# Patient Record
Sex: MALE | Race: WHITE | Employment: OTHER | URBAN - METROPOLITAN AREA
[De-identification: names, ages, dates, MRNs, and addresses within clinical notes are randomized per-mention and may not be internally consistent; named-entity substitution may affect disease eponyms.]

---

## 2021-09-24 ENCOUNTER — HOSPITAL ENCOUNTER (INPATIENT)
Age: 61
LOS: 7 days | Discharge: NURSING, REHAB OR LONG-TERM CARE | DRG: 871 | End: 2021-10-02
Attending: INTERNAL MEDICINE | Admitting: INTERNAL MEDICINE
Payer: MEDICARE

## 2021-09-24 ENCOUNTER — APPOINTMENT (OUTPATIENT)
Dept: GENERAL RADIOLOGY | Age: 61
DRG: 871 | End: 2021-09-24
Payer: MEDICARE

## 2021-09-24 DIAGNOSIS — J18.9 COMMUNITY ACQUIRED PNEUMONIA OF LEFT LUNG, UNSPECIFIED PART OF LUNG: ICD-10-CM

## 2021-09-24 DIAGNOSIS — J96.01 ACUTE RESPIRATORY FAILURE WITH HYPOXEMIA (HCC): Primary | ICD-10-CM

## 2021-09-24 DIAGNOSIS — N17.9 AKI (ACUTE KIDNEY INJURY) (HCC): ICD-10-CM

## 2021-09-24 LAB
A/G RATIO: 0.9 (ref 1.1–2.2)
ALBUMIN SERPL-MCNC: 3.8 G/DL (ref 3.4–5)
ALP BLD-CCNC: 67 U/L (ref 40–129)
ALT SERPL-CCNC: 19 U/L (ref 10–40)
ANION GAP SERPL CALCULATED.3IONS-SCNC: 19 MMOL/L (ref 3–16)
AST SERPL-CCNC: 30 U/L (ref 15–37)
BASOPHILS ABSOLUTE: 0 K/UL (ref 0–0.2)
BASOPHILS RELATIVE PERCENT: 0.3 %
BILIRUB SERPL-MCNC: 0.5 MG/DL (ref 0–1)
BUN BLDV-MCNC: 90 MG/DL (ref 7–20)
CALCIUM SERPL-MCNC: 9 MG/DL (ref 8.3–10.6)
CHLORIDE BLD-SCNC: 91 MMOL/L (ref 99–110)
CO2: 17 MMOL/L (ref 21–32)
CREAT SERPL-MCNC: 2.1 MG/DL (ref 0.8–1.3)
D DIMER: 1100 NG/ML DDU (ref 0–229)
EOSINOPHILS ABSOLUTE: 0 K/UL (ref 0–0.6)
EOSINOPHILS RELATIVE PERCENT: 0.1 %
GFR AFRICAN AMERICAN: 39
GFR NON-AFRICAN AMERICAN: 32
GLOBULIN: 4.1 G/DL
GLUCOSE BLD-MCNC: 137 MG/DL (ref 70–99)
HCT VFR BLD CALC: 47.4 % (ref 40.5–52.5)
HEMOGLOBIN: 16.4 G/DL (ref 13.5–17.5)
LACTIC ACID, SEPSIS: 1.2 MMOL/L (ref 0.4–1.9)
LYMPHOCYTES ABSOLUTE: 0.7 K/UL (ref 1–5.1)
LYMPHOCYTES RELATIVE PERCENT: 4.8 %
MCH RBC QN AUTO: 27.1 PG (ref 26–34)
MCHC RBC AUTO-ENTMCNC: 34.5 G/DL (ref 31–36)
MCV RBC AUTO: 78.3 FL (ref 80–100)
MONOCYTES ABSOLUTE: 0.6 K/UL (ref 0–1.3)
MONOCYTES RELATIVE PERCENT: 4.5 %
NEUTROPHILS ABSOLUTE: 12.3 K/UL (ref 1.7–7.7)
NEUTROPHILS RELATIVE PERCENT: 90.3 %
PDW BLD-RTO: 14.5 % (ref 12.4–15.4)
PLATELET # BLD: 194 K/UL (ref 135–450)
PMV BLD AUTO: 8.7 FL (ref 5–10.5)
POTASSIUM SERPL-SCNC: 3.4 MMOL/L (ref 3.5–5.1)
PRO-BNP: 283 PG/ML (ref 0–124)
PROCALCITONIN: 0.31 NG/ML (ref 0–0.15)
RBC # BLD: 6.05 M/UL (ref 4.2–5.9)
SODIUM BLD-SCNC: 127 MMOL/L (ref 136–145)
TOTAL PROTEIN: 7.9 G/DL (ref 6.4–8.2)
TROPONIN: <0.01 NG/ML
WBC # BLD: 13.6 K/UL (ref 4–11)

## 2021-09-24 PROCEDURE — 96374 THER/PROPH/DIAG INJ IV PUSH: CPT

## 2021-09-24 PROCEDURE — 83880 ASSAY OF NATRIURETIC PEPTIDE: CPT

## 2021-09-24 PROCEDURE — 84443 ASSAY THYROID STIM HORMONE: CPT

## 2021-09-24 PROCEDURE — 84300 ASSAY OF URINE SODIUM: CPT

## 2021-09-24 PROCEDURE — 84145 PROCALCITONIN (PCT): CPT

## 2021-09-24 PROCEDURE — 87086 URINE CULTURE/COLONY COUNT: CPT

## 2021-09-24 PROCEDURE — U0005 INFEC AGEN DETEC AMPLI PROBE: HCPCS

## 2021-09-24 PROCEDURE — 71045 X-RAY EXAM CHEST 1 VIEW: CPT

## 2021-09-24 PROCEDURE — 81001 URINALYSIS AUTO W/SCOPE: CPT

## 2021-09-24 PROCEDURE — 85379 FIBRIN DEGRADATION QUANT: CPT

## 2021-09-24 PROCEDURE — 36415 COLL VENOUS BLD VENIPUNCTURE: CPT

## 2021-09-24 PROCEDURE — 80053 COMPREHEN METABOLIC PANEL: CPT

## 2021-09-24 PROCEDURE — 87449 NOS EACH ORGANISM AG IA: CPT

## 2021-09-24 PROCEDURE — 6360000002 HC RX W HCPCS: Performed by: NURSE PRACTITIONER

## 2021-09-24 PROCEDURE — 83605 ASSAY OF LACTIC ACID: CPT

## 2021-09-24 PROCEDURE — 85025 COMPLETE CBC W/AUTO DIFF WBC: CPT

## 2021-09-24 PROCEDURE — U0003 INFECTIOUS AGENT DETECTION BY NUCLEIC ACID (DNA OR RNA); SEVERE ACUTE RESPIRATORY SYNDROME CORONAVIRUS 2 (SARS-COV-2) (CORONAVIRUS DISEASE [COVID-19]), AMPLIFIED PROBE TECHNIQUE, MAKING USE OF HIGH THROUGHPUT TECHNOLOGIES AS DESCRIBED BY CMS-2020-01-R: HCPCS

## 2021-09-24 PROCEDURE — 84484 ASSAY OF TROPONIN QUANT: CPT

## 2021-09-24 PROCEDURE — 99284 EMERGENCY DEPT VISIT MOD MDM: CPT

## 2021-09-24 PROCEDURE — 83935 ASSAY OF URINE OSMOLALITY: CPT

## 2021-09-24 PROCEDURE — 82550 ASSAY OF CK (CPK): CPT

## 2021-09-24 PROCEDURE — 93005 ELECTROCARDIOGRAM TRACING: CPT | Performed by: NURSE PRACTITIONER

## 2021-09-24 PROCEDURE — 87040 BLOOD CULTURE FOR BACTERIA: CPT

## 2021-09-24 RX ORDER — OXYCODONE AND ACETAMINOPHEN 10; 325 MG/1; MG/1
1 TABLET ORAL EVERY 8 HOURS PRN
COMMUNITY

## 2021-09-24 RX ORDER — HEPARIN SODIUM 10000 [USP'U]/100ML
5-30 INJECTION, SOLUTION INTRAVENOUS CONTINUOUS
Status: DISCONTINUED | OUTPATIENT
Start: 2021-09-24 | End: 2021-09-29

## 2021-09-24 RX ORDER — HEPARIN SODIUM 1000 [USP'U]/ML
40 INJECTION, SOLUTION INTRAVENOUS; SUBCUTANEOUS PRN
Status: DISCONTINUED | OUTPATIENT
Start: 2021-09-24 | End: 2021-09-29

## 2021-09-24 RX ORDER — POTASSIUM BICARBONATE 25 MEQ/1
50 TABLET, EFFERVESCENT ORAL ONCE
Status: COMPLETED | OUTPATIENT
Start: 2021-09-24 | End: 2021-09-25

## 2021-09-24 RX ORDER — HEPARIN SODIUM 1000 [USP'U]/ML
80 INJECTION, SOLUTION INTRAVENOUS; SUBCUTANEOUS ONCE
Status: COMPLETED | OUTPATIENT
Start: 2021-09-24 | End: 2021-09-25

## 2021-09-24 RX ORDER — DEXAMETHASONE SODIUM PHOSPHATE 10 MG/ML
6 INJECTION, SOLUTION INTRAMUSCULAR; INTRAVENOUS ONCE
Status: COMPLETED | OUTPATIENT
Start: 2021-09-24 | End: 2021-09-24

## 2021-09-24 RX ORDER — HEPARIN SODIUM 1000 [USP'U]/ML
80 INJECTION, SOLUTION INTRAVENOUS; SUBCUTANEOUS PRN
Status: DISCONTINUED | OUTPATIENT
Start: 2021-09-24 | End: 2021-09-29

## 2021-09-24 RX ADMIN — DEXAMETHASONE SODIUM PHOSPHATE 6 MG: 10 INJECTION, SOLUTION INTRAMUSCULAR; INTRAVENOUS at 23:46

## 2021-09-24 ASSESSMENT — ENCOUNTER SYMPTOMS
CHEST TIGHTNESS: 1
VOMITING: 0
DIARRHEA: 1
NAUSEA: 1
COUGH: 1
SHORTNESS OF BREATH: 1
ABDOMINAL PAIN: 0

## 2021-09-24 ASSESSMENT — PAIN DESCRIPTION - LOCATION: LOCATION: BACK

## 2021-09-24 ASSESSMENT — PAIN SCALES - GENERAL: PAINLEVEL_OUTOF10: 8

## 2021-09-25 PROBLEM — E87.1 HYPONATREMIA: Status: ACTIVE | Noted: 2021-09-25

## 2021-09-25 PROBLEM — F41.9 ANXIETY: Status: ACTIVE | Noted: 2021-09-25

## 2021-09-25 PROBLEM — U07.1 PNEUMONIA DUE TO COVID-19 VIRUS: Status: ACTIVE | Noted: 2021-09-25

## 2021-09-25 PROBLEM — J96.01 ACUTE RESPIRATORY FAILURE WITH HYPOXIA (HCC): Status: ACTIVE | Noted: 2021-09-25

## 2021-09-25 PROBLEM — J12.82 PNEUMONIA DUE TO COVID-19 VIRUS: Status: ACTIVE | Noted: 2021-09-25

## 2021-09-25 PROBLEM — N17.9 AKI (ACUTE KIDNEY INJURY) (HCC): Status: ACTIVE | Noted: 2021-09-25

## 2021-09-25 PROBLEM — E66.9 OBESITY (BMI 30-39.9): Status: ACTIVE | Noted: 2021-09-25

## 2021-09-25 LAB
A/G RATIO: 0.9 (ref 1.1–2.2)
ALBUMIN SERPL-MCNC: 3.6 G/DL (ref 3.4–5)
ALP BLD-CCNC: 73 U/L (ref 40–129)
ALT SERPL-CCNC: 24 U/L (ref 10–40)
ANION GAP SERPL CALCULATED.3IONS-SCNC: 17 MMOL/L (ref 3–16)
APTT: 140.5 SEC (ref 26.2–38.6)
APTT: 25 SEC (ref 26.2–38.6)
APTT: >248 SEC (ref 26.2–38.6)
AST SERPL-CCNC: 45 U/L (ref 15–37)
BASOPHILS ABSOLUTE: 0.1 K/UL (ref 0–0.2)
BASOPHILS RELATIVE PERCENT: 0.5 %
BILIRUB SERPL-MCNC: 0.4 MG/DL (ref 0–1)
BILIRUBIN URINE: NEGATIVE
BLOOD, URINE: ABNORMAL
BUN BLDV-MCNC: 91 MG/DL (ref 7–20)
C DIFF TOXIN/ANTIGEN: NORMAL
C-REACTIVE PROTEIN: 267.2 MG/L (ref 0–5.1)
CALCIUM SERPL-MCNC: 8.6 MG/DL (ref 8.3–10.6)
CHLORIDE BLD-SCNC: 92 MMOL/L (ref 99–110)
CLARITY: ABNORMAL
CO2: 17 MMOL/L (ref 21–32)
COLOR: YELLOW
COMMENT UA: ABNORMAL
CREAT SERPL-MCNC: 2.2 MG/DL (ref 0.8–1.3)
D DIMER: 525 NG/ML DDU (ref 0–229)
EKG ATRIAL RATE: 101 BPM
EKG DIAGNOSIS: NORMAL
EKG P AXIS: -24 DEGREES
EKG P-R INTERVAL: 120 MS
EKG Q-T INTERVAL: 330 MS
EKG QRS DURATION: 102 MS
EKG QTC CALCULATION (BAZETT): 427 MS
EKG R AXIS: 147 DEGREES
EKG T AXIS: -27 DEGREES
EKG VENTRICULAR RATE: 101 BPM
EOSINOPHILS ABSOLUTE: 0 K/UL (ref 0–0.6)
EOSINOPHILS RELATIVE PERCENT: 0 %
EPITHELIAL CELLS, UA: 3 /HPF (ref 0–5)
FIBRINOGEN: 911 MG/DL (ref 200–397)
FINE CASTS, UA: ABNORMAL /LPF (ref 0–2)
GFR AFRICAN AMERICAN: 37
GFR NON-AFRICAN AMERICAN: 30
GLOBULIN: 4 G/DL
GLUCOSE BLD-MCNC: 202 MG/DL (ref 70–99)
GLUCOSE BLD-MCNC: 232 MG/DL (ref 70–99)
GLUCOSE BLD-MCNC: 287 MG/DL (ref 70–99)
GLUCOSE URINE: NEGATIVE MG/DL
HCT VFR BLD CALC: 44.1 % (ref 40.5–52.5)
HEMOGLOBIN: 15.3 G/DL (ref 13.5–17.5)
HYALINE CASTS: ABNORMAL /LPF (ref 0–2)
INR BLD: 1.35 (ref 0.88–1.12)
KETONES, URINE: NEGATIVE MG/DL
LACTATE DEHYDROGENASE: 457 U/L (ref 100–190)
LACTIC ACID: 1.1 MMOL/L (ref 0.4–2)
LEUKOCYTE ESTERASE, URINE: NEGATIVE
LYMPHOCYTES ABSOLUTE: 0.4 K/UL (ref 1–5.1)
LYMPHOCYTES RELATIVE PERCENT: 3.3 %
MCH RBC QN AUTO: 27.1 PG (ref 26–34)
MCHC RBC AUTO-ENTMCNC: 34.8 G/DL (ref 31–36)
MCV RBC AUTO: 78 FL (ref 80–100)
MICROSCOPIC EXAMINATION: YES
MONOCYTES ABSOLUTE: 0.4 K/UL (ref 0–1.3)
MONOCYTES RELATIVE PERCENT: 2.9 %
NEUTROPHILS ABSOLUTE: 12.5 K/UL (ref 1.7–7.7)
NEUTROPHILS RELATIVE PERCENT: 93.3 %
NITRITE, URINE: NEGATIVE
OSMOLALITY URINE: 629 MOSM/KG (ref 390–1070)
OSMOLALITY: 307 MOSM/KG (ref 280–301)
PDW BLD-RTO: 14.5 % (ref 12.4–15.4)
PERFORMED ON: ABNORMAL
PERFORMED ON: ABNORMAL
PH UA: 5.5 (ref 5–8)
PLATELET # BLD: 172 K/UL (ref 135–450)
PMV BLD AUTO: 8 FL (ref 5–10.5)
POTASSIUM REFLEX MAGNESIUM: 3.9 MMOL/L (ref 3.5–5.1)
PROCALCITONIN: 0.34 NG/ML (ref 0–0.15)
PROTEIN UA: 100 MG/DL
PROTHROMBIN TIME: 15.5 SEC (ref 9.9–12.7)
RBC # BLD: 5.66 M/UL (ref 4.2–5.9)
RBC UA: 5 /HPF (ref 0–4)
REASON FOR REJECTION: NORMAL
REJECTED TEST: NORMAL
SARS-COV-2: DETECTED
SODIUM BLD-SCNC: 126 MMOL/L (ref 136–145)
SODIUM BLD-SCNC: 126 MMOL/L (ref 136–145)
SODIUM BLD-SCNC: 128 MMOL/L (ref 136–145)
SODIUM URINE: <20 MMOL/L
SPECIFIC GRAVITY UA: 1.02 (ref 1–1.03)
TOTAL CK: 267 U/L (ref 39–308)
TOTAL PROTEIN: 7.6 G/DL (ref 6.4–8.2)
TROPONIN: <0.01 NG/ML
TSH REFLEX: 0.81 UIU/ML (ref 0.27–4.2)
URINE REFLEX TO CULTURE: YES
URINE TYPE: ABNORMAL
UROBILINOGEN, URINE: 0.2 E.U./DL
VITAMIN D 25-HYDROXY: 82.2 NG/ML
WBC # BLD: 13.4 K/UL (ref 4–11)
WBC UA: 11 /HPF (ref 0–5)

## 2021-09-25 PROCEDURE — 84145 PROCALCITONIN (PCT): CPT

## 2021-09-25 PROCEDURE — 84295 ASSAY OF SERUM SODIUM: CPT

## 2021-09-25 PROCEDURE — 83615 LACTATE (LD) (LDH) ENZYME: CPT

## 2021-09-25 PROCEDURE — 2060000000 HC ICU INTERMEDIATE R&B

## 2021-09-25 PROCEDURE — 87449 NOS EACH ORGANISM AG IA: CPT

## 2021-09-25 PROCEDURE — 83930 ASSAY OF BLOOD OSMOLALITY: CPT

## 2021-09-25 PROCEDURE — 84484 ASSAY OF TROPONIN QUANT: CPT

## 2021-09-25 PROCEDURE — 6370000000 HC RX 637 (ALT 250 FOR IP): Performed by: INTERNAL MEDICINE

## 2021-09-25 PROCEDURE — 2580000003 HC RX 258: Performed by: INTERNAL MEDICINE

## 2021-09-25 PROCEDURE — 85379 FIBRIN DEGRADATION QUANT: CPT

## 2021-09-25 PROCEDURE — 6360000002 HC RX W HCPCS: Performed by: INTERNAL MEDICINE

## 2021-09-25 PROCEDURE — 6370000000 HC RX 637 (ALT 250 FOR IP): Performed by: HOSPITALIST

## 2021-09-25 PROCEDURE — XW033E5 INTRODUCTION OF REMDESIVIR ANTI-INFECTIVE INTO PERIPHERAL VEIN, PERCUTANEOUS APPROACH, NEW TECHNOLOGY GROUP 5: ICD-10-PCS | Performed by: INTERNAL MEDICINE

## 2021-09-25 PROCEDURE — 36415 COLL VENOUS BLD VENIPUNCTURE: CPT

## 2021-09-25 PROCEDURE — 83605 ASSAY OF LACTIC ACID: CPT

## 2021-09-25 PROCEDURE — 2500000003 HC RX 250 WO HCPCS: Performed by: INTERNAL MEDICINE

## 2021-09-25 PROCEDURE — 94761 N-INVAS EAR/PLS OXIMETRY MLT: CPT

## 2021-09-25 PROCEDURE — 82728 ASSAY OF FERRITIN: CPT

## 2021-09-25 PROCEDURE — 85730 THROMBOPLASTIN TIME PARTIAL: CPT

## 2021-09-25 PROCEDURE — 85025 COMPLETE CBC W/AUTO DIFF WBC: CPT

## 2021-09-25 PROCEDURE — XW033H5 INTRODUCTION OF TOCILIZUMAB INTO PERIPHERAL VEIN, PERCUTANEOUS APPROACH, NEW TECHNOLOGY GROUP 5: ICD-10-PCS | Performed by: INTERNAL MEDICINE

## 2021-09-25 PROCEDURE — 2700000000 HC OXYGEN THERAPY PER DAY

## 2021-09-25 PROCEDURE — 85610 PROTHROMBIN TIME: CPT

## 2021-09-25 PROCEDURE — 80053 COMPREHEN METABOLIC PANEL: CPT

## 2021-09-25 PROCEDURE — 93010 ELECTROCARDIOGRAM REPORT: CPT | Performed by: INTERNAL MEDICINE

## 2021-09-25 PROCEDURE — 86140 C-REACTIVE PROTEIN: CPT

## 2021-09-25 PROCEDURE — 82306 VITAMIN D 25 HYDROXY: CPT

## 2021-09-25 PROCEDURE — 85384 FIBRINOGEN ACTIVITY: CPT

## 2021-09-25 PROCEDURE — 87324 CLOSTRIDIUM AG IA: CPT

## 2021-09-25 RX ORDER — NICOTINE POLACRILEX 4 MG
15 LOZENGE BUCCAL PRN
Status: DISCONTINUED | OUTPATIENT
Start: 2021-09-25 | End: 2021-10-02 | Stop reason: HOSPADM

## 2021-09-25 RX ORDER — FAMOTIDINE 20 MG/1
20 TABLET, FILM COATED ORAL 2 TIMES DAILY
Status: DISCONTINUED | OUTPATIENT
Start: 2021-09-25 | End: 2021-10-02 | Stop reason: HOSPADM

## 2021-09-25 RX ORDER — SODIUM CHLORIDE 0.9 % (FLUSH) 0.9 %
5-40 SYRINGE (ML) INJECTION EVERY 12 HOURS SCHEDULED
Status: DISCONTINUED | OUTPATIENT
Start: 2021-09-25 | End: 2021-10-02 | Stop reason: HOSPADM

## 2021-09-25 RX ORDER — FAMOTIDINE 20 MG/1
10 TABLET, FILM COATED ORAL 2 TIMES DAILY
Status: DISCONTINUED | OUTPATIENT
Start: 2021-09-25 | End: 2021-09-25 | Stop reason: SDUPTHER

## 2021-09-25 RX ORDER — VITAMIN B COMPLEX
2000 TABLET ORAL DAILY
Status: DISCONTINUED | OUTPATIENT
Start: 2021-09-25 | End: 2021-10-02 | Stop reason: HOSPADM

## 2021-09-25 RX ORDER — ACETAMINOPHEN 650 MG/1
650 SUPPOSITORY RECTAL EVERY 6 HOURS PRN
Status: DISCONTINUED | OUTPATIENT
Start: 2021-09-25 | End: 2021-10-02 | Stop reason: HOSPADM

## 2021-09-25 RX ORDER — DEXTROSE MONOHYDRATE 50 MG/ML
100 INJECTION, SOLUTION INTRAVENOUS PRN
Status: DISCONTINUED | OUTPATIENT
Start: 2021-09-25 | End: 2021-10-02 | Stop reason: HOSPADM

## 2021-09-25 RX ORDER — INSULIN LISPRO 100 [IU]/ML
0-3 INJECTION, SOLUTION INTRAVENOUS; SUBCUTANEOUS NIGHTLY
Status: DISCONTINUED | OUTPATIENT
Start: 2021-09-25 | End: 2021-09-29

## 2021-09-25 RX ORDER — ACETAMINOPHEN 325 MG/1
650 TABLET ORAL EVERY 6 HOURS PRN
Status: DISCONTINUED | OUTPATIENT
Start: 2021-09-25 | End: 2021-10-02 | Stop reason: HOSPADM

## 2021-09-25 RX ORDER — INSULIN LISPRO 100 [IU]/ML
0-6 INJECTION, SOLUTION INTRAVENOUS; SUBCUTANEOUS
Status: DISCONTINUED | OUTPATIENT
Start: 2021-09-25 | End: 2021-09-29

## 2021-09-25 RX ORDER — SODIUM CHLORIDE 0.9 % (FLUSH) 0.9 %
5-40 SYRINGE (ML) INJECTION PRN
Status: DISCONTINUED | OUTPATIENT
Start: 2021-09-25 | End: 2021-10-02 | Stop reason: HOSPADM

## 2021-09-25 RX ORDER — GUAIFENESIN/DEXTROMETHORPHAN 100-10MG/5
5 SYRUP ORAL EVERY 4 HOURS PRN
Status: DISCONTINUED | OUTPATIENT
Start: 2021-09-25 | End: 2021-10-02 | Stop reason: HOSPADM

## 2021-09-25 RX ORDER — DEXTROSE MONOHYDRATE 25 G/50ML
12.5 INJECTION, SOLUTION INTRAVENOUS PRN
Status: DISCONTINUED | OUTPATIENT
Start: 2021-09-25 | End: 2021-10-02 | Stop reason: HOSPADM

## 2021-09-25 RX ORDER — SODIUM CHLORIDE 9 MG/ML
25 INJECTION, SOLUTION INTRAVENOUS PRN
Status: DISCONTINUED | OUTPATIENT
Start: 2021-09-25 | End: 2021-10-02 | Stop reason: HOSPADM

## 2021-09-25 RX ORDER — OXYCODONE AND ACETAMINOPHEN 10; 325 MG/1; MG/1
1 TABLET ORAL EVERY 8 HOURS PRN
Status: DISCONTINUED | OUTPATIENT
Start: 2021-09-25 | End: 2021-09-27

## 2021-09-25 RX ORDER — ONDANSETRON 4 MG/1
4 TABLET, ORALLY DISINTEGRATING ORAL EVERY 8 HOURS PRN
Status: DISCONTINUED | OUTPATIENT
Start: 2021-09-25 | End: 2021-10-02 | Stop reason: HOSPADM

## 2021-09-25 RX ORDER — POLYETHYLENE GLYCOL 3350 17 G/17G
17 POWDER, FOR SOLUTION ORAL DAILY PRN
Status: DISCONTINUED | OUTPATIENT
Start: 2021-09-25 | End: 2021-10-02 | Stop reason: HOSPADM

## 2021-09-25 RX ORDER — ONDANSETRON 2 MG/ML
4 INJECTION INTRAMUSCULAR; INTRAVENOUS EVERY 6 HOURS PRN
Status: DISCONTINUED | OUTPATIENT
Start: 2021-09-25 | End: 2021-10-02 | Stop reason: HOSPADM

## 2021-09-25 RX ORDER — MAGNESIUM HYDROXIDE/ALUMINUM HYDROXICE/SIMETHICONE 120; 1200; 1200 MG/30ML; MG/30ML; MG/30ML
30 SUSPENSION ORAL EVERY 6 HOURS PRN
Status: DISCONTINUED | OUTPATIENT
Start: 2021-09-25 | End: 2021-10-02 | Stop reason: HOSPADM

## 2021-09-25 RX ORDER — DEXAMETHASONE SODIUM PHOSPHATE 10 MG/ML
6 INJECTION, SOLUTION INTRAMUSCULAR; INTRAVENOUS DAILY
Status: DISCONTINUED | OUTPATIENT
Start: 2021-09-25 | End: 2021-09-25

## 2021-09-25 RX ADMIN — SODIUM CHLORIDE 25 ML: 9 INJECTION, SOLUTION INTRAVENOUS at 23:00

## 2021-09-25 RX ADMIN — Medication 2000 UNITS: at 10:10

## 2021-09-25 RX ADMIN — DEXAMETHASONE SODIUM PHOSPHATE 20 MG: 4 INJECTION, SOLUTION INTRA-ARTICULAR; INTRALESIONAL; INTRAMUSCULAR; INTRAVENOUS; SOFT TISSUE at 10:10

## 2021-09-25 RX ADMIN — FAMOTIDINE 20 MG: 20 TABLET, FILM COATED ORAL at 22:00

## 2021-09-25 RX ADMIN — HEPARIN SODIUM 18 UNITS/KG/HR: 10000 INJECTION, SOLUTION INTRAVENOUS at 03:00

## 2021-09-25 RX ADMIN — INSULIN LISPRO 2 UNITS: 100 INJECTION, SOLUTION INTRAVENOUS; SUBCUTANEOUS at 21:35

## 2021-09-25 RX ADMIN — Medication 10 ML: at 23:01

## 2021-09-25 RX ADMIN — POTASSIUM BICARBONATE 50 MEQ: 978 TABLET, EFFERVESCENT ORAL at 01:34

## 2021-09-25 RX ADMIN — FAMOTIDINE 20 MG: 20 TABLET, FILM COATED ORAL at 10:10

## 2021-09-25 RX ADMIN — Medication 1000 MG: at 02:52

## 2021-09-25 RX ADMIN — AZITHROMYCIN MONOHYDRATE 500 MG: 500 INJECTION, POWDER, LYOPHILIZED, FOR SOLUTION INTRAVENOUS at 02:56

## 2021-09-25 RX ADMIN — REMDESIVIR 200 MG: 100 INJECTION, POWDER, LYOPHILIZED, FOR SOLUTION INTRAVENOUS at 23:02

## 2021-09-25 RX ADMIN — SODIUM BICARBONATE: 84 INJECTION, SOLUTION INTRAVENOUS at 16:37

## 2021-09-25 RX ADMIN — SODIUM BICARBONATE: 84 INJECTION, SOLUTION INTRAVENOUS at 01:33

## 2021-09-25 RX ADMIN — OXYCODONE AND ACETAMINOPHEN 1 TABLET: 10; 325 TABLET ORAL at 15:00

## 2021-09-25 RX ADMIN — FAMOTIDINE 20 MG: 20 TABLET, FILM COATED ORAL at 03:01

## 2021-09-25 RX ADMIN — HEPARIN SODIUM 7260 UNITS: 1000 INJECTION INTRAVENOUS; SUBCUTANEOUS at 03:00

## 2021-09-25 ASSESSMENT — PAIN SCALES - GENERAL
PAINLEVEL_OUTOF10: 8
PAINLEVEL_OUTOF10: 0
PAINLEVEL_OUTOF10: 9
PAINLEVEL_OUTOF10: 8
PAINLEVEL_OUTOF10: 0

## 2021-09-25 ASSESSMENT — PAIN DESCRIPTION - LOCATION
LOCATION: BACK
LOCATION: BACK

## 2021-09-25 ASSESSMENT — PAIN DESCRIPTION - PAIN TYPE: TYPE: ACUTE PAIN;CHRONIC PAIN

## 2021-09-25 NOTE — ED PROVIDER NOTES
[facility]        Pt Name: Bong Stinson  MRN: [id number]  [place] 1960  Date of evaluation: 9/24/2021  Provider: YU Davis - DANIELLE  PCP: Darius Burger MD  Note Started: 11:50 PM EDT       MALORIE. I have evaluated this patient. My supervising physician was available for consultation. CHIEF COMPLAINT       Chief Complaint   Patient presents with    Fatigue     Pt. arrived via EMS with c/o fatigue x 3 weeks and SOB. Pt. arrived on 4L via cannula to maintain sats according to EMS       HISTORY OF PRESENT ILLNESS   (Location, Timing/Onset, Context/Setting, Quality, Duration, Modifying Factors, Severity, Associated Signs and Symptoms)  Note limiting factors. Chief Complaint: cough, weakness, diarrhea x 3 weeks     Bong Stinson is a 57 y.o. male who presents to the emergency department with illness x3 weeks. The patient reports that he is not getting any better. He is not vaccinated from COVID-19 and is had multiple recent close exposures. States that he has been coughing and has had diarrhea for about 3 weeks and has had increasing weakness. Having difficulty caring for himself at home. The patient is hypoxic upon arrival, does not generally wear home oxygen. He is requiring 4 L of oxygen to maintain a saturation greater than 90%. Denies any visual disturbances. No chest pain or pressure. No neck or back pain. No vomiting, constipation, or dysuria. No rash. Nursing Notes were all reviewed and agreed with or any disagreements were addressed in the HPI. REVIEW OF SYSTEMS    (2-9 systems for level 4, 10 or more for level 5)     Review of Systems   Constitutional: Positive for activity change, appetite change, chills, fatigue and fever. Respiratory: Positive for cough, chest tightness and shortness of breath. Cardiovascular: Negative for chest pain.    Gastrointestinal: Positive for diarrhea and nausea. Negative for abdominal pain and vomiting. Genitourinary: Negative for dysuria. Neurological: Positive for weakness and headaches. All other systems reviewed and are negative. Positives and Pertinent negatives as per HPI. Except as noted above in the ROS, all other systems were reviewed and negative. PAST MEDICAL HISTORY     Past Medical History:   Diagnosis Date    Anxiety     GERD (gastroesophageal reflux disease)     Heartburn     Hypertension          SURGICAL HISTORY     Past Surgical History:   Procedure Laterality Date    HERNIA REPAIR      TESTICLE SURGERY      spermatocele         CURRENTMEDICATIONS       Previous Medications    ALPRAZOLAM (XANAX) 0.5 MG TABLET    Take 1 tablet by mouth 2 times daily as needed for Sleep    BACLOFEN (LIORESAL) 10 MG TABLET    Take 10 mg by mouth 2 times daily as needed    DULOXETINE (CYMBALTA) 30 MG CAPSULE    Take 1 capsule by mouth daily    FLUTICASONE (FLONASE) 50 MCG/ACT NASAL SPRAY    2 sprays by Nasal route daily    HYDROCODONE-ACETAMINOPHEN (NORCO) 5-325 MG PER TABLET    Take 1 tablet by mouth 2 times daily    LISINOPRIL (PRINIVIL;ZESTRIL) 20 MG TABLET    TAKE 1 TABLET BY MOUTH DAILY    MELATONIN 5 MG TABS TABLET    Take 1 tablet by mouth nightly as needed. OMEPRAZOLE (PRILOSEC) 20 MG CAPSULE    Take 20 mg by mouth daily    OXYCODONE-ACETAMINOPHEN (PERCOCET)  MG PER TABLET    Take 1 tablet by mouth every 8 hours as needed for Pain.          ALLERGIES     Codeine    FAMILYHISTORY       Family History   Problem Relation Age of Onset    Heart Disease Father     High Cholesterol Father     High Blood Pressure Father     High Blood Pressure Maternal Grandfather     Stroke Maternal Grandfather     Cancer Paternal Grandfather         lung    Other Mother         colectomy due to diverticulitis          SOCIAL HISTORY       Social History     Tobacco Use    Smoking status: Former Smoker     Packs/day: 1.00     Types: Cigarettes Quit date: 10/6/2012     Years since quittin.9    Smokeless tobacco: Never Used   Substance Use Topics    Alcohol use: No     Alcohol/week: 0.0 standard drinks     Comment: quit     Drug use: No       SCREENINGS             PHYSICAL EXAM    (up to 7 for level 4, 8 or more for level 5)     ED Triage Vitals   BP Temp Temp Source Pulse Resp SpO2 Height Weight   21   (!) 129/104 98.8 °F (37.1 °C) Oral 92 (!) 33 91 % 5' 7.5\" (1.715 m) 200 lb (90.7 kg)       Physical Exam  Vitals and nursing note reviewed. Constitutional:       Appearance: He is well-developed. He is ill-appearing. He is not diaphoretic. HENT:      Head: Normocephalic and atraumatic. Right Ear: External ear normal.      Left Ear: External ear normal.   Eyes:      General:         Right eye: No discharge. Left eye: No discharge. Neck:      Vascular: No JVD. Cardiovascular:      Rate and Rhythm: Normal rate and regular rhythm. Pulses: Normal pulses. Heart sounds: Normal heart sounds. Pulmonary:      Effort: No respiratory distress. Breath sounds: Rhonchi present. Abdominal:      Palpations: Abdomen is soft. Musculoskeletal:         General: Normal range of motion. Cervical back: Normal range of motion and neck supple. Skin:     General: Skin is warm and dry. Coloration: Skin is not pale. Neurological:      Mental Status: He is alert and oriented to person, place, and time.    Psychiatric:         Behavior: Behavior normal.         DIAGNOSTIC RESULTS   LABS:    Labs Reviewed   CBC WITH AUTO DIFFERENTIAL - Abnormal; Notable for the following components:       Result Value    WBC 13.6 (*)     RBC 6.05 (*)     MCV 78.3 (*)     Neutrophils Absolute 12.3 (*)     Lymphocytes Absolute 0.7 (*)     All other components within normal limits    Narrative:     Performed at:  [facility]) - Narrative:     [place]  ER[facility] tel. [phone],  Chemistry results called to and read back by SANJEEV Foster, 09/24/2021  23:26, by Nasreen Angeles  Performed at:  [facility]  [address] [facility], [address]   Phone [phone]   [place], [place]    Narrative:     Performed at:  [facility]  [address] [facility],  [place], [address]   Phone [phone]   URINE RT REFLEX TO CULTURE   COVID-19   CK   OSMOLALITY, URINE   OSMOLALITY   SODIUM, URINE, RANDOM   TSH WITH REFLEX   APTT   APTT   APTT   BLOOD OCCULT STOOL DIAGNOSTIC       When ordered only abnormal lab results are displayed. All other labs were within normal range or not returned as of this dictation. EKG: When ordered, EKG's are interpreted by the Emergency Department Physician in the absence of a cardiologist.  Please see their note for interpretation of EKG. RADIOLOGY:   Non-plain film images such as CT, Ultrasound and MRI are read by the radiologist. Plain radiographic images are visualized and preliminarily interpreted by the ED Provider with the below findings:        Interpretation per the Radiologist below, if available at the time of this note:    XR CHEST PORTABLE   Final Result   Left-sided airspace disease. This is worrisome for developing pneumonia   versus interstitial alveolar edema. At minimum, consider follow-up imaging to document resolution following   medical treatment course. XR CHEST PORTABLE    Result Date: 9/24/2021  EXAMINATION: ONE XRAY VIEW OF THE CHEST 9/24/2021 10:44 pm COMPARISON: 03/19/2010 HISTORY: ORDERING SYSTEM PROVIDED HISTORY: SOB TECHNOLOGIST PROVIDED HISTORY: Reason for exam:->SOB FINDINGS: Cardiomegaly. Mild right hilar prominence could be vascular. Right lung is grossly clear. There multifocal parenchymal opacity involving left lung worrisome for developing pneumonia versus interstitial alveolar infiltrate/edema. No effusion.   No pneumothorax. Left-sided airspace disease. This is worrisome for developing pneumonia versus interstitial alveolar edema. At minimum, consider follow-up imaging to document resolution following medical treatment course. PROCEDURES   Unless otherwise noted below, none     Procedures    CRITICAL CARE TIME   The total critical care time spent while evaluating and treating this patient was at least 41 minutes. This excludes time spent doing separately billable procedures. This includes time at the bedside, data interpretation, medication management, obtaining critical history from collateral sources if the patient is unable to provide it directly, and physician consultation. Specifics of interventions taken and potentially life-threatening diagnostic considerations are listed above in the medical decision making.       CONSULTS:  IP CONSULT TO NEPHROLOGY      EMERGENCY DEPARTMENT COURSE and DIFFERENTIAL DIAGNOSIS/MDM:   Vitals:    Vitals:    09/24/21 2132 09/24/21 2245 09/24/21 2315 09/24/21 2330   BP:   101/79    Pulse:  107 90 97   Resp:  25 20 (!) 31   Temp:       TempSrc:       SpO2:  95%     Weight: 200 lb (90.7 kg)      Height: 5' 7.5\" (1.715 m)          Patient was given the following medications:  Medications   sodium bicarbonate 100 mEq in dextrose 5 % 1,000 mL infusion (has no administration in time range)   heparin (porcine) injection 7,260 Units (has no administration in time range)   heparin (porcine) injection 7,260 Units (has no administration in time range)   heparin (porcine) injection 3,630 Units (has no administration in time range)   heparin 25,000 units in dextrose 5% 250 mL (premix) infusion (has no administration in time range)   potassium bicarbonate (K-LYTE) disintegrating tablet 50 mEq (has no administration in time range)   azithromycin (ZITHROMAX) 500 mg in D5W 250ml Vial Mate (has no administration in time range)   cefTRIAXone (ROCEPHIN) 1000 mg in sterile water 10 mL IV syringe (has no administration in time range)   dexamethasone (PF) (DECADRON) injection 6 mg (6 mg IntraVENous Given 9/24/21 5815)           Briefly, this is a 57year old male that presents to the emergency department with 3 weeks of illness. The patient reports that he has had increasing weakness with cough, diarrhea, and fatigue. Believes that he may have COVID-19 but has not had an outpatient test.  He is not vaccinated. He has had multiple close exposures with positive Covid patients. CBC does show leukocytosis with left shift. CMP reveals ASHWINI with dehydration, BUN is 90 creatinine 2.1 and GFR of 32. Sodium 127 with potassium of 3.4. The patient is acidotic with a bicarb of 17 and anion gap of 19. Glucose is 137. Troponin negative. . Lactate 1.2 but procalcitonin is 0.31. This patient antibiotics for treatment of airspace disease. Dimer elevated at 1100 but given the patient's ASHWINI we are unable to use contrast to do a CT to rule out PE and VQ scan is unavailable at this time at night. Plan to infuse heparin overnight and reevaluate once kidney function normalizes. XR CHEST PORTABLE (Final result)  Result time 09/24/21 23:14:59  Final result by Ursula Britt MD (09/24/21 23:14:59)                Impression:    Left-sided airspace disease.  This is worrisome for developing pneumonia   versus interstitial alveolar edema. At minimum, consider follow-up imaging to document resolution following   medical treatment course. Plan to give bicarb drip, heparin, antibiotics including Rocephin and azithromycin. Decadron 6 mg was given for concern of possible Covid but hypoxemia. Patient will be admitted for acute respiratory failure with hypoxemia. FINAL IMPRESSION      1. Acute respiratory failure with hypoxemia (HCC)    2. Community acquired pneumonia of left lung, unspecified part of lung    3.  ASHWINI (acute kidney injury) ([facility] [address]          DISPOSITION/PLAN   DISPOSITION Decision To Admit 09/24/2021 11:54:45 PM      PATIENT REFERRED TO:  No follow-up provider specified.     DISCHARGE MEDICATIONS:  New Prescriptions    No medications on file       DISCONTINUED MEDICATIONS:  Discontinued Medications    No medications on file              (Please note that portions of this note were completed with a voice recognition program.  Efforts were made to edit the dictations but occasionally words are mis-transcribed.)    YU Valladares CNP (electronically signed)            YU Valladares CNP  09/24/21 5360

## 2021-09-25 NOTE — H&P
Hospital Medicine History and Physical    9/25/2021    Date of Admission: 9/25/2021    Date of Service: Pt seen/examined on 9/25/2021 and admitted to inpatient. Assessment/plan:  1. Pneumonia. Possible bacteria, cannot exclude COVID-19 pneumonia. Checking Strep antigen studies, Legionella antigen studies, COVID-19 panel. In the meantime, will start on IV Decadron for possible COVID-19 infection. Currently not a candidate for remdesivir due to ASHWINI (+ does not have confirmed COVID-19 infection yet). Give 1 dose each of Zithromax and Rocephin; hold further antibiotics unless culture dictates otherwise (outpatient negative for COVID-19). Will obtain COVID-19 PCR if rapid test is negative. 2. Acute respiratory failure with hypoxia. Likely secondary to underlying pneumonic process. Continue supplemental oxygen with plan to wean as tolerated. Treat underlying pneumonia. Monitor pulse oximeter closely. 3. Severe dehydration. Likely secondary to underlying infectious process, gastroenteritis. Continue gentle intravenous fluid and monitor volume status closely. 4. Acute gastroenteritis, likely viral. Monitor stool count closely. Continue gentle intravenous fluid. 5. Hyponatremia. Likely hypovolemic hyponatremia in the setting of GI loss, viral infection. Checking TSH, urine and serum osmole, urine sodium. Continue gentle intravenous fluid. Monitor serial sodium level closely. 6. Acute kidney injury. Likely prerenal azotemia from severe dehydration. Checking urinalysis, CK, renal ultrasound. Avoid nephrotoxic medications. Continue intravenous fluid. Nephrology has been consulted to assist with management. 7. Anion gap metabolic acidosis. Likely secondary to underlying kidney injury. Continue intravenous fluid and monitor electrolytes closely. 8. Hypokalemia, potassium of 3.4. 50 mEq of sodium bicarb has been ordered. Recheck potassium and magnesium levels in the morning. 9. Significantly elevated D-dimer. Patient has ASHWINI; has, and not a candidate for CT-PE protocol. He also happens to have abnormal chest x-ray; hence, VQ scan would likely be abnormal. In the meantime, will start on full dose heparin infusion (especially with consideration for suspected COVID-19 infection), hypoxia. Consider checking CT-PE protocol once renal function returns to normal and at that time, heparin infusion could be discontinued he no evidence of pulmonary embolism. 10. Other comorbidities: History of anxiety disorder, GERD, peptic ulcer disease, essential hypertension, obesity with BMI of 30.8 kg/m². Activities: Up with assist  Prophylaxis: On heparin infusion, Pepcid  Code status: Full code    ==========================================================  Chief complaint:  Chief Complaint   Patient presents with    Fatigue     Pt. arrived via EMS with c/o fatigue x 3 weeks and SOB. Pt. arrived on 4L via cannula to maintain sats according to EMS       History of Presenting Illness: This is a pleasant 57 y.o. male who is unvaccinated for COVID-19, with history of anxiety disorder, gastroesophageal reflux disease, peptic ulcer disease, essential hypertension, obesity with BMI of 30.86 kg/m², who presents to the emergency room with complaints of generalized weakness, worsening shortness of breath, body aches, cough, ongoing for the past 3 weeks. He has had diarrhea. He has had exposure to multiple individuals with COVID-19 infection. Patient was noted to be hypoxic, requiring 4 L/min supplemental oxygen to maintain saturations above 92% by EMS. He remained hypoxic on presentation to the emergency room. Chest x-ray reveals left airspace disease concerning for pneumonia. Multiple lab abnormalities, including significantly elevated D-dimer 1100, sodium of 127, anion gap of 19, bicarb of 17 potassium of 3.4, BUN of 90, creatinine of 2.1, elevated hemoglobin of 16.4 (patient has baseline hemoglobin around 14, per care everywhere). Past Medical History:      Diagnosis Date    Anxiety     GERD (gastroesophageal reflux disease)     Heartburn     Hypertension        Past Surgical History:      Procedure Laterality Date    HERNIA REPAIR      TESTICLE SURGERY      spermatocele       Medications (prior to admission):  Prior to Admission medications    Medication Sig Start Date End Date Taking? Authorizing Provider   oxyCODONE-acetaminophen (PERCOCET)  MG per tablet Take 1 tablet by mouth every 8 hours as needed for Pain. Yes Historical Provider, MD   lisinopril (PRINIVIL;ZESTRIL) 20 MG tablet TAKE 1 TABLET BY MOUTH DAILY 6/21/16  Yes Kiersten Arevalo MD   omeprazole (PRILOSEC) 20 MG capsule Take 20 mg by mouth daily    Historical Provider, MD   HYDROcodone-acetaminophen (NORCO) 5-325 MG per tablet Take 1 tablet by mouth 2 times daily    Historical Provider, MD   fluticasone (FLONASE) 50 MCG/ACT nasal spray 2 sprays by Nasal route daily    Historical Provider, MD   baclofen (LIORESAL) 10 MG tablet Take 10 mg by mouth 2 times daily as needed    Historical Provider, MD   DULoxetine (CYMBALTA) 30 MG capsule Take 1 capsule by mouth daily 9/30/15   Howard Tovar MD   ALPRAZolam [place]) 0.5 MG tablet Take 1 tablet by mouth 2 times daily as needed for Sleep 5/28/15   Kiersten Arevalo MD   Melatonin 5 MG TABS tablet Take 1 tablet by mouth nightly as needed. Historical Provider, MD       Allergy(ies):  Codeine    Social History:  TOBACCO:  reports that he quit smoking about 8 years ago. His smoking use included cigarettes. He smoked 1.00 pack per day. He has never used smokeless tobacco.  ETOH:  reports no history of alcohol use.     Family History:      Problem Relation Age of Onset    Heart Disease Father     High Cholesterol Father     High Blood Pressure Father     High Blood Pressure Maternal Grandfather     Stroke Maternal Grandfather     Cancer Paternal Grandfather         lung    Other Mother         colectomy due to diverticulitis       Review of Systems:  Pertinent positives are listed in HPI. At least 10-point ROS reviewed and were negative. Vitals and physical examination:  /79   Pulse 97   Temp 98.8 °F (37.1 °C) (Oral)   Resp (!) 31   Ht 5' 7.5\" (1.715 m)   Wt 200 lb (90.7 kg)   SpO2 95%   BMI 30.86 kg/m²   Gen/overall appearance: Not in acute distress. Alert. Oriented x3. Head: Normocephalic, atraumatic  Eyes: EOMI, good acuity  ENT: Oral mucosa dry  Neck: No JVD, thyromegaly  CVS: Nml S1S2, no MRG, RRR  Pulm: Fine crackles in lung bases. Tachypneic. Gastrointestinal: Soft, NT/ND, +BS  Musculoskeletal: No edema. Warm  Neuro: No focal deficit. Moves extremity spontaneously. Psychiatry: Appropriate affect. Not agitated. Skin: Warm, dry with normal turgor. No rash  Capillary refill: Brisk,< 3 seconds   Peripheral Pulses: +2 palpable, equal bilaterally       Labs/imaging/EKG:  CBC:   Recent Labs     09/24/21  2234   WBC 13.6*   HGB 16.4        BMP:    Recent Labs     09/24/21 2234   *   K 3.4*   CL 91*   CO2 17*   BUN 90*   CREATININE 2.1*   GLUCOSE 137*     Hepatic:   Recent Labs     09/24/21  2234   AST 30   ALT 19   BILITOT 0.5   ALKPHOS 67       XR CHEST PORTABLE    Result Date: 9/24/2021  EXAMINATION: ONE XRAY VIEW OF THE CHEST 9/24/2021 10:44 pm COMPARISON: 03/19/2010 HISTORY: ORDERING SYSTEM PROVIDED HISTORY: SOB TECHNOLOGIST PROVIDED HISTORY: Reason for exam:->SOB FINDINGS: Cardiomegaly. Mild right hilar prominence could be vascular. Right lung is grossly clear. There multifocal parenchymal opacity involving left lung worrisome for developing pneumonia versus interstitial alveolar infiltrate/edema. No effusion. No pneumothorax. Left-sided airspace disease. This is worrisome for developing pneumonia versus interstitial alveolar edema. At minimum, consider follow-up imaging to document resolution following medical treatment course.        EKG: Sinus tachycardia, rate 101 beats

## 2021-09-25 NOTE — ED NOTES
Bed: 09  Expected date:   Expected time:   Means of arrival:   Comments:  chris Desai RN  09/24/21 1278

## 2021-09-25 NOTE — PROGRESS NOTES
Patient is admitted to room 3361 from the emergency room. Patient has arrived to the floor at this time via stretcher and ambulated/transfered to bed. Oriented to room. Call light and bedside table within reach. Patient rates a low fall risk. Denies further needs at this time. Will continue to monitor.  Lily Evans

## 2021-09-25 NOTE — CONSULTS
MD Ashlee Ford MD Booker Hug, MD               Office: [phone]                      Fax: [phone]             [facility]                   NEPHROLOGY INITIAL CONSULT NOTE:     PATIENT NAME: Lizette Moon  : 1960  MRN: [id number]  REASON FOR CONSULT: For evaluation and management of Acute Kidney Injury . (My recommendations will be communicated by way of shared medical record.)      RECOMMENDATIONS:   - bicarb infusion 100 mEq at 100 cc/hr  - covid pending , was hypoxia needing supplemental O2, avoid fluid overload   - hold home Lisinopril   - check urine lytes, f/up Renal US   - monitor PVR w/ bladder scan for jarvis insertion need. - no need for dialysis , but at higher risk for decompensation, needing closer monitoring. D/C plan from renal stand point:  - expect 3-4 days, w/ ASHWINI, hypoxia      IMPRESSION:       Admitted for:  ASHWINI (acute kidney injury) ([address] [N17.9]  Acute respiratory failure with hypoxemia ([address] [J96.01]  Community acquired pneumonia of left lung, unspecified part of lung [J18.9]  Pneumonia due to COVID-19 virus [U07.1, J12.82]      ASHWINI (on non -CKD: likely stage 2 ):   - Oligouric  - BL Scr- 0.9 as off 2021 ---> 2.1 on admission  -: Etiology of ASHWINI - presumed clinically ATN   - other differentials: ?covid related nephropathy, unlikely GN / TI / TMA process   - UA : reviewed = fine cast = ATN        Associated problems:   Hyponatremia - moderate - d/to ASHWINI   Hypokalemia   Acidosis : high -AGMA , d/to ASHWINI, LA WNL        Other major problems: Management per primary and other consulting teams.    // SOB  Hypoxia  COVID r/o    Hospital Problems         Last Modified POA    * (Principal) Pneumonia due to COVID-19 virus 2021 Yes    HTN (hypertension) 2021 Yes    GERD (gastroesophageal reflux disease) 2021 Yes    Acute respiratory failure with hypoxia ([address] 2021 Yes    ASHWINI (acute kidney injury) ([address] 9/25/2021 Yes    Hyponatremia 9/25/2021 Yes    Anxiety 9/25/2021 Yes    Obesity (BMI 30-39.9) 9/25/2021 Yes        : other supportive care :   - Check daily renal function panel with electrolytes-phosphorus  - Strict monitoring of I/Os, daily weight  - Renal feeds/diet  - Current medications reviewed. - Nephrotoxic medications have been discontinued. - Dose adjusted and appropriate. - Dose meds for eGFR <15 mL/min/1.73m2 during ASHWINI    - Avoid heavy opioids due to renal failure - may use very low dose dilaudid / fentanyl with close monitoring of CNS and respiratory depression. Please refer to the orders. High Complexity. Multiple complex problems. Discussed with patient 's treatment team- Dr Diomedes Tolentino     Time spent > 30 (~35) minutes. Thank you for allowing me to participate in this patient's care. Please do not hesitate to contact me anytime. We will follow along with you. Santana Merchant MD,  [facility] [address] [place] [address] [place]: [phone] or [address]  Fax: [phone]        ========================================================   ========================================================      This patient is COVID-19 rule out , so in a strict isolation, as per current protocol; So in order to preserve PPE supply and to avoid unnecessary exposure to prevent transmission of COVID-19; this patient was NOT examined face to face, as risk of contact outweighs the benefits and likely would not change much of my clinical management. HPI, review of system and physical exam was limited, as it was mainly obtained from chart review and the primary team. But all relevant records and diagnostic tests were reviewed, including laboratory and imaging results. Patient's care is being coordinated with the primary service. CHIEF COMPLAINT:   Chief Complaint   Patient presents with    Fatigue     Pt. arrived via EMS with c/o fatigue x 3 weeks and SOB.   Pt. arrived on 4L via cannula to maintain sats according to EMS     History Obtained From:  Patient's treatment team + Electronic Medical Records    HPI: Mr. Ginger Brewster is a 57 y.o. male with significant past medical history as below:   Past Medical History:   Diagnosis Date    Anxiety     GERD (gastroesophageal reflux disease)     Heartburn     Hypertension       Presents with Fatigue (Pt. arrived via EMS with c/o fatigue x 3 weeks and SOB. Pt. arrived on 4L via cannula to maintain sats according to EMS)    Admitted with ASHWINI (acute kidney injury) ([facility] [N17.9]  Acute respiratory failure with hypoxemia ([facility] [J96.01]  Community acquired pneumonia of left lung, unspecified part of lung [J18.9]  Pneumonia due to COVID-19 virus [U07.1, J12.82]   Found to have ASHWINI , so we are called for that. Regarding: ASHWINI on CKD   · Duration: acute on chronic   · Location: kidneys  · Severity: Severe    · Timing: continous  · Context (ex: related to condition):   , refer to my assessment / impression. · Modifying factors (ex: medications, interventions):   , refer to my plan / recommendation. · Associated signs & symptoms (ex: edema, SOB): As mentioned above in CC and HPI      Past medical, Surgical, Social, Family medical history reviewed by me.      PAST MEDICAL HISTORY:   Past Medical History:   Diagnosis Date    Anxiety     GERD (gastroesophageal reflux disease)     Heartburn     Hypertension      PAST SURGICAL HISTORY:   Past Surgical History:   Procedure Laterality Date    HERNIA REPAIR      TESTICLE SURGERY      spermatocele     FAMILY HISTORY:   Family History   Problem Relation Age of Onset    Heart Disease Father     High Cholesterol Father     High Blood Pressure Father     High Blood Pressure Maternal Grandfather     Stroke Maternal Grandfather     Cancer Paternal Grandfather         lung    Other Mother         colectomy due to diverticulitis     SOCIAL HISTORY:   Social History     Socioeconomic History    Marital status:      Spouse name: Not on file    Number of children: Not on file    Years of education: Not on file    Highest education level: Not on file   Occupational History    Not on file   Tobacco Use    Smoking status: Former Smoker     Packs/day: 1.00     Types: Cigarettes     Quit date: 10/6/2012     Years since quittin.9    Smokeless tobacco: Never Used   Substance and Sexual Activity    Alcohol use: No     Alcohol/week: 0.0 standard drinks     Comment: quit     Drug use: No    Sexual activity: Not on file   Other Topics Concern    Not on file   Social History Narrative    Not on file     Social Determinants of Health     Financial Resource Strain:     Difficulty of Paying Living Expenses:    Food Insecurity:     Worried About Running Out of Food in the Last Year:     [address] in the Last Year:    Transportation Needs:     Lack of Transportation (Medical):  Lack of Transportation (Non-Medical):    Physical Activity:     Days of Exercise per Week:     Minutes of Exercise per Session:    Stress:     Feeling of Stress :    Social Connections:     Frequency of Communication with Friends and Family:     Frequency of Social Gatherings with Friends and Family:     Attends Judaism Services:     Active Member of Clubs or Organizations:     Attends Club or Organization Meetings:     Marital Status:    Intimate Partner Violence:     Fear of Current or Ex-Partner:     Emotionally Abused:     Physically Abused:     Sexually Abused:           MEDICATIONS: reviewed by me. Medications Prior to Admission:  No current facility-administered medications on file prior to encounter. Current Outpatient Medications on File Prior to Encounter   Medication Sig Dispense Refill    oxyCODONE-acetaminophen (PERCOCET)  MG per tablet Take 1 tablet by mouth every 8 hours as needed for Pain.       lisinopril (PRINIVIL;ZESTRIL) 20 MG tablet TAKE 1 TABLET BY MOUTH DAILY 30 tablet 1    omeprazole (PRILOSEC) 20 MG capsule Take 20 mg by mouth daily      HYDROcodone-acetaminophen (NORCO) 5-325 MG per tablet Take 1 tablet by mouth 2 times daily      fluticasone (FLONASE) 50 MCG/ACT nasal spray 2 sprays by Nasal route daily      baclofen (LIORESAL) 10 MG tablet Take 10 mg by mouth 2 times daily as needed      DULoxetine (CYMBALTA) 30 MG capsule Take 1 capsule by mouth daily 30 capsule 3    ALPRAZolam (XANAX) 0.5 MG tablet Take 1 tablet by mouth 2 times daily as needed for Sleep 60 tablet 0    Melatonin 5 MG TABS tablet Take 1 tablet by mouth nightly as needed.            Current Facility-Administered Medications:     sodium chloride flush 0.9 % injection 5-40 mL, 5-40 mL, IntraVENous, 2 times per day, Emelyn Warner MD    sodium chloride flush 0.9 % injection 5-40 mL, 5-40 mL, IntraVENous, PRN, Terry Spann MD    0.9 % sodium chloride infusion, 25 mL, IntraVENous, PRN, Emelyn Warner MD    famotidine (PEPCID) tablet 20 mg, 20 mg, Oral, BID, Emelyn Warner MD, 20 mg at 09/25/21 0301    aluminum & magnesium hydroxide-simethicone (MAALOX) 200-200-20 MG/5ML suspension 30 mL, 30 mL, Oral, Q6H PRN, Emelyn Warner MD    ondansetron (ZOFRAN-ODT) disintegrating tablet 4 mg, 4 mg, Oral, Q8H PRN **OR** ondansetron (ZOFRAN) injection 4 mg, 4 mg, IntraVENous, Q6H PRN, Emelyn Warner MD    polyethylene glycol (GLYCOLAX) packet 17 g, 17 g, Oral, Daily PRN, Emelyn Warner MD    acetaminophen (TYLENOL) tablet 650 mg, 650 mg, Oral, Q6H PRN **OR** acetaminophen (TYLENOL) suppository 650 mg, 650 mg, Rectal, Q6H PRN, Terry Spann MD    guaiFENesin-dextromethorphan (ROBITUSSIN DM) 100-10 MG/5ML syrup 5 mL, 5 mL, Oral, Q4H PRN, Emelyn Warner MD    Vitamin D (CHOLECALCIFEROL) tablet 2,000 Units, 2,000 Units, Oral, Daily, Terry Spann MD    dexamethasone (DECADRON) 20 mg in sodium chloride 0.9 % IVPB, 20 mg, IntraVENous, Daily, Sujata Thorne MD    sodium bicarbonate 100 mEq in dextrose 5 % 1,000 mL infusion, , IntraVENous, Continuous, Karla Carter MD, Last Rate: 100 mL/hr at 09/25/21 0410, Restarted at 09/25/21 0410    heparin (porcine) injection 7,260 Units, 80 Units/kg, IntraVENous, PRN, Karla Carter MD    heparin (porcine) injection 3,630 Units, 40 Units/kg, IntraVENous, PRN, Karla Carter MD    heparin 25,000 units in dextrose 5% 250 mL (premix) infusion, 5-30 Units/kg/hr, IntraVENous, Continuous, Terry Spann MD, Last Rate: 16.3 mL/hr at 09/25/21 0300, 18 Units/kg/hr at 09/25/21 0300      Allergies reviewed by me: Codeine    REVIEW OF SYSTEMS  Review of systems not obtained due to patient factors      PHYSICAL EXAM:  Recent vital signs and recent I/Os reviewed by me.      Wt Readings from Last 3 Encounters:   09/25/21 203 lb 1.6 oz (92.1 kg)   09/30/15 208 lb 12.8 oz (94.7 kg)   07/17/14 195 lb (88.5 kg)     BP Readings from Last 3 Encounters:   09/25/21 98/64   09/30/15 (!) 130/100   07/17/14 122/90     Patient Vitals for the past 24 hrs:   BP Temp Temp src Pulse Resp SpO2 Height Weight   09/25/21 0409 98/64 98 °F (36.7 °C) Oral 86 24 91 % -- --   09/25/21 0208 -- -- -- -- -- 91 % -- --   09/25/21 0204 -- -- -- -- -- -- -- 203 lb 1.6 oz (92.1 kg)   09/25/21 0202 109/76 98 °F (36.7 °C) Oral 95 28 90 % -- --   09/25/21 0115 92/67 -- -- 98 (!) 32 90 % -- --   09/25/21 0100 97/76 -- -- 100 24 90 % -- --   09/25/21 0045 107/82 -- -- 99 30 93 % -- --   09/25/21 0030 94/60 -- -- 99 28 90 % -- --   09/25/21 0015 107/76 -- -- 100 27 91 % -- --   09/25/21 0000 102/76 -- -- 100 18 -- -- --   09/24/21 2330 -- -- -- 97 (!) 31 -- -- --   09/24/21 2315 101/79 -- -- 90 20 -- -- --   09/24/21 2245 -- -- -- 107 25 95 % -- --   09/24/21 2132 -- -- -- -- -- -- 5' 7.5\" (1.715 m) 200 lb (90.7 kg)   09/24/21 2130 126/63 -- -- 107 23 91 % -- --   09/24/21 2129 (!) 129/104 98.8 °F (37.1 °C) Oral 92 (!) 33 91 % -- --     No intake or output data in the 24 hours ending 09/25/21 0850       Physical exam:    In-person bedside physical examination deferred. Pursuant to the emergency declaration under the [address] and the [employer] Act, this clinical encounter was conducted to provide necessary medical care. (Also consistent with new provisions and guidance offered by [facility] on March 18, 2020 in setting of COVID 19 outbreak and in order to preserve personal protective equipment in accordance with the flexibilities announced by CMS on March 30, 2020)   References: [email or web address]://[email or web address] [facility]/Portals/0/Resources/COVID-19/3_18%20Telemed%20Guidance%20Updated%20March%2018. pdf?[email or web address]                      [email or web address]://[email or web address] [facility]/Portals/0/Resources/COVID-19/3_18%20Telemed%20Guidance%20Updated%20March%2018. pdf?gci=[email or web address]                      [email or web address] pdf    However, I did visually inspect the patient and observed the following:      Vitals signs reviewed. Constitutional:       General: not in acute distress. Appearance:   ill-appearing. HENT:      Head: Normocephalic and atraumatic. Nose: Nose normal.      Mouth/Throat:      Mouth: Mucous membranes are moist.   Eyes:      General: No scleral icterus. Conjunctiva/sclera: Conjunctivae normal.   Neck:      Musculoskeletal: Normal range of motion and neck supple. Cardiovascular:      Rate and Rhythm: Normal rate. Pulmonary:      Effort: Pulmonary effort is ab-normal.   Abdominal:      General: There is no distension. Musculoskeletal:      Right lower leg: edema. Left lower leg: edema. Skin:     Coloration: Skin is not jaundiced. Neurological:      General: deferred          DATA:  Diagnostic tests reviewed by me for today's visit:   (AS NEEDED FOR MY EVALUATION AND MANAGEMENT).        Recent Labs 09/24/21 2234 09/25/21  0422   WBC 13.6* 13.4*   HCT 47.4 44.1    172     Iron Saturation:  No components found for: PERCENTFE  FERRITIN:  No results found for: FERRITIN  IRON:  No results found for: IRON  TIBC:  No results found for: TIBC    Recent Labs     09/24/21 2234 09/25/21  0422   * 126*  126*   K 3.4* 3.9   CL 91* 92*   CO2 17* 17*   BUN 90* 91*   CREATININE 2.1* 2.2*     Recent Labs     09/24/21 2234 09/25/21  0422   CALCIUM 9.0 8.6     No results for input(s): PH, PCO2, PO2 in the last 72 hours.     Invalid input(s): D0CDOMIIICKB, INSPIREDO2    ABG:  No results found for: PH, PCO2, PO2, HCO3, BE, THGB, TCO2, O2SAT  VBG:  No results found for: PHVEN, UJG0GCD, BEVEN, V8NVZQLK    LDH:    Lab Results   Component Value Date     09/25/2021     Uric Acid:  No results found for: LABURIC, URICACID    PT/INR:    Lab Results   Component Value Date    PROTIME 15.5 09/25/2021    INR 1.35 09/25/2021     Warfarin PT/INR:  No components found for: Cherre Olp  PTT:    Lab Results   Component Value Date    APTT 25.0 09/25/2021   [APTT}  Last 3 Troponin:    Lab Results   Component Value Date    TROPONINI <0.01 09/25/2021    TROPONINI <0.01 09/24/2021    TROPONINI 0.01 03/19/2010       U/A:    Lab Results   Component Value Date    COLORU YELLOW 09/24/2021    PROTEINU 100 09/24/2021    PHUR 5.5 09/24/2021    WBCUA 11 09/24/2021    RBCUA 5 09/24/2021    CLARITYU CLOUDY 09/24/2021    SPECGRAV 1.019 09/24/2021    LEUKOCYTESUR Negative 09/24/2021    UROBILINOGEN 0.2 09/24/2021    BILIRUBINUR Negative 09/24/2021    BLOODU MODERATE 09/24/2021    GLUCOSEU Negative 09/24/2021     Microalbumen/Creatinine ratio:  No components found for: RUCREAT  24 Hour Urine for Protein:  No components found for: RAWUPRO, UHRS3, FNAJ43JE, UTV3  24 Hour Urine for Creatinine Clearance:  No components found for: CREAT4, UHRS10, UTV10  Urine Toxicology:  No components found for: Rashaun Jimstein, ICOCAINE, IMARTHC,

## 2021-09-25 NOTE — PLAN OF CARE
Problem: Falls - Risk of:  Goal: Will remain free from falls  Description: Will remain free from falls  Outcome: Ongoing  Note: Patient remains absent from falls at this time. Remains alert and oriented, in bed with call light and belongings in reach. Non-slip footwear on and 2/4 siderails raised. Bed remains in lowest/locked position at all times. Fall precautions in place. Patient encouraged to use call light to request assistance, v/u.  Will continue to monitor.

## 2021-09-26 LAB
A/G RATIO: 0.8 (ref 1.1–2.2)
ALBUMIN SERPL-MCNC: 3.1 G/DL (ref 3.4–5)
ALP BLD-CCNC: 69 U/L (ref 40–129)
ALT SERPL-CCNC: 30 U/L (ref 10–40)
ANION GAP SERPL CALCULATED.3IONS-SCNC: 15 MMOL/L (ref 3–16)
APTT: 120.9 SEC (ref 26.2–38.6)
APTT: 50.4 SEC (ref 26.2–38.6)
APTT: 57.4 SEC (ref 26.2–38.6)
APTT: 85.7 SEC (ref 26.2–38.6)
AST SERPL-CCNC: 39 U/L (ref 15–37)
BASOPHILS ABSOLUTE: 0 K/UL (ref 0–0.2)
BASOPHILS RELATIVE PERCENT: 0.2 %
BILIRUB SERPL-MCNC: <0.2 MG/DL (ref 0–1)
BUN BLDV-MCNC: 90 MG/DL (ref 7–20)
C-REACTIVE PROTEIN: 155.8 MG/L (ref 0–5.1)
CALCIUM SERPL-MCNC: 8.4 MG/DL (ref 8.3–10.6)
CHLORIDE BLD-SCNC: 90 MMOL/L (ref 99–110)
CO2: 21 MMOL/L (ref 21–32)
CREAT SERPL-MCNC: 2.4 MG/DL (ref 0.8–1.3)
D DIMER: 393 NG/ML DDU (ref 0–229)
EOSINOPHILS ABSOLUTE: 0 K/UL (ref 0–0.6)
EOSINOPHILS RELATIVE PERCENT: 0 %
FERRITIN: 3230 NG/ML (ref 30–400)
GFR AFRICAN AMERICAN: 33
GFR NON-AFRICAN AMERICAN: 28
GLOBULIN: 3.8 G/DL
GLUCOSE BLD-MCNC: 130 MG/DL (ref 70–99)
GLUCOSE BLD-MCNC: 173 MG/DL (ref 70–99)
GLUCOSE BLD-MCNC: 181 MG/DL (ref 70–99)
GLUCOSE BLD-MCNC: 181 MG/DL (ref 70–99)
GLUCOSE BLD-MCNC: 228 MG/DL (ref 70–99)
HCT VFR BLD CALC: 41.3 % (ref 40.5–52.5)
HEMOGLOBIN: 14.5 G/DL (ref 13.5–17.5)
L. PNEUMOPHILA SEROGP 1 UR AG: NORMAL
LYMPHOCYTES ABSOLUTE: 0.5 K/UL (ref 1–5.1)
LYMPHOCYTES RELATIVE PERCENT: 4.2 %
MAGNESIUM: 3 MG/DL (ref 1.8–2.4)
MCH RBC QN AUTO: 27 PG (ref 26–34)
MCHC RBC AUTO-ENTMCNC: 35.1 G/DL (ref 31–36)
MCV RBC AUTO: 76.9 FL (ref 80–100)
MONOCYTES ABSOLUTE: 0.7 K/UL (ref 0–1.3)
MONOCYTES RELATIVE PERCENT: 6.2 %
NEUTROPHILS ABSOLUTE: 10.6 K/UL (ref 1.7–7.7)
NEUTROPHILS RELATIVE PERCENT: 89.4 %
PDW BLD-RTO: 14.6 % (ref 12.4–15.4)
PERFORMED ON: ABNORMAL
PLATELET # BLD: 179 K/UL (ref 135–450)
PMV BLD AUTO: 9.1 FL (ref 5–10.5)
POTASSIUM REFLEX MAGNESIUM: 3.5 MMOL/L (ref 3.5–5.1)
RBC # BLD: 5.37 M/UL (ref 4.2–5.9)
SODIUM BLD-SCNC: 126 MMOL/L (ref 136–145)
STREP PNEUMONIAE ANTIGEN, URINE: NORMAL
TOTAL PROTEIN: 6.9 G/DL (ref 6.4–8.2)
URINE CULTURE, ROUTINE: NORMAL
WBC # BLD: 11.9 K/UL (ref 4–11)

## 2021-09-26 PROCEDURE — 6370000000 HC RX 637 (ALT 250 FOR IP): Performed by: INTERNAL MEDICINE

## 2021-09-26 PROCEDURE — 6360000002 HC RX W HCPCS: Performed by: INTERNAL MEDICINE

## 2021-09-26 PROCEDURE — 2700000000 HC OXYGEN THERAPY PER DAY

## 2021-09-26 PROCEDURE — 86140 C-REACTIVE PROTEIN: CPT

## 2021-09-26 PROCEDURE — 6370000000 HC RX 637 (ALT 250 FOR IP): Performed by: HOSPITALIST

## 2021-09-26 PROCEDURE — 85379 FIBRIN DEGRADATION QUANT: CPT

## 2021-09-26 PROCEDURE — 2060000000 HC ICU INTERMEDIATE R&B

## 2021-09-26 PROCEDURE — 2580000003 HC RX 258: Performed by: INTERNAL MEDICINE

## 2021-09-26 PROCEDURE — 2500000003 HC RX 250 WO HCPCS: Performed by: INTERNAL MEDICINE

## 2021-09-26 PROCEDURE — 83735 ASSAY OF MAGNESIUM: CPT

## 2021-09-26 PROCEDURE — 6360000002 HC RX W HCPCS: Performed by: HOSPITALIST

## 2021-09-26 PROCEDURE — 85730 THROMBOPLASTIN TIME PARTIAL: CPT

## 2021-09-26 PROCEDURE — 94761 N-INVAS EAR/PLS OXIMETRY MLT: CPT

## 2021-09-26 PROCEDURE — 36415 COLL VENOUS BLD VENIPUNCTURE: CPT

## 2021-09-26 PROCEDURE — 85025 COMPLETE CBC W/AUTO DIFF WBC: CPT

## 2021-09-26 PROCEDURE — 51702 INSERT TEMP BLADDER CATH: CPT

## 2021-09-26 PROCEDURE — 80053 COMPREHEN METABOLIC PANEL: CPT

## 2021-09-26 PROCEDURE — 51798 US URINE CAPACITY MEASURE: CPT

## 2021-09-26 RX ORDER — MORPHINE SULFATE 2 MG/ML
2 INJECTION, SOLUTION INTRAMUSCULAR; INTRAVENOUS EVERY 4 HOURS PRN
Status: DISCONTINUED | OUTPATIENT
Start: 2021-09-26 | End: 2021-10-02 | Stop reason: HOSPADM

## 2021-09-26 RX ORDER — POLYVINYL ALCOHOL 14 MG/ML
1 SOLUTION/ DROPS OPHTHALMIC EVERY 4 HOURS PRN
Status: DISCONTINUED | OUTPATIENT
Start: 2021-09-26 | End: 2021-10-02 | Stop reason: HOSPADM

## 2021-09-26 RX ADMIN — SODIUM CHLORIDE 25 ML: 9 INJECTION, SOLUTION INTRAVENOUS at 12:23

## 2021-09-26 RX ADMIN — FAMOTIDINE 20 MG: 20 TABLET, FILM COATED ORAL at 20:21

## 2021-09-26 RX ADMIN — ACETAMINOPHEN 650 MG: 325 TABLET ORAL at 04:45

## 2021-09-26 RX ADMIN — Medication 10 ML: at 08:31

## 2021-09-26 RX ADMIN — INSULIN LISPRO 1 UNITS: 100 INJECTION, SOLUTION INTRAVENOUS; SUBCUTANEOUS at 12:24

## 2021-09-26 RX ADMIN — DEXAMETHASONE SODIUM PHOSPHATE 20 MG: 4 INJECTION, SOLUTION INTRA-ARTICULAR; INTRALESIONAL; INTRAMUSCULAR; INTRAVENOUS; SOFT TISSUE at 08:43

## 2021-09-26 RX ADMIN — Medication 10 ML: at 20:24

## 2021-09-26 RX ADMIN — HEPARIN SODIUM 3630 UNITS: 1000 INJECTION INTRAVENOUS; SUBCUTANEOUS at 18:55

## 2021-09-26 RX ADMIN — FAMOTIDINE 20 MG: 20 TABLET, FILM COATED ORAL at 08:31

## 2021-09-26 RX ADMIN — ONDANSETRON 4 MG: 2 INJECTION INTRAMUSCULAR; INTRAVENOUS at 13:30

## 2021-09-26 RX ADMIN — SODIUM CHLORIDE 25 ML: 9 INJECTION, SOLUTION INTRAVENOUS at 08:43

## 2021-09-26 RX ADMIN — REMDESIVIR 100 MG: 100 INJECTION, POWDER, LYOPHILIZED, FOR SOLUTION INTRAVENOUS at 12:24

## 2021-09-26 RX ADMIN — OXYCODONE AND ACETAMINOPHEN 1 TABLET: 10; 325 TABLET ORAL at 00:14

## 2021-09-26 RX ADMIN — HEPARIN SODIUM 3630 UNITS: 1000 INJECTION INTRAVENOUS; SUBCUTANEOUS at 05:26

## 2021-09-26 RX ADMIN — POLYVINYL ALCOHOL 1 DROP: 14 SOLUTION/ DROPS OPHTHALMIC at 01:45

## 2021-09-26 RX ADMIN — HEPARIN SODIUM 9 UNITS/KG/HR: 10000 INJECTION, SOLUTION INTRAVENOUS at 03:00

## 2021-09-26 RX ADMIN — MORPHINE SULFATE 2 MG: 2 INJECTION, SOLUTION INTRAMUSCULAR; INTRAVENOUS at 13:54

## 2021-09-26 RX ADMIN — OXYCODONE AND ACETAMINOPHEN 1 TABLET: 10; 325 TABLET ORAL at 18:53

## 2021-09-26 RX ADMIN — MORPHINE SULFATE 2 MG: 2 INJECTION, SOLUTION INTRAMUSCULAR; INTRAVENOUS at 23:44

## 2021-09-26 RX ADMIN — OXYCODONE AND ACETAMINOPHEN 1 TABLET: 10; 325 TABLET ORAL at 08:30

## 2021-09-26 RX ADMIN — MORPHINE SULFATE 2 MG: 2 INJECTION, SOLUTION INTRAMUSCULAR; INTRAVENOUS at 18:04

## 2021-09-26 RX ADMIN — Medication 2000 UNITS: at 08:31

## 2021-09-26 RX ADMIN — POLYVINYL ALCOHOL 1 DROP: 14 SOLUTION/ DROPS OPHTHALMIC at 20:21

## 2021-09-26 RX ADMIN — HEPARIN SODIUM 8 UNITS/KG/HR: 10000 INJECTION, SOLUTION INTRAVENOUS at 13:59

## 2021-09-26 RX ADMIN — TOCILIZUMAB 810 MG: 180 INJECTION, SOLUTION SUBCUTANEOUS at 00:11

## 2021-09-26 RX ADMIN — INSULIN LISPRO 1 UNITS: 100 INJECTION, SOLUTION INTRAVENOUS; SUBCUTANEOUS at 20:25

## 2021-09-26 RX ADMIN — INSULIN LISPRO 1 UNITS: 100 INJECTION, SOLUTION INTRAVENOUS; SUBCUTANEOUS at 18:05

## 2021-09-26 ASSESSMENT — PAIN DESCRIPTION - PAIN TYPE
TYPE: ACUTE PAIN
TYPE: CHRONIC PAIN
TYPE: CHRONIC PAIN
TYPE: ACUTE PAIN
TYPE: CHRONIC PAIN
TYPE: CHRONIC PAIN

## 2021-09-26 ASSESSMENT — PAIN DESCRIPTION - LOCATION
LOCATION: BACK
LOCATION: HEAD
LOCATION: BACK
LOCATION: BACK;NECK
LOCATION: BACK
LOCATION: HEAD;NECK;BACK;SHOULDER

## 2021-09-26 ASSESSMENT — PAIN SCALES - GENERAL
PAINLEVEL_OUTOF10: 7
PAINLEVEL_OUTOF10: 4
PAINLEVEL_OUTOF10: 3
PAINLEVEL_OUTOF10: 3
PAINLEVEL_OUTOF10: 7
PAINLEVEL_OUTOF10: 3
PAINLEVEL_OUTOF10: 7
PAINLEVEL_OUTOF10: 8
PAINLEVEL_OUTOF10: 2
PAINLEVEL_OUTOF10: 8
PAINLEVEL_OUTOF10: 8
PAINLEVEL_OUTOF10: 6
PAINLEVEL_OUTOF10: 6
PAINLEVEL_OUTOF10: 3
PAINLEVEL_OUTOF10: 4

## 2021-09-26 ASSESSMENT — PAIN DESCRIPTION - DESCRIPTORS
DESCRIPTORS: ACHING
DESCRIPTORS: ACHING

## 2021-09-26 ASSESSMENT — PAIN DESCRIPTION - ORIENTATION
ORIENTATION: ANTERIOR
ORIENTATION: ANTERIOR

## 2021-09-26 ASSESSMENT — PAIN DESCRIPTION - ONSET
ONSET: GRADUAL
ONSET: GRADUAL

## 2021-09-26 NOTE — PLAN OF CARE
Problem: Falls - Risk of:  Goal: Will remain free from falls  Outcome: Ongoing  Goal: Absence of physical injury  Outcome: Ongoing     Problem: Pain:  Goal: Pain level will decrease  Outcome: Ongoing  Goal: Control of acute pain  Outcome: Ongoing  Goal: Control of chronic pain  Outcome: Ongoing     Problem: Airway Clearance - Ineffective  Goal: Achieve or maintain patent airway  Outcome: Ongoing     Problem: Gas Exchange - Impaired  Goal: Absence of hypoxia  Outcome: Ongoing  Goal: Promote optimal lung function  Outcome: Ongoing     Problem: Breathing Pattern - Ineffective  Goal: Ability to achieve and maintain a regular respiratory rate  Outcome: Ongoing     Problem:  Body Temperature -  Risk of, Imbalanced  Goal: Ability to maintain a body temperature within defined limits  Outcome: Ongoing  Goal: Will regain or maintain usual level of consciousness  Outcome: Ongoing  Goal: Complications related to the disease process, condition or treatment will be avoided or minimized  Outcome: Ongoing     Problem: Isolation Precautions - Risk of Spread of Infection  Goal: Prevent transmission of infection  Outcome: Ongoing     Problem: Risk for Fluid Volume Deficit  Goal: Maintain normal heart rhythm  Outcome: Ongoing  Goal: Maintain absence of muscle cramping  Outcome: Ongoing  Goal: Maintain normal serum potassium, sodium, calcium, phosphorus, and pH  Outcome: Ongoing     Problem: Loneliness or Risk for Loneliness  Goal: Demonstrate positive use of time alone when socialization is not possible  Outcome: Ongoing     Problem: Fatigue  Goal: Verbalize increase energy and improved vitality  Outcome: Ongoing

## 2021-09-26 NOTE — PLAN OF CARE
Problem: Falls - Risk of:  Goal: Will remain free from falls  Description: Will remain free from falls  Outcome: Ongoing  Goal: Absence of physical injury  Description: Absence of physical injury  Outcome: Ongoing     Problem: Pain:  Goal: Pain level will decrease  Description: Pain level will decrease  Outcome: Ongoing  Goal: Control of acute pain  Description: Control of acute pain  Outcome: Ongoing  Note: Morphine added to control pain  Goal: Control of chronic pain  Description: Control of chronic pain  Outcome: Ongoing     Problem: Airway Clearance - Ineffective  Goal: Achieve or maintain patent airway  Outcome: Ongoing     Problem: Gas Exchange - Impaired  Goal: Absence of hypoxia  Outcome: Ongoing  Goal: Promote optimal lung function  Outcome: Ongoing     Problem: Breathing Pattern - Ineffective  Goal: Ability to achieve and maintain a regular respiratory rate  Outcome: Ongoing     Problem:  Body Temperature -  Risk of, Imbalanced  Goal: Ability to maintain a body temperature within defined limits  Outcome: Ongoing  Goal: Will regain or maintain usual level of consciousness  Outcome: Ongoing  Goal: Complications related to the disease process, condition or treatment will be avoided or minimized  Outcome: Ongoing     Problem: Isolation Precautions - Risk of Spread of Infection  Goal: Prevent transmission of infection  Outcome: Ongoing     Problem: Nutrition Deficits  Goal: Optimize nutritional status  Outcome: Ongoing     Problem: Risk for Fluid Volume Deficit  Goal: Maintain normal heart rhythm  Outcome: Ongoing  Goal: Maintain absence of muscle cramping  Outcome: Ongoing  Goal: Maintain normal serum potassium, sodium, calcium, phosphorus, and pH  Outcome: Ongoing     Problem: Loneliness or Risk for Loneliness  Goal: Demonstrate positive use of time alone when socialization is not possible  Outcome: Ongoing     Problem: Fatigue  Goal: Verbalize increase energy and improved vitality  Outcome: Ongoing Problem: Patient Education: Go to Patient Education Activity  Goal: Patient/Family Education  Outcome: Ongoing

## 2021-09-26 NOTE — PROGRESS NOTES
Updated hospitalist via [employer] [facility] that pt is on 8L with confirmed covid case and per pharmacy he would qualify for remdesivir.

## 2021-09-26 NOTE — PROGRESS NOTES
Clinical Pharmacy Note    Pharmacy consulted by Dr. Johanna Thompson to start remdesivir and Actemra. Patient is COVID positive and currently on 8L O2. CRP is 267.2. Patient meets criteria for both remdesivir and Actemra. Orders have been placed.     Estefanía Feliz, PharmD  9/25/2021 9:51 PM

## 2021-09-26 NOTE — PROGRESS NOTES
[facility] PROGRESS NOTE    9/26/2021 11:42 AM        Name: Alex Licea Admitted: 9/24/2021  Primary Care Provider: Ambar Hoyos MD (Tel: [phone])                        Subjective:  . No acute events overnight. Resting well. Pain control. Diet ok.    Labs reviewed  worsenign oxygen yesteday  - covid positive   Reviewed interval ancillary notes    Current Medications  polyvinyl alcohol (LIQUIFILM TEARS) 1.4 % ophthalmic solution 1 drop, Q4H PRN  sodium chloride flush 0.9 % injection 5-40 mL, 2 times per day  sodium chloride flush 0.9 % injection 5-40 mL, PRN  0.9 % sodium chloride infusion, PRN  famotidine (PEPCID) tablet 20 mg, BID  aluminum & magnesium hydroxide-simethicone (MAALOX) 200-200-20 MG/5ML suspension 30 mL, Q6H PRN  ondansetron (ZOFRAN-ODT) disintegrating tablet 4 mg, Q8H PRN   Or  ondansetron (ZOFRAN) injection 4 mg, Q6H PRN  polyethylene glycol (GLYCOLAX) packet 17 g, Daily PRN  acetaminophen (TYLENOL) tablet 650 mg, Q6H PRN   Or  acetaminophen (TYLENOL) suppository 650 mg, Q6H PRN  guaiFENesin-dextromethorphan (ROBITUSSIN DM) 100-10 MG/5ML syrup 5 mL, Q4H PRN  Vitamin D (CHOLECALCIFEROL) tablet 2,000 Units, Daily  dexamethasone (DECADRON) 20 mg in sodium chloride 0.9 % IVPB, Daily  insulin lispro (1 Unit Dial) 0-6 Units, TID WC  insulin lispro (1 Unit Dial) 0-3 Units, Nightly  glucose (GLUTOSE) 40 % oral gel 15 g, PRN  dextrose 50 % IV solution, PRN  glucagon (rDNA) injection 1 mg, PRN  dextrose 5 % solution, PRN  oxyCODONE-acetaminophen (PERCOCET)  MG per tablet 1 tablet, Q8H PRN  remdesivir 100 mg in sodium chloride 0.9 % 250 mL IVPB, Q24H  heparin (porcine) injection 7,260 Units, PRN  heparin (porcine) injection 3,630 Units, PRN  heparin 25,000 units in dextrose 5% 250 mL (premix) infusion, Continuous        Objective:  BP (!) 138/94 Pulse 68   Temp 97.4 °F (36.3 °C) (Oral)   Resp 20   Ht 5' 7.5\" (1.715 m)   Wt 203 lb 1.6 oz (92.1 kg)   SpO2 91%   BMI 31.34 kg/m²     Intake/Output Summary (Last 24 hours) at 9/26/2021 1142  Last data filed at 9/26/2021 0446  Gross per 24 hour   Intake 1798.27 ml   Output 800 ml   Net 998.27 ml      Wt Readings from Last 3 Encounters:   09/25/21 203 lb 1.6 oz (92.1 kg)   09/30/15 208 lb 12.8 oz (94.7 kg)   07/17/14 195 lb (88.5 kg)       General appearance:  Appears comfortable  Eyes: Sclera clear. Pupils equal.  ENT: Moist oral mucosa. Trachea midline, no adenopathy. Cardiovascular: Regular rhythm, normal S1, S2. No murmur. No edema in lower extremities  Respiratory: Not using accessory muscles. Good inspiratory effort. Clear to auscultation bilaterally, no wheeze or crackles. GI: Abdomen soft, no tenderness, not distended, normal bowel sounds  Musculoskeletal: No cyanosis in digits, neck supple  Neurology: CN 2-12 grossly intact. No speech or motor deficits  Psych: Normal affect. Alert and oriented in time, place and person  Skin: Warm, dry, normal turgor    Labs and Tests:  CBC:   Recent Labs     09/24/21 2234 09/25/21 0422 09/26/21  0426   WBC 13.6* 13.4* 11.9*   HGB 16.4 15.3 14.5    172 179     BMP:    Recent Labs     09/24/21 2234 09/24/21  2234 09/25/21  0422 09/25/21  1017 09/26/21  0427   *   < > 126*  126* 128* 126*   K 3.4*  --  3.9  --  3.5   CL 91*  --  92*  --  90*   CO2 17*  --  17*  --  21   BUN 90*  --  91*  --  90*   CREATININE 2.1*  --  2.2*  --  2.4*   GLUCOSE 137*  --  202*  --  173*    < > = values in this interval not displayed.      Hepatic:   Recent Labs     09/24/21  2234 09/25/21 0422 09/26/21 0427   AST 30 45* 39*   ALT 19 24 30   BILITOT 0.5 0.4 <0.2   ALKPHOS 67 73 69       Discussed care with family and patient             Spent 30  minutes with patient and family at bedside and on unit reviewing medical records and labs, spent greater than 50% time counseling patient and family on diagnosis and plan   Problem List  Principal Problem:    Pneumonia due to COVID-19 virus  Active Problems:    HTN (hypertension)    GERD (gastroesophageal reflux disease)    Acute respiratory failure with hypoxia (HCC)    ASHWINI (acute kidney injury) ([facility] [address]    Hyponatremia    Anxiety    Obesity (BMI 30-39. 9)  Resolved Problems:    * No resolved hospital problems. *       Assessment & Plan:   1. Acute hypoxic respiratory failure  - secondary to COVID 19  - worsening oxygen up to 15 L  -started remdisivir and acterma  - high dose steroids. - monitor closely    ASHWINI  - creatine still worse  - monitor  - IVF  - nephrology following. Hyponatremia    Anion gap acidosis           Diet: ADULT DIET;  Regular  Code:Full Code  DVT PPX lovenox       Yannick Painting MD   9/26/2021 11:42 AM

## 2021-09-26 NOTE — PROGRESS NOTES
Physician Progress Note      PATIENT:               Hong Pacheco  CSN #:                  [id number]  :                       1960  ADMIT DATE:       2021 9:17 PM  [address] [place] DATE:  RESPONDING  PROVIDER #:        Leyla Somers MD          QUERY TEXT:    Pt admitted with PNA. Pt noted to have criteria for sepsis. If possible,   please document in the progress notes and/or discharge summary if you are   evaluating and /or treating any of the following: The medical record reflects the following:  Risk Factors: Patient admitted after being sick for 3 weeks at home  Clinical Indicators: WBC 13.6, .2, Procalcitonin 0.31, , resp 33,   Dx with PNA, Resp Failure and ASHWINI  Treatment: IV Fluids, IV Zithromax, IV Rocephin  Options provided:  -- Sepsis, present on admission  -- Sepsis, present on admission, now resolved  -- Sepsis, not present on admission but developed during stay  -- Localized infection such as pneumonia only without Sepsis  -- Sepsis was ruled out  -- Other - I will add my own diagnosis  -- Disagree - Not applicable / Not valid  -- Disagree - Clinically unable to determine / Unknown  -- Refer to Clinical Documentation Reviewer    PROVIDER RESPONSE TEXT:    This patient has sepsis which was present on admission.     Query created by: Chavez Askew on 2021 1:08 PM      Electronically signed by:  Leyla Somers MD 2021 4:25 PM

## 2021-09-26 NOTE — PROGRESS NOTES
Mahajan placed per orders. Pt tolerated well and agreeable to plan. 800 mL hazy yellow urine immediately returned. Specimen collected and sent to lab if needed.

## 2021-09-26 NOTE — PROGRESS NOTES
Nolberto Najjar, MD Hughie Gates, MD Delsa Kitchen, MD               Office: [phone]                      Fax: [phone]             [facility]                   NEPHROLOGY INPATIENT PROGRESS NOTE:     PATIENT NAME: Alex Gustafson  : 1960  MRN: [id number]        RECOMMENDATIONS:   - stop bicarb infusion d/to hypoxia   - covid detected,  hypoxia needing supplemental O2, avoid fluid overload    - will consider lasix, if hypoxia worsening     COVID-19 management:  -ok for Actemra - no renal dosing   -ok for Ramdesivir : short course: 3-5 days. 100 mg   -IV Decadron  -Convalescent plasma not given  -A/C per protocol - to prevent renal microthrombi too   -Pneumonia antibiotic coverage, might need as elevated pro-calcitonin  -Hypoxia monitoring     - hold home Lisinopril   - f/up Renal US   - monitor PVR w/ bladder scan for jarvis insertion need. - no need for dialysis , but at higher risk for decompensation, needing closer monitoring. D/C plan from renal stand point:  - expect 3-4 days, w/ ASHWINI, COVID hypoxia    D/W primary team - Dr Hamida Alfred:       Admitted for:  ASHWINI (acute kidney injury) ([facility] [address] [N17.9]  Acute respiratory failure with hypoxemia ([facility] [address] [J96.01]  Community acquired pneumonia of left lung, unspecified part of lung [J18.9]  Pneumonia due to COVID-19 virus [U07.1, J12.82]      ASHWINI (on non -CKD: likely stage 2 ): worsening   - BL Scr- 0.9 as off 2021 ---> 2.1 on admission  -: Etiology of ASHWINI - presumed clinically ATN   - other differentials: ?covid related nephropathy, unlikely GN / TI / TMA process   - UA : reviewed = fine cast = ATN        Associated problems:   Hyponatremia - moderate - d/to ASHWINI   Hypokalemia   Acidosis : high -AGMA , d/to ASHWINI, LA WNL      Other major problems: Management per primary and other consulting teams.    //   Acute Hypoxia respiratory failure- needing NIPPV   COVID (+) PNA      Hospital Problems         Last Modified POA    * (Principal) Pneumonia due to COVID-19 virus 9/25/2021 Yes    HTN (hypertension) 9/25/2021 Yes    GERD (gastroesophageal reflux disease) 9/25/2021 Yes    Acute respiratory failure with hypoxia ([facility] [address] 9/25/2021 Yes    ASHWINI (acute kidney injury) ([facility] [address] 9/25/2021 Yes    Hyponatremia 9/25/2021 Yes    Anxiety 9/25/2021 Yes    Obesity (BMI 30-39.9) 9/25/2021 Yes        : other supportive care :   - Check daily renal function panel with electrolytes-phosphorus  - Strict monitoring of I/Os, daily weight  - Renal feeds/diet  - Current medications reviewed. - Nephrotoxic medications have been discontinued. - Dose adjusted and appropriate. - Dose meds for eGFR <15 mL/min/1.73m2 during ASHWINI    - Avoid heavy opioids due to renal failure - may use very low dose dilaudid / fentanyl with close monitoring of CNS and respiratory depression. Please refer to the orders. High Complexity. Multiple complex problems. Discussed with patient 's treatment team- Dr Aram Head     Time spent > 30 (~35) minutes. Thank you for allowing me to participate in this patient's care. Please do not hesitate to contact me anytime. We will follow along with you. Kary Jose MD,  [facility] [address] [place] [address] [place]: [phone] or [address]  Fax: [phone]        ========================================================   ========================================================      This patient is COVID-19 positive, so in a strict isolation, as per current protocol; So in order to preserve PPE supply and to avoid unnecessary exposure to prevent transmission of COVID-19; this patient was NOT examined face to face, as risk of contact outweighs the benefits and likely would not change much of my clinical management.    HPI, review of system and physical exam was limited, as it was mainly obtained from chart review and the primary team. But all relevant records and diagnostic tests were reviewed, including laboratory and imaging results. Patient's care is being coordinated with the primary service. Subjective:  Seen via glass   COVID (+)  Hypoxia worsening   Past medical, Surgical, Social, Family medical history reviewed by me. MEDICATIONS: reviewed by me. Medications Prior to Admission:  No current facility-administered medications on file prior to encounter. Current Outpatient Medications on File Prior to Encounter   Medication Sig Dispense Refill    oxyCODONE-acetaminophen (PERCOCET)  MG per tablet Take 1 tablet by mouth every 8 hours as needed for Pain.  lisinopril (PRINIVIL;ZESTRIL) 20 MG tablet TAKE 1 TABLET BY MOUTH DAILY 30 tablet 1    omeprazole (PRILOSEC) 20 MG capsule Take 20 mg by mouth daily      HYDROcodone-acetaminophen (NORCO) 5-325 MG per tablet Take 1 tablet by mouth 2 times daily      fluticasone (FLONASE) 50 MCG/ACT nasal spray 2 sprays by Nasal route daily      baclofen (LIORESAL) 10 MG tablet Take 10 mg by mouth 2 times daily as needed      DULoxetine (CYMBALTA) 30 MG capsule Take 1 capsule by mouth daily 30 capsule 3    ALPRAZolam (XANAX) 0.5 MG tablet Take 1 tablet by mouth 2 times daily as needed for Sleep 60 tablet 0    Melatonin 5 MG TABS tablet Take 1 tablet by mouth nightly as needed.            Current Facility-Administered Medications:     polyvinyl alcohol (LIQUIFILM TEARS) 1.4 % ophthalmic solution 1 drop, 1 drop, Both Eyes, Q4H PRN, Liban Garcia MD, 1 drop at 09/26/21 0145    sodium chloride flush 0.9 % injection 5-40 mL, 5-40 mL, IntraVENous, 2 times per day, Abdirizak Pierre MD, 10 mL at 09/26/21 0831    sodium chloride flush 0.9 % injection 5-40 mL, 5-40 mL, IntraVENous, PRN, Terry Spann MD    0.9 % sodium chloride infusion, 25 mL, IntraVENous, PRN, Abdirizak Pierre MD, Last Rate: 100 mL/hr at 09/26/21 0843, 25 mL at 09/26/21 0843    famotidine (PEPCID) tablet 20 mg, 20 mg, Oral, BID, Terry Spann MD, 20 mg at 09/26/21 3791    aluminum & magnesium hydroxide-simethicone (MAALOX) 200-200-20 MG/5ML suspension 30 mL, 30 mL, Oral, Q6H PRN, Lisette Carrizales MD    ondansetron (ZOFRAN-ODT) disintegrating tablet 4 mg, 4 mg, Oral, Q8H PRN **OR** ondansetron (ZOFRAN) injection 4 mg, 4 mg, IntraVENous, Q6H PRN, Lisette Carrizales MD    polyethylene glycol (GLYCOLAX) packet 17 g, 17 g, Oral, Daily PRN, Lisette Carrizales MD    acetaminophen (TYLENOL) tablet 650 mg, 650 mg, Oral, Q6H PRN, 650 mg at 09/26/21 0445 **OR** acetaminophen (TYLENOL) suppository 650 mg, 650 mg, Rectal, Q6H PRN, Lisette Carrizales MD    guaiFENesin-dextromethorphan (ROBITUSSIN DM) 100-10 MG/5ML syrup 5 mL, 5 mL, Oral, Q4H PRN, Lisette Carrizales MD    Vitamin D (CHOLECALCIFEROL) tablet 2,000 Units, 2,000 Units, Oral, Daily, Lisette Carrizales MD, 2,000 Units at 09/26/21 0831    dexamethasone (DECADRON) 20 mg in sodium chloride 0.9 % IVPB, 20 mg, IntraVENous, Daily, Rosalina Renteria MD, Stopped at 09/26/21 0913    insulin lispro (1 Unit Dial) 0-6 Units, 0-6 Units, SubCUTAneous, TID WC, Regine Covington MD    insulin lispro (1 Unit Dial) 0-3 Units, 0-3 Units, SubCUTAneous, Nightly, Regine Covington MD, 2 Units at 09/25/21 2135    glucose (GLUTOSE) 40 % oral gel 15 g, 15 g, Oral, PRN, Lizeth Martines MD    dextrose 50 % IV solution, 12.5 g, IntraVENous, PRN, Lizeth Martines MD    glucagon (rDNA) injection 1 mg, 1 mg, IntraMUSCular, PRN, Lizeth Martines MD    dextrose 5 % solution, 100 mL/hr, IntraVENous, PRN, Lizeth Martines MD    oxyCODONE-acetaminophen (PERCOCET)  MG per tablet 1 tablet, 1 tablet, Oral, Q8H PRN, Lizeth Martines MD, 1 tablet at 09/26/21 0830    [COMPLETED] remdesivir 200 mg in sodium chloride 0.9 % 250 mL IVPB, 200 mg, IntraVENous, Once, Stopped at 09/25/21 2332 **FOLLOWED BY** remdesivir 100 mg in sodium chloride 0.9 % 250 mL IVPB, 100 mg, IntraVENous, Q24H, Rosalina Renteria MD    heparin (porcine) injection 7,260 Units, 80 Units/kg, IntraVENous, PRN, Terry Christian Greer MD    heparin (porcine) injection 3,630 Units, 40 Units/kg, IntraVENous, PRN, Will Ruby MD, 3,630 Units at 09/26/21 0526    heparin 25,000 units in dextrose 5% 250 mL (premix) infusion, 5-30 Units/kg/hr, IntraVENous, Continuous, Terry Spann MD, Last Rate: 10 mL/hr at 09/26/21 0525, 11 Units/kg/hr at 09/26/21 0525    REVIEW OF SYSTEMS  Review of systems not obtained due to patient factors      PHYSICAL EXAM:  Recent vital signs and recent I/Os reviewed by me.      Wt Readings from Last 3 Encounters:   09/25/21 203 lb 1.6 oz (92.1 kg)   09/30/15 208 lb 12.8 oz (94.7 kg)   07/17/14 195 lb (88.5 kg)     BP Readings from Last 3 Encounters:   09/26/21 (!) 138/94   09/30/15 (!) 130/100   07/17/14 122/90     Patient Vitals for the past 24 hrs:   BP Temp Temp src Pulse Resp SpO2   09/26/21 0927 -- -- -- -- 20 91 %   09/26/21 0815 (!) 138/94 97.4 °F (36.3 °C) Oral 68 20 92 %   09/26/21 0446 -- -- -- 68 18 92 %   09/26/21 0301 (!) 136/93 97.5 °F (36.4 °C) Oral 56 20 94 %   09/26/21 0145 -- -- -- -- -- 92 %   09/26/21 0005 -- -- -- -- -- 95 %   09/25/21 2305 -- -- -- -- -- 92 %   09/25/21 2254 (!) 137/95 97.7 °F (36.5 °C) Oral 67 22 91 %   09/25/21 2239 -- -- -- -- -- 90 %   09/25/21 2236 -- -- -- -- -- (!) 89 %   09/25/21 2235 -- -- -- -- -- (!) 88 %   09/25/21 2233 -- -- -- -- -- (!) 86 %   09/25/21 2232 -- -- -- -- -- (!) 86 %   09/25/21 2230 -- -- -- -- -- (!) 85 %   09/25/21 2225 -- -- -- -- -- (!) 85 %   09/25/21 2220 -- -- -- -- 24 (!) 74 %   09/25/21 2132 -- -- -- -- -- 90 %   09/25/21 2003 120/83 97.8 °F (36.6 °C) Oral 67 20 90 %   09/25/21 1635 113/77 97.5 °F (36.4 °C) Oral 72 22 92 %   09/25/21 1630 -- -- -- -- -- 92 %   09/25/21 1315 -- -- -- -- -- 91 %   09/25/21 1305 108/76 97.6 °F (36.4 °C) Oral 74 20 (!) 87 %   09/25/21 1210 112/74 97.8 °F (36.6 °C) Oral 77 20 91 %       Intake/Output Summary (Last 24 hours) at 9/26/2021 1029  Last data filed at 9/26/2021 0446  Gross per 24 hour   Intake 1798.27 ml   Output 800 ml   Net 998.27 ml          Physical exam:    In-person bedside physical examination deferred. Pursuant to the emergency declaration under the [facility], [address] [place] [address] and the [employer] Act, this clinical encounter was conducted to provide necessary medical care. (Also consistent with new provisions and guidance offered by [facility] on March 18, 2020 in setting of COVID 19 outbreak and in order to preserve personal protective equipment in accordance with the flexibilities announced by CMS on March 30, 2020)   References: https://[facility]. [facility]/Portals/0/Resources/COVID-19/3_18%20Telemed%20Guidance%20Updated%20March%2018. pdf?taf=[email or web address]                      https://[facility]. [facility]/Portals/0/Resources/COVID-19/3_18%20Telemed%20Guidance%20Updated%20March%2018. pdf?siw=[email or web address]                      http://[email or web address] pdf    However, I did visually inspect the patient and observed the following:      Vitals signs reviewed. Constitutional:       General: not in acute distress. Appearance:   ill-appearing. HENT:      Head: Normocephalic and atraumatic. Nose: Nose normal.      Mouth/Throat:      Mouth: Mucous membranes are moist.   Eyes:      General: No scleral icterus. Conjunctiva/sclera: Conjunctivae normal.   Neck:      Musculoskeletal: Normal range of motion and neck supple. Cardiovascular:      Rate and Rhythm: Normal rate. Pulmonary:      Effort: Pulmonary effort is ab-normal.   Abdominal:      General: There is no distension. Musculoskeletal:      Right lower leg: edema. Left lower leg: edema. Skin:     Coloration: Skin is not jaundiced.    Neurological:      General: deferred          DATA:  Diagnostic tests reviewed by me for today's visit:   (AS NEEDED FOR MY EVALUATION AND MANAGEMENT). Recent Labs     09/24/21 2234 09/25/21 0422 09/26/21 0426   WBC 13.6* 13.4* 11.9*   HCT 47.4 44.1 41.3    172 179     Iron Saturation:  No components found for: PERCENTFE  FERRITIN:  No results found for: FERRITIN  IRON:  No results found for: IRON  TIBC:  No results found for: TIBC    Recent Labs     09/24/21 2234 09/25/21 0422 09/25/21  1017 09/26/21 0427   * 126*  126* 128* 126*   K 3.4* 3.9  --  3.5   CL 91* 92*  --  90*   CO2 17* 17*  --  21   BUN 90* 91*  --  90*   CREATININE 2.1* 2.2*  --  2.4*     Recent Labs     09/24/21 2234 09/25/21 0422 09/26/21 0427   CALCIUM 9.0 8.6 8.4   MG  --   --  3.00*     No results for input(s): PH, PCO2, PO2 in the last 72 hours.     Invalid input(s): Ilia Quintero    ABG:  No results found for: PH, PCO2, PO2, HCO3, BE, THGB, TCO2, O2SAT  VBG:  No results found for: PHVEN, BKV6TBD, BEVEN, U0GJAEKT    LDH:    Lab Results   Component Value Date     09/25/2021     Uric Acid:  No results found for: LABURIC, URICACID    PT/INR:    Lab Results   Component Value Date    PROTIME 15.5 09/25/2021    INR 1.35 09/25/2021     Warfarin PT/INR:  No components found for: Sarapriti Mccarty  PTT:    Lab Results   Component Value Date    APTT 57.4 09/26/2021   [APTT}  Last 3 Troponin:    Lab Results   Component Value Date    TROPONINI <0.01 09/25/2021    TROPONINI <0.01 09/24/2021    TROPONINI 0.01 03/19/2010       U/A:    Lab Results   Component Value Date    COLORU YELLOW 09/24/2021    PROTEINU 100 09/24/2021    PHUR 5.5 09/24/2021    WBCUA 11 09/24/2021    RBCUA 5 09/24/2021    CLARITYU CLOUDY 09/24/2021    SPECGRAV 1.019 09/24/2021    LEUKOCYTESUR Negative 09/24/2021    UROBILINOGEN 0.2 09/24/2021    BILIRUBINUR Negative 09/24/2021    BLOODU MODERATE 09/24/2021    GLUCOSEU Negative 09/24/2021     Microalbumen/Creatinine ratio:  No components found for: RUCREAT  24 Hour Urine for Protein:  No components found for: Vergirosalie Shoemaker, Atif Richardson  24 Hour Urine for Creatinine Clearance:  No components found for: CREAT4, UHRS10, UTV10  Urine Toxicology:  No components found for: IAMMENTA, IBARBIT, IBENZO, ICOCAINE, IMARTHC, IOPIATES, IPHENCYC    HgBA1c:  No results found for: LABA1C  RPR:  No results found for: RPR  HIV:  No results found for: HIV  KYAW:  No results found for: ANATITER, KYAW  RF:  No results found for: RF  DSDNA:  No components found for: DNA  AMYLASE:  No results found for: AMYLASE  LIPASE:  No results found for: LIPASE  Fibrinogen Level:  No components found for: FIB       BELOW MENTIONED RADIOLOGY STUDY RESULTS BY ME (AS NEEDED FOR MY EVALUATION AND MANAGEMENT). XR CHEST PORTABLE    Result Date: 9/24/2021  EXAMINATION: ONE XRAY VIEW OF THE CHEST 9/24/2021 10:44 pm COMPARISON: 03/19/2010 HISTORY: ORDERING SYSTEM PROVIDED HISTORY: SOB TECHNOLOGIST PROVIDED HISTORY: Reason for exam:->SOB FINDINGS: Cardiomegaly. Mild right hilar prominence could be vascular. Right lung is grossly clear. There multifocal parenchymal opacity involving left lung worrisome for developing pneumonia versus interstitial alveolar infiltrate/edema. No effusion. No pneumothorax. Left-sided airspace disease. This is worrisome for developing pneumonia versus interstitial alveolar edema. At minimum, consider follow-up imaging to document resolution following medical treatment course.

## 2021-09-26 NOTE — PROGRESS NOTES
Pt unable to void so far this shift after multiple attempts. Pt unsuccessful in bed with urinal, standing at bedside and after ambulating to restroom on commode. Bladder scan completed and showed 835 mL. Hospitalist notified via [employer].

## 2021-09-26 NOTE — PROGRESS NOTES
Standby assistance provided to ambulate pt to restroom. Pt unable to void in urinal at bedside. Pt unable to void in restroom. Upon returning to bed SpO2 74% on 8L. Pt does not appear to be in acute distress. Breathing remains unlabored during this time. Pt reports \"a little\" shortness of breath. SpO2 recovered to 85% after deep breathing on 8L.

## 2021-09-27 ENCOUNTER — APPOINTMENT (OUTPATIENT)
Dept: ULTRASOUND IMAGING | Age: 61
DRG: 871 | End: 2021-09-27
Payer: MEDICARE

## 2021-09-27 ENCOUNTER — APPOINTMENT (OUTPATIENT)
Dept: GENERAL RADIOLOGY | Age: 61
DRG: 871 | End: 2021-09-27
Payer: MEDICARE

## 2021-09-27 LAB
A/G RATIO: 0.8 (ref 1.1–2.2)
ALBUMIN SERPL-MCNC: 3 G/DL (ref 3.4–5)
ALP BLD-CCNC: 75 U/L (ref 40–129)
ALT SERPL-CCNC: 34 U/L (ref 10–40)
ANION GAP SERPL CALCULATED.3IONS-SCNC: 14 MMOL/L (ref 3–16)
APTT: 33.3 SEC (ref 26.2–38.6)
APTT: 53.4 SEC (ref 26.2–38.6)
APTT: 72.5 SEC (ref 26.2–38.6)
AST SERPL-CCNC: 31 U/L (ref 15–37)
BASE EXCESS ARTERIAL: 3.8 MMOL/L (ref -3–3)
BASOPHILS ABSOLUTE: 0.1 K/UL (ref 0–0.2)
BASOPHILS RELATIVE PERCENT: 0.3 %
BILIRUB SERPL-MCNC: 0.3 MG/DL (ref 0–1)
BUN BLDV-MCNC: 73 MG/DL (ref 7–20)
C-REACTIVE PROTEIN: 85.3 MG/L (ref 0–5.1)
CALCIUM SERPL-MCNC: 8.7 MG/DL (ref 8.3–10.6)
CARBOXYHEMOGLOBIN ARTERIAL: 0.9 % (ref 0–1.5)
CHLORIDE BLD-SCNC: 96 MMOL/L (ref 99–110)
CO2: 22 MMOL/L (ref 21–32)
CREAT SERPL-MCNC: 1.8 MG/DL (ref 0.8–1.3)
D DIMER: 268 NG/ML DDU (ref 0–229)
EOSINOPHILS ABSOLUTE: 0 K/UL (ref 0–0.6)
EOSINOPHILS RELATIVE PERCENT: 0 %
GFR AFRICAN AMERICAN: 46
GFR NON-AFRICAN AMERICAN: 38
GLOBULIN: 3.6 G/DL
GLUCOSE BLD-MCNC: 137 MG/DL (ref 70–99)
GLUCOSE BLD-MCNC: 155 MG/DL (ref 70–99)
GLUCOSE BLD-MCNC: 160 MG/DL (ref 70–99)
GLUCOSE BLD-MCNC: 187 MG/DL (ref 70–99)
GLUCOSE BLD-MCNC: 190 MG/DL (ref 70–99)
HCO3 ARTERIAL: 27.7 MMOL/L (ref 21–29)
HCT VFR BLD CALC: 44.4 % (ref 40.5–52.5)
HEMOGLOBIN, ART, EXTENDED: 14.9 G/DL (ref 13.5–17.5)
HEMOGLOBIN: 15.2 G/DL (ref 13.5–17.5)
LYMPHOCYTES ABSOLUTE: 0.6 K/UL (ref 1–5.1)
LYMPHOCYTES RELATIVE PERCENT: 3.4 %
MCH RBC QN AUTO: 26.8 PG (ref 26–34)
MCHC RBC AUTO-ENTMCNC: 34.3 G/DL (ref 31–36)
MCV RBC AUTO: 78.3 FL (ref 80–100)
METHEMOGLOBIN ARTERIAL: 0 %
MONOCYTES ABSOLUTE: 1 K/UL (ref 0–1.3)
MONOCYTES RELATIVE PERCENT: 6 %
NEUTROPHILS ABSOLUTE: 15.6 K/UL (ref 1.7–7.7)
NEUTROPHILS RELATIVE PERCENT: 90.3 %
O2 SAT, ARTERIAL: 91 %
O2 THERAPY: ABNORMAL
PCO2 ARTERIAL: 38.4 MMHG (ref 35–45)
PDW BLD-RTO: 14.6 % (ref 12.4–15.4)
PERFORMED ON: ABNORMAL
PH ARTERIAL: 7.47 (ref 7.35–7.45)
PLATELET # BLD: 226 K/UL (ref 135–450)
PMV BLD AUTO: 9 FL (ref 5–10.5)
PO2 ARTERIAL: 56.2 MMHG (ref 75–108)
POTASSIUM REFLEX MAGNESIUM: 3.7 MMOL/L (ref 3.5–5.1)
PROCALCITONIN: 0.13 NG/ML (ref 0–0.15)
RBC # BLD: 5.68 M/UL (ref 4.2–5.9)
SODIUM BLD-SCNC: 132 MMOL/L (ref 136–145)
TCO2 ARTERIAL: 64.7 MMOL/L
TOTAL PROTEIN: 6.6 G/DL (ref 6.4–8.2)
WBC # BLD: 17.2 K/UL (ref 4–11)

## 2021-09-27 PROCEDURE — 85730 THROMBOPLASTIN TIME PARTIAL: CPT

## 2021-09-27 PROCEDURE — 2580000003 HC RX 258: Performed by: INTERNAL MEDICINE

## 2021-09-27 PROCEDURE — 85025 COMPLETE CBC W/AUTO DIFF WBC: CPT

## 2021-09-27 PROCEDURE — 80053 COMPREHEN METABOLIC PANEL: CPT

## 2021-09-27 PROCEDURE — 82803 BLOOD GASES ANY COMBINATION: CPT

## 2021-09-27 PROCEDURE — 6360000002 HC RX W HCPCS: Performed by: HOSPITALIST

## 2021-09-27 PROCEDURE — 6360000002 HC RX W HCPCS: Performed by: INTERNAL MEDICINE

## 2021-09-27 PROCEDURE — 71045 X-RAY EXAM CHEST 1 VIEW: CPT

## 2021-09-27 PROCEDURE — 84145 PROCALCITONIN (PCT): CPT

## 2021-09-27 PROCEDURE — 76770 US EXAM ABDO BACK WALL COMP: CPT

## 2021-09-27 PROCEDURE — 99223 1ST HOSP IP/OBS HIGH 75: CPT | Performed by: INTERNAL MEDICINE

## 2021-09-27 PROCEDURE — 36600 WITHDRAWAL OF ARTERIAL BLOOD: CPT

## 2021-09-27 PROCEDURE — 6370000000 HC RX 637 (ALT 250 FOR IP): Performed by: HOSPITALIST

## 2021-09-27 PROCEDURE — 85379 FIBRIN DEGRADATION QUANT: CPT

## 2021-09-27 PROCEDURE — 6370000000 HC RX 637 (ALT 250 FOR IP): Performed by: INTERNAL MEDICINE

## 2021-09-27 PROCEDURE — 36415 COLL VENOUS BLD VENIPUNCTURE: CPT

## 2021-09-27 PROCEDURE — 86140 C-REACTIVE PROTEIN: CPT

## 2021-09-27 PROCEDURE — 2500000003 HC RX 250 WO HCPCS: Performed by: INTERNAL MEDICINE

## 2021-09-27 PROCEDURE — 2060000000 HC ICU INTERMEDIATE R&B

## 2021-09-27 RX ORDER — OXYCODONE AND ACETAMINOPHEN 10; 325 MG/1; MG/1
1 TABLET ORAL EVERY 6 HOURS PRN
Status: DISCONTINUED | OUTPATIENT
Start: 2021-09-27 | End: 2021-10-02 | Stop reason: HOSPADM

## 2021-09-27 RX ADMIN — ONDANSETRON 4 MG: 2 INJECTION INTRAMUSCULAR; INTRAVENOUS at 11:40

## 2021-09-27 RX ADMIN — INSULIN LISPRO 1 UNITS: 100 INJECTION, SOLUTION INTRAVENOUS; SUBCUTANEOUS at 17:12

## 2021-09-27 RX ADMIN — Medication 10 ML: at 21:44

## 2021-09-27 RX ADMIN — MORPHINE SULFATE 2 MG: 2 INJECTION, SOLUTION INTRAMUSCULAR; INTRAVENOUS at 10:01

## 2021-09-27 RX ADMIN — OXYCODONE AND ACETAMINOPHEN 1 TABLET: 10; 325 TABLET ORAL at 13:05

## 2021-09-27 RX ADMIN — MORPHINE SULFATE 2 MG: 2 INJECTION, SOLUTION INTRAMUSCULAR; INTRAVENOUS at 14:50

## 2021-09-27 RX ADMIN — Medication 10 ML: at 10:49

## 2021-09-27 RX ADMIN — Medication 2000 UNITS: at 10:03

## 2021-09-27 RX ADMIN — INSULIN LISPRO 1 UNITS: 100 INJECTION, SOLUTION INTRAVENOUS; SUBCUTANEOUS at 21:45

## 2021-09-27 RX ADMIN — HEPARIN SODIUM 10 UNITS/KG/HR: 10000 INJECTION, SOLUTION INTRAVENOUS at 07:38

## 2021-09-27 RX ADMIN — FAMOTIDINE 20 MG: 20 TABLET, FILM COATED ORAL at 07:42

## 2021-09-27 RX ADMIN — DEXAMETHASONE SODIUM PHOSPHATE 20 MG: 4 INJECTION, SOLUTION INTRA-ARTICULAR; INTRALESIONAL; INTRAMUSCULAR; INTRAVENOUS; SOFT TISSUE at 11:42

## 2021-09-27 RX ADMIN — FAMOTIDINE 20 MG: 20 TABLET, FILM COATED ORAL at 21:44

## 2021-09-27 RX ADMIN — SODIUM CHLORIDE 25 ML: 9 INJECTION, SOLUTION INTRAVENOUS at 10:51

## 2021-09-27 RX ADMIN — SODIUM CHLORIDE 25 ML: 9 INJECTION, SOLUTION INTRAVENOUS at 11:42

## 2021-09-27 RX ADMIN — INSULIN LISPRO 1 UNITS: 100 INJECTION, SOLUTION INTRAVENOUS; SUBCUTANEOUS at 13:06

## 2021-09-27 RX ADMIN — HEPARIN SODIUM 12 UNITS/KG/HR: 10000 INJECTION, SOLUTION INTRAVENOUS at 16:05

## 2021-09-27 RX ADMIN — HEPARIN SODIUM 3630 UNITS: 1000 INJECTION INTRAVENOUS; SUBCUTANEOUS at 16:03

## 2021-09-27 RX ADMIN — MORPHINE SULFATE 2 MG: 2 INJECTION, SOLUTION INTRAMUSCULAR; INTRAVENOUS at 19:14

## 2021-09-27 RX ADMIN — REMDESIVIR 100 MG: 100 INJECTION, POWDER, LYOPHILIZED, FOR SOLUTION INTRAVENOUS at 10:51

## 2021-09-27 RX ADMIN — OXYCODONE AND ACETAMINOPHEN 1 TABLET: 10; 325 TABLET ORAL at 06:52

## 2021-09-27 RX ADMIN — OXYCODONE AND ACETAMINOPHEN 1 TABLET: 10; 325 TABLET ORAL at 21:44

## 2021-09-27 ASSESSMENT — PAIN DESCRIPTION - PAIN TYPE
TYPE: CHRONIC PAIN
TYPE: CHRONIC PAIN

## 2021-09-27 ASSESSMENT — PAIN SCALES - GENERAL
PAINLEVEL_OUTOF10: 4
PAINLEVEL_OUTOF10: 7
PAINLEVEL_OUTOF10: 2
PAINLEVEL_OUTOF10: 7
PAINLEVEL_OUTOF10: 3
PAINLEVEL_OUTOF10: 2
PAINLEVEL_OUTOF10: 6
PAINLEVEL_OUTOF10: 7
PAINLEVEL_OUTOF10: 6
PAINLEVEL_OUTOF10: 8
PAINLEVEL_OUTOF10: 3
PAINLEVEL_OUTOF10: 7

## 2021-09-27 ASSESSMENT — PAIN DESCRIPTION - LOCATION
LOCATION: BACK;NECK
LOCATION: BACK;NECK

## 2021-09-27 ASSESSMENT — PAIN DESCRIPTION - DESCRIPTORS: DESCRIPTORS: ACHING

## 2021-09-27 ASSESSMENT — PAIN DESCRIPTION - FREQUENCY: FREQUENCY: CONTINUOUS

## 2021-09-27 NOTE — PLAN OF CARE
Ongoing     Problem: Risk for Fluid Volume Deficit  Goal: Maintain normal heart rhythm  9/26/2021 2103 by Kwame Wood  Outcome: Ongoing     Problem: Risk for Fluid Volume Deficit  Goal: Maintain absence of muscle cramping  9/26/2021 2103 by Kwame Wood  Outcome: Ongoing     Problem: Risk for Fluid Volume Deficit  Goal: Maintain normal serum potassium, sodium, calcium, phosphorus, and pH  9/26/2021 2103 by Kwame Wood  Outcome: Ongoing     Problem: Loneliness or Risk for Loneliness  Goal: Demonstrate positive use of time alone when socialization is not possible  9/26/2021 2103 by Kwame Wood  Outcome: Ongoing     Problem: Fatigue  Goal: Verbalize increase energy and improved vitality  9/26/2021 2103 by Kwame Wood  Outcome: Ongoing     Problem: Patient Education: Go to Patient Education Activity  Goal: Patient/Family Education  9/26/2021 2103 by Kwame Wood  Outcome: Ongoing

## 2021-09-27 NOTE — PROGRESS NOTES
25,000 units in dextrose 5% 250 mL (premix) infusion, Continuous        Objective:  BP (!) 136/92   Pulse 84   Temp 97.8 °F (36.6 °C) (Oral)   Resp 18   Ht 5' 7.5\" (1.715 m)   Wt 203 lb 9.6 oz (92.4 kg)   SpO2 (!) 89%   BMI 31.42 kg/m²     Intake/Output Summary (Last 24 hours) at 9/27/2021 1217  Last data filed at 9/27/2021 0448  Gross per 24 hour   Intake 600 ml   Output 2090 ml   Net -1490 ml      Wt Readings from Last 3 Encounters:   09/27/21 203 lb 9.6 oz (92.4 kg)   09/30/15 208 lb 12.8 oz (94.7 kg)   07/17/14 195 lb (88.5 kg)       General appearance:  Appears comfortable  Eyes: Sclera clear. Pupils equal.  ENT: Moist oral mucosa. Trachea midline, no adenopathy. Cardiovascular: Regular rhythm, normal S1, S2. No murmur. No edema in lower extremities  Respiratory: Not using accessory muscles. Good inspiratory effort. Clear to auscultation bilaterally, no wheeze or crackles. GI: Abdomen soft, no tenderness, not distended, normal bowel sounds  Musculoskeletal: No cyanosis in digits, neck supple  Neurology: CN 2-12 grossly intact. No speech or motor deficits  Psych: Normal affect. Alert and oriented in time, place and person  Skin: Warm, dry, normal turgor    Labs and Tests:  CBC:   Recent Labs     09/25/21  0422 09/26/21  0426 09/27/21  0512   WBC 13.4* 11.9* 17.2*   HGB 15.3 14.5 15.2    179 226     BMP:    Recent Labs     09/25/21  0422 09/25/21  0422 09/25/21  1017 09/26/21  0427 09/27/21  0512   *  126*   < > 128* 126* 132*   K 3.9  --   --  3.5 3.7   CL 92*  --   --  90* 96*   CO2 17*  --   --  21 22   BUN 91*  --   --  90* 73*   CREATININE 2.2*  --   --  2.4* 1.8*   GLUCOSE 202*  --   --  173* 160*    < > = values in this interval not displayed.      Hepatic:   Recent Labs     09/25/21  0422 09/26/21  0427 09/27/21  0512   AST 45* 39* 31   ALT 24 30 34   BILITOT 0.4 <0.2 0.3   ALKPHOS 73 69 75       Discussed care with family and patient             Spent 30  minutes with patient and family at bedside and on unit reviewing medical records and labs, spent greater than 50% time counseling patient and family on diagnosis and plan   Problem List  Principal Problem:    Pneumonia due to COVID-19 virus  Active Problems:    HTN (hypertension)    GERD (gastroesophageal reflux disease)    Acute respiratory failure with hypoxia (HCC)    ASHWINI (acute kidney injury) ([facility] [address]    Hyponatremia    Anxiety    Obesity (BMI 30-39. 9)  Resolved Problems:    * No resolved hospital problems. *       Assessment & Plan:   1. Acute hypoxic respiratory failure  - secondary to COVID 19  -Patient oxygen requirements of 15 L and desats easily.  -I will repeat checks x-ray, get ABG  -We will order pulmonary consult patient already on remdesivir received Actemra  -High-dose steroids  Need close monitoring. If needs to be can always use Vapotherm-    ASHWINI  -Creatinine is better at 1.8 today. - monitor  - IVF  - nephrology following. Hyponatremia    Anion gap acidosis           Diet: ADULT DIET;  Regular  Code:Full Code  DVT PPX lovenox       Brnadan Stack MD   9/27/2021 12:17 PM

## 2021-09-27 NOTE — CONSULTS
[facility] Pulmonary and Critical Care   Consult Note      Reason for Consult: Acute hypoxemic respiratory failure, COVID-19 pneumonia  Requesting Physician: Dr. Nay Collins:   [address] [facility]:                The patient is a 57 y.o. male with significant past medical history of:      Diagnosis Date    Anxiety     GERD (gastroesophageal reflux disease)     Heartburn     Hypertension      Patient originally admitted 9/24/2021 with a chief complaint of shortness of breath and acute hypoxemic respiratory failure related to COVID-19 pneumonia. He has been treated with Decadron, remdesivir and Actemra. Despite this, acute hypoxemic respiratory failure has worsened to the point that he is now on 15 L HFNC and demonstrating marginal saturations. However, he is not particularly in respiratory distress. He has no prior pulmonary history. He does have a remote history of smoking.       Past Surgical History:        Procedure Laterality Date    HERNIA REPAIR      TESTICLE SURGERY      spermatocele     Current Medications:    Current Facility-Administered Medications: oxyCODONE-acetaminophen (PERCOCET)  MG per tablet 1 tablet, 1 tablet, Oral, Q6H PRN  polyvinyl alcohol (LIQUIFILM TEARS) 1.4 % ophthalmic solution 1 drop, 1 drop, Both Eyes, Q4H PRN  morphine (PF) injection 2 mg, 2 mg, IntraVENous, Q4H PRN  sodium chloride flush 0.9 % injection 5-40 mL, 5-40 mL, IntraVENous, 2 times per day  sodium chloride flush 0.9 % injection 5-40 mL, 5-40 mL, IntraVENous, PRN  0.9 % sodium chloride infusion, 25 mL, IntraVENous, PRN  famotidine (PEPCID) tablet 20 mg, 20 mg, Oral, BID  aluminum & magnesium hydroxide-simethicone (MAALOX) 200-200-20 MG/5ML suspension 30 mL, 30 mL, Oral, Q6H PRN  ondansetron (ZOFRAN-ODT) disintegrating tablet 4 mg, 4 mg, Oral, Q8H PRN **OR** ondansetron (ZOFRAN) injection 4 mg, 4 mg, IntraVENous, Q6H PRN  polyethylene glycol (GLYCOLAX) packet 17 g, 17 g, Oral, Daily PRN  acetaminophen (TYLENOL) tablet 650 mg, 650 mg, Oral, Q6H PRN **OR** acetaminophen (TYLENOL) suppository 650 mg, 650 mg, Rectal, Q6H PRN  guaiFENesin-dextromethorphan (ROBITUSSIN DM) 100-10 MG/5ML syrup 5 mL, 5 mL, Oral, Q4H PRN  Vitamin D (CHOLECALCIFEROL) tablet 2,000 Units, 2,000 Units, Oral, Daily  dexamethasone (DECADRON) 20 mg in sodium chloride 0.9 % IVPB, 20 mg, IntraVENous, Daily  insulin lispro (1 Unit Dial) 0-6 Units, 0-6 Units, SubCUTAneous, TID WC  insulin lispro (1 Unit Dial) 0-3 Units, 0-3 Units, SubCUTAneous, Nightly  glucose (GLUTOSE) 40 % oral gel 15 g, 15 g, Oral, PRN  dextrose 50 % IV solution, 12.5 g, IntraVENous, PRN  glucagon (rDNA) injection 1 mg, 1 mg, IntraMUSCular, PRN  dextrose 5 % solution, 100 mL/hr, IntraVENous, PRN  [COMPLETED] remdesivir 200 mg in sodium chloride 0.9 % 250 mL IVPB, 200 mg, IntraVENous, Once **FOLLOWED BY** remdesivir 100 mg in sodium chloride 0.9 % 250 mL IVPB, 100 mg, IntraVENous, Q24H  heparin (porcine) injection 7,260 Units, 80 Units/kg, IntraVENous, PRN  heparin (porcine) injection 3,630 Units, 40 Units/kg, IntraVENous, PRN  heparin 25,000 units in dextrose 5% 250 mL (premix) infusion, 5-30 Units/kg/hr, IntraVENous, Continuous    Allergies   Allergen Reactions    Codeine        Social History:    TOBACCO:   reports that he quit smoking about 8 years ago. His smoking use included cigarettes. He smoked 1.00 pack per day. He has never used smokeless tobacco.  ETOH:   reports no history of alcohol use.   Patient currently lives independently  Environmental/chemical exposure: None known    Family History:       Problem Relation Age of Onset    Heart Disease Father     High Cholesterol Father     High Blood Pressure Father     High Blood Pressure Maternal Grandfather     Stroke Maternal Grandfather     Cancer Paternal Grandfather         lung    Other Mother         colectomy due to diverticulitis     REVIEW OF SYSTEMS:    CONSTITUTIONAL:  negative for fevers, chills, diaphoresis, activity change, appetite change, fatigue, night sweats and unexpected weight change. EYES:  negative for blurred vision, eye discharge, visual disturbance and icterus  HEENT:  negative for hearing loss, tinnitus, ear drainage, sinus pressure, nasal congestion, epistaxis and snoring  RESPIRATORY:  See HPI  CARDIOVASCULAR:  negative for chest pain, palpitations, exertional chest pressure/discomfort, edema, syncope  GASTROINTESTINAL:  negative for nausea, vomiting, diarrhea, constipation, blood in stool and abdominal pain  GENITOURINARY:  negative for frequency, dysuria, urinary incontinence, decreased urine volume, and hematuria  HEMATOLOGIC/LYMPHATIC:  negative for easy bruising, bleeding and lymphadenopathy  ALLERGIC/IMMUNOLOGIC:  negative for recurrent infections, angioedema, anaphylaxis and drug reactions  ENDOCRINE:  negative for weight changes and diabetic symptoms including polyuria, polydipsia and polyphagia  MUSCULOSKELETAL:  negative for  pain, joint swelling, decreased range of motion and muscle weakness  NEUROLOGICAL:  negative for headaches, slurred speech, unilateral weakness  PSYCHIATRIC/BEHAVIORAL: negative for hallucinations, behavioral problems, confusion and agitation. Objective:   PHYSICAL EXAM:      VITALS:  BP (!) 136/92   Pulse 84   Temp 97.8 °F (36.6 °C) (Oral)   Resp 18   Ht 5' 7.5\" (1.715 m)   Wt 203 lb 9.6 oz (92.4 kg)   SpO2 (!) 89%   BMI 31.42 kg/m²      24HR INTAKE/OUTPUT:      Intake/Output Summary (Last 24 hours) at 9/27/2021 1530  Last data filed at 9/27/2021 0800  Gross per 24 hour   Intake 600 ml   Output 2515 ml   Net -1915 ml     CONSTITUTIONAL:  awake, alert, cooperative, no apparent distress, and appears stated age  NECK:  Supple, symmetrical, trachea midline, no adenopathy, thyroid symmetric, not enlarged and no tenderness, skin normal  LUNGS:  no increased work of breathing and basilar crackles to auscultation.  No accessory muscle use  CARDIOVASCULAR: S1 and S2, no edema and no JVD  ABDOMEN:  normal bowel sounds, non-distended and no masses palpated, and no tenderness to palpation. No hepatospleenomegaly  LYMPHADENOPATHY:  no axillary or supraclavicular adenopathy. No cervical adnenopathy  PSYCHIATRIC: Oriented to person place and time. No obvious depression or anxiety. MUSCULOSKELETAL: No obvious misalignment or effusion of the joints. No clubbing, cyanosis of the digits. SKIN:  normal skin color, texture, turgor and no redness, warmth, or swelling. No palpable nodules    DATA:    Old records have been reviewed    CBC:  Recent Labs     09/25/21 0422 09/26/21  0426 09/27/21  0512   WBC 13.4* 11.9* 17.2*   RBC 5.66 5.37 5.68   HGB 15.3 14.5 15.2   HCT 44.1 41.3 44.4    179 226   MCV 78.0* 76.9* 78.3*   MCH 27.1 27.0 26.8   MCHC 34.8 35.1 34.3   RDW 14.5 14.6 14.6      BMP:  Recent Labs     09/25/21  0422 09/25/21  0422 09/25/21  1017 09/26/21  0427 09/27/21  0512   *  126*   < > 128* 126* 132*   K 3.9  --   --  3.5 3.7   CL 92*  --   --  90* 96*   CO2 17*  --   --  21 22   BUN 91*  --   --  90* 73*   CREATININE 2.2*  --   --  2.4* 1.8*   CALCIUM 8.6  --   --  8.4 8.7   GLUCOSE 202*  --   --  173* 160*    < > = values in this interval not displayed. ABG:  Recent Labs     09/27/21  1302   PHART 7.467*   URX7DUZ 38.4   PO2ART 56.2*   AMT0FNS 27.7   E9QQIIOB 91.0*   BEART 3.8*     Procalcitonin  Recent Labs     09/24/21  2234 09/25/21  0422 09/27/21  0512   PROCAL 0.31* 0.34* 0.13       No results found for: BNP  Lab Results   Component Value Date    CKTOTAL 267 09/24/2021    TROPONINI <0.01 09/25/2021           Radiology Review:  All pertinent images / reports were reviewed as a part of this visit. Assessment:     1. Acute hypoxemic respiratory failure  2. COVID-19 pneumonia  3.  Hypercoagulable state      Plan:     I have reviewed laboratories, medical records and images for this visit  Chest x-ray 9/24/2021 shows multifocal airspace disease worse on the left than on the right. Covid PCR positive  Procalcitonin has been mildly elevated  Has not required antibiotic therapy  On Decadron 20 mg daily and remdesivir 100 mg daily  Also received 1 dose Actemra  CRP is downtrending. He was 267 on admission and is now 85.3. We will repeat chest x-ray  Respiratory failure has progressed to the point he is requiring 15 L/min HFNC. Saturations are marginal on this  Discussed prone/semiprone positioning with the patient  Start incentive spirometer  May need heated high flow oxygen if his saturations do not tolerate HFNC.

## 2021-09-27 NOTE — PROGRESS NOTES
Comprehensive Nutrition Assessment    Type and Reason for Visit:  Positive Nutrition Screen    Nutrition Recommendations/Plan:   Offer Ensure Enlive BID     Nutrition Assessment:  Pt is in droplet plus isolation and was unavailable to speak with RD over the phone. All information obtained from chart review. Pt triggered positive nursing nutrition screen for MST 3, indicating weight loss and poor appetite. Pt has not been feeling well over the past 3 weeks. Diarrhea noted. Inadequate nutrition likely related to Covid-19 infection. Pt on a regular diet with no PO intake documented yet. Will offer Ensure Enlive BID. Malnutrition Assessment:  Malnutrition Status:  Insufficient data      Estimated Daily Nutrient Needs:  Energy (kcal):  9818-4740; Weight Used for Energy Requirements:  Current (92 kg)     Protein (g):   grams; Weight Used for Protein Requirements:  Ideal (69 kg; 1.2-1.5 grams per kg)        Fluid (ml/day):   ; Method Used for Fluid Requirements:  1 ml/kcal      Nutrition Related Findings:  No edema noted      Wounds:  None       Current Nutrition Therapies:    ADULT DIET; Regular    Anthropometric Measures:  · Height: 5' 7.5\" (171.5 cm)  · Current Body Weight: 203 lb 11.3 oz (92.4 kg)   · Ideal Body Weight: 151 lbs; % Ideal Body Weight 134.9 %   · BMI: 31.4  · BMI Categories: Obese Class 1 (BMI 30.0-34. 9)       Nutrition Diagnosis:   · Inadequate protein-energy intake related to inadequate protein-energy intake as evidenced by poor intake prior to admission      Nutrition Interventions:   Food and/or Nutrient Delivery:  Continue Current Diet, Start Oral Nutrition Supplement  Nutrition Education/Counseling:  Education not indicated   Coordination of Nutrition Care:  Continue to monitor while inpatient    Goals:  Pt will consume at least 50% of meals and supplements       Nutrition Monitoring and Evaluation:   Behavioral-Environmental Outcomes:  None Identified   Food/Nutrient Intake Outcomes: Food and Nutrient Intake, Supplement Intake  Physical Signs/Symptoms Outcomes:  Diarrhea, Biochemical Data     Discharge Planning:     Too soon to determine     Electronically signed by Cande Means RD, LD on 9/27/21 at 2:45 PM EDT    Contact: [phone]

## 2021-09-28 LAB
A/G RATIO: 1 (ref 1.1–2.2)
ALBUMIN SERPL-MCNC: 3.2 G/DL (ref 3.4–5)
ALP BLD-CCNC: 85 U/L (ref 40–129)
ALT SERPL-CCNC: 91 U/L (ref 10–40)
ANION GAP SERPL CALCULATED.3IONS-SCNC: 12 MMOL/L (ref 3–16)
ANISOCYTOSIS: ABNORMAL
APTT: 64.2 SEC (ref 26.2–38.6)
APTT: 85 SEC (ref 26.2–38.6)
APTT: 96.6 SEC (ref 26.2–38.6)
AST SERPL-CCNC: 74 U/L (ref 15–37)
BANDED NEUTROPHILS RELATIVE PERCENT: 2 % (ref 0–7)
BASOPHILS ABSOLUTE: 0 K/UL (ref 0–0.2)
BASOPHILS RELATIVE PERCENT: 0 %
BILIRUB SERPL-MCNC: 0.4 MG/DL (ref 0–1)
BLOOD CULTURE, ROUTINE: NORMAL
BUN BLDV-MCNC: 50 MG/DL (ref 7–20)
BURR CELLS: ABNORMAL
CALCIUM SERPL-MCNC: 8.6 MG/DL (ref 8.3–10.6)
CHLORIDE BLD-SCNC: 96 MMOL/L (ref 99–110)
CO2: 26 MMOL/L (ref 21–32)
CREAT SERPL-MCNC: 1.4 MG/DL (ref 0.8–1.3)
EOSINOPHILS ABSOLUTE: 0 K/UL (ref 0–0.6)
EOSINOPHILS RELATIVE PERCENT: 0 %
GFR AFRICAN AMERICAN: >60
GFR NON-AFRICAN AMERICAN: 51
GLOBULIN: 3.1 G/DL
GLUCOSE BLD-MCNC: 132 MG/DL (ref 70–99)
GLUCOSE BLD-MCNC: 160 MG/DL (ref 70–99)
GLUCOSE BLD-MCNC: 174 MG/DL (ref 70–99)
GLUCOSE BLD-MCNC: 183 MG/DL (ref 70–99)
GLUCOSE BLD-MCNC: 207 MG/DL (ref 70–99)
HCT VFR BLD CALC: 44.2 % (ref 40.5–52.5)
HEMOGLOBIN: 15 G/DL (ref 13.5–17.5)
LYMPHOCYTES ABSOLUTE: 1.7 K/UL (ref 1–5.1)
LYMPHOCYTES RELATIVE PERCENT: 9 %
MCH RBC QN AUTO: 27.1 PG (ref 26–34)
MCHC RBC AUTO-ENTMCNC: 33.9 G/DL (ref 31–36)
MCV RBC AUTO: 79.8 FL (ref 80–100)
MONOCYTES ABSOLUTE: 1.7 K/UL (ref 0–1.3)
MONOCYTES RELATIVE PERCENT: 9 %
NEUTROPHILS ABSOLUTE: 15.2 K/UL (ref 1.7–7.7)
NEUTROPHILS RELATIVE PERCENT: 80 %
PDW BLD-RTO: 14.5 % (ref 12.4–15.4)
PERFORMED ON: ABNORMAL
PLATELET # BLD: 269 K/UL (ref 135–450)
PLATELET SLIDE REVIEW: ADEQUATE
PMV BLD AUTO: 8.8 FL (ref 5–10.5)
POTASSIUM REFLEX MAGNESIUM: 4.3 MMOL/L (ref 3.5–5.1)
RBC # BLD: 5.54 M/UL (ref 4.2–5.9)
SLIDE REVIEW: ABNORMAL
SODIUM BLD-SCNC: 134 MMOL/L (ref 136–145)
TOTAL PROTEIN: 6.3 G/DL (ref 6.4–8.2)
WBC # BLD: 18.5 K/UL (ref 4–11)

## 2021-09-28 PROCEDURE — 2700000000 HC OXYGEN THERAPY PER DAY

## 2021-09-28 PROCEDURE — 6370000000 HC RX 637 (ALT 250 FOR IP): Performed by: HOSPITALIST

## 2021-09-28 PROCEDURE — 6360000002 HC RX W HCPCS: Performed by: HOSPITALIST

## 2021-09-28 PROCEDURE — 6370000000 HC RX 637 (ALT 250 FOR IP): Performed by: INTERNAL MEDICINE

## 2021-09-28 PROCEDURE — 6360000002 HC RX W HCPCS: Performed by: INTERNAL MEDICINE

## 2021-09-28 PROCEDURE — 2580000003 HC RX 258: Performed by: INTERNAL MEDICINE

## 2021-09-28 PROCEDURE — 2500000003 HC RX 250 WO HCPCS: Performed by: INTERNAL MEDICINE

## 2021-09-28 PROCEDURE — 94761 N-INVAS EAR/PLS OXIMETRY MLT: CPT

## 2021-09-28 PROCEDURE — 85730 THROMBOPLASTIN TIME PARTIAL: CPT

## 2021-09-28 PROCEDURE — 85025 COMPLETE CBC W/AUTO DIFF WBC: CPT

## 2021-09-28 PROCEDURE — 99233 SBSQ HOSP IP/OBS HIGH 50: CPT | Performed by: INTERNAL MEDICINE

## 2021-09-28 PROCEDURE — 80053 COMPREHEN METABOLIC PANEL: CPT

## 2021-09-28 PROCEDURE — 36415 COLL VENOUS BLD VENIPUNCTURE: CPT

## 2021-09-28 PROCEDURE — 2060000000 HC ICU INTERMEDIATE R&B

## 2021-09-28 RX ORDER — HYDRALAZINE HYDROCHLORIDE 20 MG/ML
10 INJECTION INTRAMUSCULAR; INTRAVENOUS EVERY 6 HOURS PRN
Status: DISCONTINUED | OUTPATIENT
Start: 2021-09-28 | End: 2021-10-02 | Stop reason: HOSPADM

## 2021-09-28 RX ORDER — FUROSEMIDE 10 MG/ML
20 INJECTION INTRAMUSCULAR; INTRAVENOUS 2 TIMES DAILY
Status: DISCONTINUED | OUTPATIENT
Start: 2021-09-28 | End: 2021-10-02 | Stop reason: HOSPADM

## 2021-09-28 RX ADMIN — FUROSEMIDE 20 MG: 10 INJECTION, SOLUTION INTRAMUSCULAR; INTRAVENOUS at 15:09

## 2021-09-28 RX ADMIN — MORPHINE SULFATE 2 MG: 2 INJECTION, SOLUTION INTRAMUSCULAR; INTRAVENOUS at 09:09

## 2021-09-28 RX ADMIN — ONDANSETRON 4 MG: 4 TABLET, ORALLY DISINTEGRATING ORAL at 10:29

## 2021-09-28 RX ADMIN — Medication 10 ML: at 20:50

## 2021-09-28 RX ADMIN — DEXAMETHASONE SODIUM PHOSPHATE 20 MG: 4 INJECTION, SOLUTION INTRA-ARTICULAR; INTRALESIONAL; INTRAMUSCULAR; INTRAVENOUS; SOFT TISSUE at 09:09

## 2021-09-28 RX ADMIN — HYDRALAZINE HYDROCHLORIDE 10 MG: 20 INJECTION INTRAMUSCULAR; INTRAVENOUS at 20:46

## 2021-09-28 RX ADMIN — SODIUM CHLORIDE 25 ML: 9 INJECTION, SOLUTION INTRAVENOUS at 09:05

## 2021-09-28 RX ADMIN — ACETAMINOPHEN 650 MG: 325 TABLET ORAL at 09:13

## 2021-09-28 RX ADMIN — SODIUM CHLORIDE 25 ML: 9 INJECTION, SOLUTION INTRAVENOUS at 10:31

## 2021-09-28 RX ADMIN — FAMOTIDINE 20 MG: 20 TABLET, FILM COATED ORAL at 20:47

## 2021-09-28 RX ADMIN — OXYCODONE AND ACETAMINOPHEN 1 TABLET: 10; 325 TABLET ORAL at 22:09

## 2021-09-28 RX ADMIN — Medication 10 ML: at 09:15

## 2021-09-28 RX ADMIN — INSULIN LISPRO 1 UNITS: 100 INJECTION, SOLUTION INTRAVENOUS; SUBCUTANEOUS at 18:18

## 2021-09-28 RX ADMIN — INSULIN LISPRO 1 UNITS: 100 INJECTION, SOLUTION INTRAVENOUS; SUBCUTANEOUS at 20:47

## 2021-09-28 RX ADMIN — FAMOTIDINE 20 MG: 20 TABLET, FILM COATED ORAL at 06:37

## 2021-09-28 RX ADMIN — OXYCODONE AND ACETAMINOPHEN 1 TABLET: 10; 325 TABLET ORAL at 12:56

## 2021-09-28 RX ADMIN — OXYCODONE AND ACETAMINOPHEN 1 TABLET: 10; 325 TABLET ORAL at 06:36

## 2021-09-28 RX ADMIN — REMDESIVIR 100 MG: 100 INJECTION, POWDER, LYOPHILIZED, FOR SOLUTION INTRAVENOUS at 10:33

## 2021-09-28 RX ADMIN — INSULIN LISPRO 1 UNITS: 100 INJECTION, SOLUTION INTRAVENOUS; SUBCUTANEOUS at 13:02

## 2021-09-28 RX ADMIN — Medication 2000 UNITS: at 09:13

## 2021-09-28 RX ADMIN — MORPHINE SULFATE 2 MG: 2 INJECTION, SOLUTION INTRAMUSCULAR; INTRAVENOUS at 20:45

## 2021-09-28 ASSESSMENT — PAIN SCALES - GENERAL
PAINLEVEL_OUTOF10: 8
PAINLEVEL_OUTOF10: 0
PAINLEVEL_OUTOF10: 0
PAINLEVEL_OUTOF10: 8
PAINLEVEL_OUTOF10: 9
PAINLEVEL_OUTOF10: 8
PAINLEVEL_OUTOF10: 7
PAINLEVEL_OUTOF10: 5
PAINLEVEL_OUTOF10: 8
PAINLEVEL_OUTOF10: 0
PAINLEVEL_OUTOF10: 0

## 2021-09-28 ASSESSMENT — PAIN DESCRIPTION - LOCATION
LOCATION: OTHER (COMMENT)
LOCATION: ABDOMEN
LOCATION: BACK;GROIN
LOCATION: OTHER (COMMENT)

## 2021-09-28 ASSESSMENT — PAIN DESCRIPTION - ORIENTATION: ORIENTATION: LEFT;LOWER

## 2021-09-28 ASSESSMENT — PAIN DESCRIPTION - PAIN TYPE
TYPE: CHRONIC PAIN
TYPE: CHRONIC PAIN

## 2021-09-28 NOTE — PLAN OF CARE
Problem: Falls - Risk of:  Goal: Will remain free from falls  9/27/2021 2003 by Franki Alvarado  Outcome: Ongoing     Problem: Falls - Risk of:  Goal: Absence of physical injury  9/27/2021 2003 by Franki Alvarado  Outcome: Ongoing     Problem: Pain:  Goal: Pain level will decrease  9/27/2021 2003 by Franki Alvarado  Outcome: Ongoing     Problem: Pain:  Goal: Control of acute pain  9/27/2021 2003 by Franki Alvarado  Outcome: Ongoing     Problem: Pain:  Goal: Control of chronic pain  9/27/2021 2003 by Franki Alvarado  Outcome: Ongoing     Problem: Airway Clearance - Ineffective  Goal: Achieve or maintain patent airway  9/27/2021 2003 by Franki Alvarado  Outcome: Ongoing     Problem: Gas Exchange - Impaired  Goal: Absence of hypoxia  9/27/2021 2003 by Franki Alvarado  Outcome: Ongoing     Problem: Gas Exchange - Impaired  Goal: Promote optimal lung function  9/27/2021 2003 by Franki Alvarado  Outcome: Ongoing     Problem: Breathing Pattern - Ineffective  Goal: Ability to achieve and maintain a regular respiratory rate  9/27/2021 2003 by Franki Alvarado  Outcome: Ongoing     Problem: Body Temperature -  Risk of, Imbalanced  Goal: Ability to maintain a body temperature within defined limits  9/27/2021 2003 by Franki Alvarado  Outcome: Ongoing     Problem: Body Temperature -  Risk of, Imbalanced  Goal: Will regain or maintain usual level of consciousness  9/27/2021 2003 by Franki Alvarado  Outcome: Ongoing     Problem:  Body Temperature -  Risk of, Imbalanced  Goal: Complications related to the disease process, condition or treatment will be avoided or minimized  9/27/2021 2003 by Franki Alvarado  Outcome: Ongoing     Problem: Isolation Precautions - Risk of Spread of Infection  Goal: Prevent transmission of infection  9/27/2021 2003 by Franki Alvarado  Outcome: Ongoing     Problem: Nutrition Deficits  Goal: Optimize nutritional status  9/27/2021 2003 by Franki Alvarado  Outcome: Ongoing     Problem: Risk for Fluid Volume Deficit  Goal: Maintain normal heart rhythm  9/27/2021 2003 by Priscila Henson  Outcome: Ongoing     Problem: Risk for Fluid Volume Deficit  Goal: Maintain absence of muscle cramping  9/27/2021 2003 by Priscila Henson  Outcome: Ongoing     Problem: Risk for Fluid Volume Deficit  Goal: Maintain normal serum potassium, sodium, calcium, phosphorus, and pH  9/27/2021 2003 by Priscila Henson  Outcome: Ongoing     Problem: Loneliness or Risk for Loneliness  Goal: Demonstrate positive use of time alone when socialization is not possible  9/27/2021 2003 by Priscila Henson  Outcome: Ongoing     Problem: Fatigue  Goal: Verbalize increase energy and improved vitality  9/27/2021 2003 by Prisclia Henson  Outcome: Ongoing     Problem: Patient Education: Go to Patient Education Activity  Goal: Patient/Family Education  9/27/2021 2003 by Priscila Henson  Outcome: Ongoing     Problem: Nutrition  Goal: Optimal nutrition therapy  9/27/2021 2003 by Priscila Henson  Outcome: Ongoing

## 2021-09-28 NOTE — PROGRESS NOTES
MD Cristian Schaeffer MD Rexene Sails, MD               Office: [phone]                      Fax: [phone]             0 [facility]                   NEPHROLOGY INPATIENT PROGRESS NOTE:     PATIENT NAME: Shayne Reyes  : 1960  MRN: [id number]        RECOMMENDATIONS:   COVID-19 management: ongoing   - Rx: Lasix IV IBD  -ok for Actemra - no renal dosing   -ok for Ramdesivir : short course: 3-5 days. 100 mg   -IV Decadron  -Convalescent plasma not given  -A/C per protocol - to prevent renal microthrombi too   -Pneumonia antibiotic coverage, might need as elevated pro-calcitonin  -Hypoxia monitoring - severe needing HFNC - at risk for intubation     - hold home Lisinopril   - monitor PVR w/ bladder scan for jarvis insertion need. - no need for dialysis , but at higher risk for decompensation, needing closer monitoring. D/C plan from renal stand point:  - expect 3-4 days, w/ ASHWINI, COVID hypoxia    D/W primary team - hospitalist  - Dr Anders Langston:       Admitted for:  ASHWINI (acute kidney injury) ([facility] [N17.9]  Acute respiratory failure with hypoxemia ([facility] [J96.01]  Community acquired pneumonia of left lung, unspecified part of lung [J18.9]  Pneumonia due to COVID-19 virus [U07.1, J12.82]      ASHWINI (on non -CKD: likely stage 2 ):    - BL Scr- 0.9 as off 2021 ---> 2.1 on admission  -: Etiology of ASHWINI - presumed clinically ATN   - other differentials: ?covid related nephropathy, unlikely GN / TI / TMA process   - UA : reviewed = fine cast = ATN   - Renal US - Mildly increased renal cortical echogenicity but the cortical thickness is   maintained.  No shadowing calculi or hydronephrosis. .     Associated problems:   Hyponatremia - moderate - d/to ASHWINI   Hypokalemia   Acidosis : high -AGMA , d/to ASHWINI, LA WNL    Other major problems: Management per primary and other consulting teams.    //   Acute Hypoxia respiratory failure- needing NIPPV   COVID (+) PNA      Hospital Problems         Last Modified POA    * (Principal) Pneumonia due to COVID-19 virus 9/25/2021 Yes    HTN (hypertension) 9/25/2021 Yes    GERD (gastroesophageal reflux disease) 9/25/2021 Yes    Acute respiratory failure with hypoxia ([facility] 9/25/2021 Yes    ASHWINI (acute kidney injury) ([facility] [facility] 9/25/2021 Yes    Hyponatremia 9/25/2021 Yes    Anxiety 9/25/2021 Yes    Obesity (BMI 30-39.9) 9/25/2021 Yes        : other supportive care :   - Check daily renal function panel with electrolytes-phosphorus  - Strict monitoring of I/Os, daily weight  - Renal feeds/diet  - Current medications reviewed. - Nephrotoxic medications have been discontinued. - Dose adjusted and appropriate. - Dose meds for eGFR <15 mL/min/1.73m2 during ASHWINI    - Avoid heavy opioids due to renal failure - may use very low dose dilaudid / fentanyl with close monitoring of CNS and respiratory depression. Please refer to the orders. High Complexity. Multiple complex problems. Discussed with patient 's treatment team   Time spent > 30 (~35) minutes. Thank you for allowing me to participate in this patient's care. Please do not hesitate to contact me anytime. We will follow along with you. Julianna Murrell MD,  [facility] [address] [place] [address]: [phone] or Via [email or web address]  Fax: [phone]        ========================================================   ========================================================      This patient is COVID-19 positive, so in a strict isolation, as per current protocol; So in order to preserve PPE supply and to avoid unnecessary exposure to prevent transmission of COVID-19; this patient was NOT examined face to face, as risk of contact outweighs the benefits and likely would not change much of my clinical management.    HPI, review of system and physical exam was limited, as it was mainly obtained from chart review and the primary team. But all relevant records and diagnostic tests were reviewed, including laboratory and imaging results. Patient's care is being coordinated with the primary service. Subjective:  Seen via doors  COVID (+)  Hypoxia needing NIPPV , worsening   BP better  Past medical, Surgical, Social, Family medical history reviewed by me. MEDICATIONS: reviewed by me. Medications Prior to Admission:  No current facility-administered medications on file prior to encounter. Current Outpatient Medications on File Prior to Encounter   Medication Sig Dispense Refill    oxyCODONE-acetaminophen (PERCOCET)  MG per tablet Take 1 tablet by mouth every 8 hours as needed for Pain.  lisinopril (PRINIVIL;ZESTRIL) 20 MG tablet TAKE 1 TABLET BY MOUTH DAILY 30 tablet 1    omeprazole (PRILOSEC) 20 MG capsule Take 20 mg by mouth daily      HYDROcodone-acetaminophen (NORCO) 5-325 MG per tablet Take 1 tablet by mouth 2 times daily      fluticasone (FLONASE) 50 MCG/ACT nasal spray 2 sprays by Nasal route daily      baclofen (LIORESAL) 10 MG tablet Take 10 mg by mouth 2 times daily as needed      DULoxetine (CYMBALTA) 30 MG capsule Take 1 capsule by mouth daily 30 capsule 3    ALPRAZolam (XANAX) 0.5 MG tablet Take 1 tablet by mouth 2 times daily as needed for Sleep 60 tablet 0    Melatonin 5 MG TABS tablet Take 1 tablet by mouth nightly as needed.            Current Facility-Administered Medications:     oxyCODONE-acetaminophen (PERCOCET)  MG per tablet 1 tablet, 1 tablet, Oral, Q6H PRN, Percy Shook MD, 1 tablet at 09/28/21 0636    polyvinyl alcohol (LIQUIFILM TEARS) 1.4 % ophthalmic solution 1 drop, 1 drop, Both Eyes, Q4H PRN, Genna High MD, 1 drop at 09/26/21 2021    morphine (PF) injection 2 mg, 2 mg, IntraVENous, Q4H PRN, Percy Shook MD, 2 mg at 09/28/21 0909    sodium chloride flush 0.9 % injection 5-40 mL, 5-40 mL, IntraVENous, 2 times per day, Jose Monge MD, 10 mL at 09/28/21 0915    sodium chloride flush 0.9 % injection 5-40 mL, 5-40 mL, IntraVENous, PRN, Leslie Orr MD    0.9 % sodium chloride infusion, 25 mL, IntraVENous, PRN, Leslie Orr MD, Last Rate: 100 mL/hr at 09/28/21 0905, 25 mL at 09/28/21 0905    famotidine (PEPCID) tablet 20 mg, 20 mg, Oral, BID, Leslie Orr MD, 20 mg at 09/28/21 8190    aluminum & magnesium hydroxide-simethicone (MAALOX) 200-200-20 MG/5ML suspension 30 mL, 30 mL, Oral, Q6H PRN, Leslie Orr MD    ondansetron (ZOFRAN-ODT) disintegrating tablet 4 mg, 4 mg, Oral, Q8H PRN **OR** ondansetron (ZOFRAN) injection 4 mg, 4 mg, IntraVENous, Q6H PRN, Leslie Orr MD, 4 mg at 09/27/21 1140    polyethylene glycol (GLYCOLAX) packet 17 g, 17 g, Oral, Daily PRN, Leslie Orr MD    acetaminophen (TYLENOL) tablet 650 mg, 650 mg, Oral, Q6H PRN, 650 mg at 09/28/21 0913 **OR** acetaminophen (TYLENOL) suppository 650 mg, 650 mg, Rectal, Q6H PRN, Leslie Orr MD    guaiFENesin-dextromethorphan (ROBITUSSIN DM) 100-10 MG/5ML syrup 5 mL, 5 mL, Oral, Q4H PRN, Leslie Orr MD    Vitamin D (CHOLECALCIFEROL) tablet 2,000 Units, 2,000 Units, Oral, Daily, Leslie Orr MD, 2,000 Units at 09/28/21 0913    dexamethasone (DECADRON) 20 mg in sodium chloride 0.9 % IVPB, 20 mg, IntraVENous, Daily, Jaleesa [place] MD, Last Rate: 100 mL/hr at 09/28/21 0909, 20 mg at 09/28/21 0909    insulin lispro (1 Unit Dial) 0-6 Units, 0-6 Units, SubCUTAneous, TID WC, Regine Covington MD, 1 Units at 09/27/21 1712    insulin lispro (1 Unit Dial) 0-3 Units, 0-3 Units, SubCUTAneous, Nightly, Regine Covington MD, 1 Units at 09/27/21 6299    glucose (GLUTOSE) 40 % oral gel 15 g, 15 g, Oral, PRN, Rock Facundo MD    dextrose 50 % IV solution, 12.5 g, IntraVENous, PRN, Rock Facundo MD    glucagon (rDNA) injection 1 mg, 1 mg, IntraMUSCular, PRN, Rock Facundo MD    dextrose 5 % solution, 100 mL/hr, IntraVENous, PRN, Regine Covington MD    [COMPLETED] remdesivir 200 mg in sodium chloride 0.9 % 250 mL IVPB, 200 mg, IntraVENous, Once, Stopped at 09/25/21 2332 **FOLLOWED BY** remdesivir 100 mg in sodium chloride 0.9 % 250 mL IVPB, 100 mg, IntraVENous, Q24H, Florin Grant MD, Stopped at 09/27/21 1135    heparin (porcine) injection 7,260 Units, 80 Units/kg, IntraVENous, PRN, Mellissa Andrea MD    heparin (porcine) injection 3,630 Units, 40 Units/kg, IntraVENous, PRN, Mellissa Andrea MD, 3,630 Units at 09/27/21 1603    heparin 25,000 units in dextrose 5% 250 mL (premix) infusion, 5-30 Units/kg/hr, IntraVENous, Continuous, Terry Spann MD, Last Rate: 10 mL/hr at 09/28/21 0619, 11 Units/kg/hr at 09/28/21 0619    REVIEW OF SYSTEMS  Review of systems not obtained due to patient factors      PHYSICAL EXAM:  Recent vital signs and recent I/Os reviewed by me. Wt Readings from Last 3 Encounters:   09/27/21 203 lb 9.6 oz (92.4 kg)   09/30/15 208 lb 12.8 oz (94.7 kg)   07/17/14 195 lb (88.5 kg)     BP Readings from Last 3 Encounters:   09/28/21 (!) 137/91   09/30/15 (!) 130/100   07/17/14 122/90     Patient Vitals for the past 24 hrs:   BP Temp Temp src Pulse Resp SpO2 Height   09/28/21 0859 (!) 137/91 97.7 °F (36.5 °C) Oral 73 20 91 % --   09/28/21 0616 (!) 158/94 97.8 °F (36.6 °C) Oral 75 -- 95 % --   09/28/21 0443 -- -- -- -- -- 91 % --   09/28/21 0355 -- -- -- -- -- 92 % --   09/28/21 0351 -- -- -- -- -- (!) 86 % --   09/28/21 0102 (!) 148/94 97.5 °F (36.4 °C) Oral 74 20 90 % --   09/27/21 2144 (!) 138/90 -- -- 82 20 91 % --   09/27/21 2000 (!) 136/92 97.3 °F (36.3 °C) Oral 82 16 92 % --   09/27/21 1700 124/85 97.6 °F (36.4 °C) Oral 73 18 (!) 89 % --   09/27/21 1447 -- -- -- -- -- -- 5' 7.5\" (1.715 m)       Intake/Output Summary (Last 24 hours) at 9/28/2021 1020  Last data filed at 9/28/2021 6904  Gross per 24 hour   Intake 240 ml   Output 3050 ml   Net -2810 ml          Physical exam:    In-person bedside physical examination deferred.   Pursuant to the emergency declaration under the [address] Emergencies Act, 1135 waiver authority and the Coronavirus Preparedness and Response Supplemental Appropriations Act, this clinical encounter was conducted to provide necessary medical care. (Also consistent with new provisions and guidance offered by [facility] on March 18, 2020 in setting of COVID 19 outbreak and in order to preserve personal protective equipment in accordance with the flexibilities announced by CMS on March 30, 2020)   References: [email or web address]://[facility]/Portals/0/Resources/COVID-19/3_18%20Telemed%20Guidance%20Updated%20March%2018. pdf?[email or web address]                      https://[facility]/Portals/0/Resources/COVID-19/3_18%20Telemed%20Guidance%20Updated%20March%2018. pdf?dkq=[email or web address]                      [email or web address] pdf    However, I did visually inspect the patient and observed the following:      Vitals signs reviewed. Constitutional:       General: not in acute distress. Appearance:   ill-appearing. HENT:      Head: Normocephalic and atraumatic. Nose: Nose normal.      Mouth/Throat:      Mouth: Mucous membranes are moist.   Eyes:      General: No scleral icterus. Conjunctiva/sclera: Conjunctivae normal.   Neck:      Musculoskeletal: Normal range of motion and neck supple. Cardiovascular:      Rate and Rhythm: Normal rate. Pulmonary:      Effort: Pulmonary effort is ab-normal. On HFNC  Abdominal:      General: There is no distension. Musculoskeletal:      Right lower leg: edema. Left lower leg: edema. Skin:     Coloration: Skin is not jaundiced. Neurological:      General: deferred          DATA:  Diagnostic tests reviewed by me for today's visit:   (AS NEEDED FOR MY EVALUATION AND MANAGEMENT).        Recent Labs     09/26/21  0426 09/27/21  0512 09/28/21  0449   WBC 11.9* 17.2* 18.5*   HCT 41.3 44.4 44.2    226 269     Iron Saturation:  No components found for: PERCENTFE  FERRITIN:    Lab Results   Component Value Date    FERRITIN 3,230.0 09/25/2021     IRON:  No results found for: IRON  TIBC:  No results found for: TIBC    Recent Labs     09/26/21 0427 09/27/21  0512 09/28/21 0449   * 132* 134*   K 3.5 3.7 4.3   CL 90* 96* 96*   CO2 21 22 26   BUN 90* 73* 50*   CREATININE 2.4* 1.8* 1.4*     Recent Labs     09/26/21 0427 09/27/21 0512 09/28/21 0449   CALCIUM 8.4 8.7 8.6   MG 3.00*  --   --      No results for input(s): PH, PCO2, PO2 in the last 72 hours.     Invalid input(s): Lizandro Iniguez    ABG:  No results found for: PH, PCO2, PO2, HCO3, BE, THGB, TCO2, O2SAT  VBG:  No results found for: PHVEN, QHB3VFG, BEVEN, O4XFGZNG    LDH:    Lab Results   Component Value Date     09/25/2021     Uric Acid:  No results found for: LABURIC, URICACID    PT/INR:    Lab Results   Component Value Date    PROTIME 15.5 09/25/2021    INR 1.35 09/25/2021     Warfarin PT/INR:  No components found for: Brennan Naylor  PTT:    Lab Results   Component Value Date    APTT 85.0 09/28/2021   [APTT}  Last 3 Troponin:    Lab Results   Component Value Date    TROPONINI <0.01 09/25/2021    TROPONINI <0.01 09/24/2021    TROPONINI 0.01 03/19/2010       U/A:    Lab Results   Component Value Date    COLORU YELLOW 09/24/2021    PROTEINU 100 09/24/2021    PHUR 5.5 09/24/2021    WBCUA 11 09/24/2021    RBCUA 5 09/24/2021    CLARITYU CLOUDY 09/24/2021    SPECGRAV 1.019 09/24/2021    LEUKOCYTESUR Negative 09/24/2021    UROBILINOGEN 0.2 09/24/2021    BILIRUBINUR Negative 09/24/2021    BLOODU MODERATE 09/24/2021    GLUCOSEU Negative 09/24/2021     Microalbumen/Creatinine ratio:  No components found for: RUCREAT  24 Hour Urine for Protein:  No components found for: RAWUPRO, UHRS3, YVUU11ES, UTV3  24 Hour Urine for Creatinine Clearance:  No components found for: CREAT4, UHRS10, UTV10  Urine Toxicology:  No components found for: IAMMENTA, IBARBIT, IBENZO, ICOCAINE, IMARTHC, IOPIATES, IPHENCYC    HgBA1c:  No results found for: LABA1C  RPR:  No results found for: RPR  HIV:  No results found for: HIV  KYAW:  No results found for: ANATITER, KYAW  RF:  No results found for: RF  DSDNA:  No components found for: DNA  AMYLASE:  No results found for: AMYLASE  LIPASE:  No results found for: LIPASE  Fibrinogen Level:  No components found for: FIB       BELOW MENTIONED RADIOLOGY STUDY RESULTS BY ME (AS NEEDED FOR MY EVALUATION AND MANAGEMENT). XR CHEST PORTABLE    Result Date: 9/24/2021  EXAMINATION: ONE XRAY VIEW OF THE CHEST 9/24/2021 10:44 pm COMPARISON: 03/19/2010 HISTORY: ORDERING SYSTEM PROVIDED HISTORY: SOB TECHNOLOGIST PROVIDED HISTORY: Reason for exam:->SOB FINDINGS: Cardiomegaly. Mild right hilar prominence could be vascular. Right lung is grossly clear. There multifocal parenchymal opacity involving left lung worrisome for developing pneumonia versus interstitial alveolar infiltrate/edema. No effusion. No pneumothorax. Left-sided airspace disease. This is worrisome for developing pneumonia versus interstitial alveolar edema. At minimum, consider follow-up imaging to document resolution following medical treatment course. US RENAL COMPLETE [[id number]] Collected: 09/27/21 2223      Order Status: Completed Updated: 09/27/21 2228     Narrative:       EXAMINATION:   RETROPERITONEAL ULTRASOUND OF THE KIDNEYS AND URINARY BLADDER     9/27/2021     COMPARISON:   None     HISTORY:   ORDERING SYSTEM PROVIDED HISTORY: ASHWINI   TECHNOLOGIST PROVIDED HISTORY:     Reason for exam:->ASHWINI     FINDINGS:     Kidneys: The right kidney measures 10 cm in length and the left kidney measures 10.8   cm in length. Kidneys demonstrate mildly increased cortical echogenicity exaggerating the   corticomedullary differentiation.  Cortical thickness is maintained. [place]   evidence of hydronephrosis or intrarenal stones. Bladder:     Urinary bladder is collapsed with a catheter present. .      Impression:       Mildly increased renal cortical echogenicity but the cortical thickness is   maintained.  No shadowing calculi or hydronephrosis. Nata Fanning

## 2021-09-28 NOTE — PROGRESS NOTES
SpO2 alarming 86% on 15L while pt resting in bed. No acute distress, respirations remain unlabored and pt denies any worsening SOB. Pt unable to bring SpO2 above 86% with deep breathing exercises. Pt declines offer to assist into prone positioning. NRB mask @ 15L placed on pt over top of 15L high flow cannula. SpO2 quickly >90%. RT notified of this and will bring heated high flow cannula when available.

## 2021-09-28 NOTE — PROGRESS NOTES
Call received from lab to send stool specimen for occult stool test. Per chart review it appears Cdiff specimen was collected on 9/25 15:55 and sent. Results for Cdiff are in lab results but no results for occult stool. Lab unable to locate specimen at this time. Will attempt to collect and send next stool sample.

## 2021-09-28 NOTE — PROGRESS NOTES
Pt transferred to room 3375 for negative pressure room. Pt remains on heated high flow with cont. Pulse ox remains in place with alarms for SpO2 <89%. Pt requesting to have pepcid early along with pain pill for chronic back pain. Will medicate per MAR.

## 2021-09-28 NOTE — CONSULTS
[facility] Pulmonary and Critical Care   Consult Note      Reason for Consult: Acute hypoxemic respiratory failure, COVID-19 pneumonia  Requesting Physician: Dr. Javan Rod:   [address] [facility]:                The patient is a 57 y.o. male with significant past medical history of:      Diagnosis Date    Anxiety     GERD (gastroesophageal reflux disease)     Heartburn     Hypertension      Interval history: Acute hypoxemic respiratory failure has continued to worsen. Now requiring heated high flow oxygen at 80% and 45 L/min.       Past Surgical History:        Procedure Laterality Date    HERNIA REPAIR      TESTICLE SURGERY      spermatocele     Current Medications:    Current Facility-Administered Medications: oxyCODONE-acetaminophen (PERCOCET)  MG per tablet 1 tablet, 1 tablet, Oral, Q6H PRN  polyvinyl alcohol (LIQUIFILM TEARS) 1.4 % ophthalmic solution 1 drop, 1 drop, Both Eyes, Q4H PRN  morphine (PF) injection 2 mg, 2 mg, IntraVENous, Q4H PRN  sodium chloride flush 0.9 % injection 5-40 mL, 5-40 mL, IntraVENous, 2 times per day  sodium chloride flush 0.9 % injection 5-40 mL, 5-40 mL, IntraVENous, PRN  0.9 % sodium chloride infusion, 25 mL, IntraVENous, PRN  famotidine (PEPCID) tablet 20 mg, 20 mg, Oral, BID  aluminum & magnesium hydroxide-simethicone (MAALOX) 200-200-20 MG/5ML suspension 30 mL, 30 mL, Oral, Q6H PRN  ondansetron (ZOFRAN-ODT) disintegrating tablet 4 mg, 4 mg, Oral, Q8H PRN **OR** ondansetron (ZOFRAN) injection 4 mg, 4 mg, IntraVENous, Q6H PRN  polyethylene glycol (GLYCOLAX) packet 17 g, 17 g, Oral, Daily PRN  acetaminophen (TYLENOL) tablet 650 mg, 650 mg, Oral, Q6H PRN **OR** acetaminophen (TYLENOL) suppository 650 mg, 650 mg, Rectal, Q6H PRN  guaiFENesin-dextromethorphan (ROBITUSSIN DM) 100-10 MG/5ML syrup 5 mL, 5 mL, Oral, Q4H PRN  Vitamin D (CHOLECALCIFEROL) tablet 2,000 Units, 2,000 Units, Oral, Daily  dexamethasone (DECADRON) 20 mg in sodium chloride 0.9 % IVPB, 20 mg, IntraVENous, Daily  insulin lispro (1 Unit Dial) 0-6 Units, 0-6 Units, SubCUTAneous, TID WC  insulin lispro (1 Unit Dial) 0-3 Units, 0-3 Units, SubCUTAneous, Nightly  glucose (GLUTOSE) 40 % oral gel 15 g, 15 g, Oral, PRN  dextrose 50 % IV solution, 12.5 g, IntraVENous, PRN  glucagon (rDNA) injection 1 mg, 1 mg, IntraMUSCular, PRN  dextrose 5 % solution, 100 mL/hr, IntraVENous, PRN  [COMPLETED] remdesivir 200 mg in sodium chloride 0.9 % 250 mL IVPB, 200 mg, IntraVENous, Once **FOLLOWED BY** remdesivir 100 mg in sodium chloride 0.9 % 250 mL IVPB, 100 mg, IntraVENous, Q24H  heparin (porcine) injection 7,260 Units, 80 Units/kg, IntraVENous, PRN  heparin (porcine) injection 3,630 Units, 40 Units/kg, IntraVENous, PRN  heparin 25,000 units in dextrose 5% 250 mL (premix) infusion, 5-30 Units/kg/hr, IntraVENous, Continuous    Allergies   Allergen Reactions    Codeine        Social History:    TOBACCO:   reports that he quit smoking about 8 years ago. His smoking use included cigarettes. He smoked 1.00 pack per day. He has never used smokeless tobacco.  ETOH:   reports no history of alcohol use. Patient currently lives independently  Environmental/chemical exposure: None known    Family History:       Problem Relation Age of Onset    Heart Disease Father     High Cholesterol Father     High Blood Pressure Father     High Blood Pressure Maternal Grandfather     Stroke Maternal Grandfather     Cancer Paternal Grandfather         lung    Other Mother         colectomy due to diverticulitis     REVIEW OF SYSTEMS:    CONSTITUTIONAL:  negative for fevers, chills, diaphoresis, activity change, appetite change, fatigue, night sweats and unexpected weight change.    EYES:  negative for blurred vision, eye discharge, visual disturbance and icterus  HEENT:  negative for hearing loss, tinnitus, ear drainage, sinus pressure, nasal congestion, epistaxis and snoring  RESPIRATORY:  See HPI  CARDIOVASCULAR:  negative for chest pain, palpitations, exertional chest pressure/discomfort, edema, syncope  GASTROINTESTINAL:  negative for nausea, vomiting, diarrhea, constipation, blood in stool and abdominal pain  GENITOURINARY:  negative for frequency, dysuria, urinary incontinence, decreased urine volume, and hematuria  HEMATOLOGIC/LYMPHATIC:  negative for easy bruising, bleeding and lymphadenopathy  ALLERGIC/IMMUNOLOGIC:  negative for recurrent infections, angioedema, anaphylaxis and drug reactions  ENDOCRINE:  negative for weight changes and diabetic symptoms including polyuria, polydipsia and polyphagia  MUSCULOSKELETAL:  negative for  pain, joint swelling, decreased range of motion and muscle weakness  NEUROLOGICAL:  negative for headaches, slurred speech, unilateral weakness  PSYCHIATRIC/BEHAVIORAL: negative for hallucinations, behavioral problems, confusion and agitation. Objective:   PHYSICAL EXAM:      VITALS:  /88   Pulse 87   Temp 97.8 °F (36.6 °C) (Oral)   Resp 20   Ht 5' 7.5\" (1.715 m)   Wt 203 lb 9.6 oz (92.4 kg)   SpO2 94%   BMI 31.42 kg/m²      24HR INTAKE/OUTPUT:      Intake/Output Summary (Last 24 hours) at 9/28/2021 1243  Last data filed at 9/28/2021 7515  Gross per 24 hour   Intake 240 ml   Output 2625 ml   Net -2385 ml     CONSTITUTIONAL:  awake, alert, cooperative, no apparent distress, and appears stated age  NECK:  Supple, symmetrical, trachea midline, no adenopathy, thyroid symmetric, not enlarged and no tenderness, skin normal  LUNGS:  no increased work of breathing and basilar crackles to auscultation. No accessory muscle use  CARDIOVASCULAR: S1 and S2, no edema and no JVD  ABDOMEN:  normal bowel sounds, non-distended and no masses palpated, and no tenderness to palpation. No hepatospleenomegaly  LYMPHADENOPATHY:  no axillary or supraclavicular adenopathy. No cervical adnenopathy  PSYCHIATRIC: Oriented to person place and time. No obvious depression or anxiety.   MUSCULOSKELETAL: No obvious misalignment or effusion of the joints. No clubbing, cyanosis of the digits. SKIN:  normal skin color, texture, turgor and no redness, warmth, or swelling. No palpable nodules    DATA:    Old records have been reviewed    CBC:  Recent Labs     09/26/21 0426 09/27/21  0512 09/28/21  0449   WBC 11.9* 17.2* 18.5*   RBC 5.37 5.68 5.54   HGB 14.5 15.2 15.0   HCT 41.3 44.4 44.2    226 269   MCV 76.9* 78.3* 79.8*   MCH 27.0 26.8 27.1   MCHC 35.1 34.3 33.9   RDW 14.6 14.6 14.5   BANDSPCT  --   --  2      BMP:  Recent Labs     09/26/21 0427 09/27/21  0512 09/28/21  0449   * 132* 134*   K 3.5 3.7 4.3   CL 90* 96* 96*   CO2 21 22 26   BUN 90* 73* 50*   CREATININE 2.4* 1.8* 1.4*   CALCIUM 8.4 8.7 8.6   GLUCOSE 173* 160* 160*      ABG:  Recent Labs     09/27/21  1302   PHART 7.467*   GTN8YXE 38.4   PO2ART 56.2*   RAS1XNQ 27.7   Y0AYQLBH 91.0*   BEART 3.8*     Procalcitonin  Recent Labs     09/27/21  0512   PROCAL 0.13       No results found for: BNP  Lab Results   Component Value Date    CKTOTAL 267 09/24/2021    TROPONINI <0.01 09/25/2021           Radiology Review:  All pertinent images / reports were reviewed as a part of this visit. Assessment:     1. Acute hypoxemic respiratory failure  2. COVID-19 pneumonia  3. Hypercoagulable state      Plan:     I have reviewed laboratories, medical records and images for this visit  Chest x-ray 9/24/2021 shows multifocal airspace disease worse on the left than on the right. Covid PCR positive  Procalcitonin has been mildly elevated  Has not required antibiotic therapy  On Decadron 20 mg daily and remdesivir 100 mg daily  Also received 1 dose Actemra  CRP is downtrending. He was 267 on admission and is now 85.3. Repeat chest x-ray shows multifocal airspace disease similar to previous or perhaps slightly worse. Respiratory failure has continued to worsen. Now on heated high flow oxygen. At risk for needing BiPAP mask ventilation.   If saturations deteriorate further, may need to move to ICU  Stressed the benefit of prone and semiprone positioning to the patient

## 2021-09-28 NOTE — PROGRESS NOTES
[facility] PROGRESS NOTE    9/28/2021 9:17 AM        Name: Tatiana Cartwright .               Admitted: 9/24/2021  Primary Care Provider: Marco Pal MD (Tel: [phone])                        Interval history:    Patient seen and examined today  Overnight events and interval ancillary notes reviewed  Afebrile overnight, WBCs elevated likely secondary to steroids  O2 requirement increased; currently on Airvo 77% FiO2 at 77 L/min satting around 91%  Transferred to ICU      Current Medications  oxyCODONE-acetaminophen (PERCOCET)  MG per tablet 1 tablet, Q6H PRN  polyvinyl alcohol (LIQUIFILM TEARS) 1.4 % ophthalmic solution 1 drop, Q4H PRN  morphine (PF) injection 2 mg, Q4H PRN  sodium chloride flush 0.9 % injection 5-40 mL, 2 times per day  sodium chloride flush 0.9 % injection 5-40 mL, PRN  0.9 % sodium chloride infusion, PRN  famotidine (PEPCID) tablet 20 mg, BID  aluminum & magnesium hydroxide-simethicone (MAALOX) 200-200-20 MG/5ML suspension 30 mL, Q6H PRN  ondansetron (ZOFRAN-ODT) disintegrating tablet 4 mg, Q8H PRN   Or  ondansetron (ZOFRAN) injection 4 mg, Q6H PRN  polyethylene glycol (GLYCOLAX) packet 17 g, Daily PRN  acetaminophen (TYLENOL) tablet 650 mg, Q6H PRN   Or  acetaminophen (TYLENOL) suppository 650 mg, Q6H PRN  guaiFENesin-dextromethorphan (ROBITUSSIN DM) 100-10 MG/5ML syrup 5 mL, Q4H PRN  Vitamin D (CHOLECALCIFEROL) tablet 2,000 Units, Daily  dexamethasone (DECADRON) 20 mg in sodium chloride 0.9 % IVPB, Daily  insulin lispro (1 Unit Dial) 0-6 Units, TID WC  insulin lispro (1 Unit Dial) 0-3 Units, Nightly  glucose (GLUTOSE) 40 % oral gel 15 g, PRN  dextrose 50 % IV solution, PRN  glucagon (rDNA) injection 1 mg, PRN  dextrose 5 % solution, PRN  remdesivir 100 mg in sodium chloride 0.9 % 250 mL IVPB, Q24H  heparin (porcine) injection 7,260 Units, PRN  heparin (porcine) injection 3,630 Units, PRN  heparin 25,000 units in dextrose 5% 250 mL (premix) infusion, Continuous        Objective:  BP (!) 137/91   Pulse 73   Temp 97.7 °F (36.5 °C) (Oral)   Resp 20   Ht 5' 7.5\" (1.715 m)   Wt 203 lb 9.6 oz (92.4 kg)   SpO2 91%   BMI 31.42 kg/m²     Intake/Output Summary (Last 24 hours) at 9/28/2021 0917  Last data filed at 9/28/2021 5808  Gross per 24 hour   Intake 240 ml   Output 3050 ml   Net -2810 ml      Wt Readings from Last 3 Encounters:   09/27/21 203 lb 9.6 oz (92.4 kg)   09/30/15 208 lb 12.8 oz (94.7 kg)   07/17/14 195 lb (88.5 kg)       General appearance:  Appears comfortable  Eyes: Sclera clear. Pupils equal.  ENT: Moist oral mucosa. Trachea midline, no adenopathy. Cardiovascular: Regular rhythm, normal S1, S2. No murmur. No edema in lower extremities  Respiratory: Not using accessory muscles. Good inspiratory effort. Clear to auscultation bilaterally, no wheeze or crackles. GI: Abdomen soft, no tenderness, not distended, normal bowel sounds  Musculoskeletal: No cyanosis in digits, neck supple  Neurology: CN 2-12 grossly intact. No speech or motor deficits  Psych: Normal affect.  Alert and oriented in time, place and person  Skin: Warm, dry, normal turgor    Labs and Tests:  CBC:   Recent Labs     09/26/21 0426 09/27/21 0512 09/28/21 0449   WBC 11.9* 17.2* 18.5*   HGB 14.5 15.2 15.0    226 269     BMP:    Recent Labs     09/26/21 0427 09/27/21 0512 09/28/21 0449   * 132* 134*   K 3.5 3.7 4.3   CL 90* 96* 96*   CO2 21 22 26   BUN 90* 73* 50*   CREATININE 2.4* 1.8* 1.4*   GLUCOSE 173* 160* 160*     Hepatic:   Recent Labs     09/26/21 0427 09/27/21 0512 09/28/21 0449   AST 39* 31 74*   ALT 30 34 91*   BILITOT <0.2 0.3 0.4   ALKPHOS 69 75 85       Discussed care with patient       Spent 30  minutes with patient and family at bedside and on unit reviewing medical records and labs, spent greater than 50% time counseling patient and family on diagnosis and plan   Problem List  Principal Problem:    Pneumonia due to COVID-19 virus  Active Problems:    HTN (hypertension)    GERD (gastroesophageal reflux disease)    Acute respiratory failure with hypoxia (HCC)    ASHWINI (acute kidney injury) ([facility] [address]    Hyponatremia    Anxiety    Obesity (BMI 30-39. 9)  Resolved Problems:    * No resolved hospital problems. *       Assessment & Plan:       Acute hypoxic respiratory failure secondary to Covid pneumonia; worsening transferred to ICU  Chest x-ray showed multifocal airspace disease  Procalcitonin mildly elevated  Pulmonology on board appreciate their recs  Continue supplemental oxygen keep sats above 90%  Status post Actemra; Continue Decadron and remdesivir  CRP trending down  Continue anticoagulation for hypercoagulable state    ASHWINI: Creatinine elevated 2.1 on admission; trending down  Nephrology on board appreciate their recs  Renal ultrasound and negative for hydronephrosis or calculi  Avoid nephrotoxin including home lisinopril  Output and daily weights    Hyponatremia: Improved    Anion gap acidosis: Resolved      Diet: ADULT DIET;  Regular  Adult Oral Nutrition Supplement; Standard High Calorie/High Protein Oral Supplement  Code:Full Code  DVT PPX grey Spicer MD   9/28/2021 9:17 AM

## 2021-09-28 NOTE — PROGRESS NOTES
Sister Felipe Beverly notified of transfer to [facility] an updated on care.

## 2021-09-29 LAB
A/G RATIO: 0.9 (ref 1.1–2.2)
ALBUMIN SERPL-MCNC: 3.2 G/DL (ref 3.4–5)
ALP BLD-CCNC: 81 U/L (ref 40–129)
ALT SERPL-CCNC: 65 U/L (ref 10–40)
ANION GAP SERPL CALCULATED.3IONS-SCNC: 11 MMOL/L (ref 3–16)
APTT: 113.8 SEC (ref 26.2–38.6)
AST SERPL-CCNC: 29 U/L (ref 15–37)
BASOPHILS ABSOLUTE: 0 K/UL (ref 0–0.2)
BASOPHILS RELATIVE PERCENT: 0 %
BILIRUB SERPL-MCNC: 0.5 MG/DL (ref 0–1)
BUN BLDV-MCNC: 40 MG/DL (ref 7–20)
C-REACTIVE PROTEIN: 30.6 MG/L (ref 0–5.1)
CALCIUM SERPL-MCNC: 9 MG/DL (ref 8.3–10.6)
CHLORIDE BLD-SCNC: 97 MMOL/L (ref 99–110)
CO2: 27 MMOL/L (ref 21–32)
CREAT SERPL-MCNC: 1.1 MG/DL (ref 0.8–1.3)
CULTURE, BLOOD 2: NORMAL
D DIMER: 299 NG/ML DDU (ref 0–229)
EOSINOPHILS ABSOLUTE: 0 K/UL (ref 0–0.6)
EOSINOPHILS RELATIVE PERCENT: 0 %
GFR AFRICAN AMERICAN: >60
GFR NON-AFRICAN AMERICAN: >60
GLOBULIN: 3.4 G/DL
GLUCOSE BLD-MCNC: 116 MG/DL (ref 70–99)
GLUCOSE BLD-MCNC: 130 MG/DL (ref 70–99)
GLUCOSE BLD-MCNC: 133 MG/DL (ref 70–99)
GLUCOSE BLD-MCNC: 171 MG/DL (ref 70–99)
GLUCOSE BLD-MCNC: 183 MG/DL (ref 70–99)
HCT VFR BLD CALC: 46.1 % (ref 40.5–52.5)
HEMOGLOBIN: 15.7 G/DL (ref 13.5–17.5)
LYMPHOCYTES ABSOLUTE: 0.5 K/UL (ref 1–5.1)
LYMPHOCYTES RELATIVE PERCENT: 3 %
MCH RBC QN AUTO: 26.7 PG (ref 26–34)
MCHC RBC AUTO-ENTMCNC: 34 G/DL (ref 31–36)
MCV RBC AUTO: 78.4 FL (ref 80–100)
MONOCYTES ABSOLUTE: 0.3 K/UL (ref 0–1.3)
MONOCYTES RELATIVE PERCENT: 2 %
NEUTROPHILS ABSOLUTE: 15.8 K/UL (ref 1.7–7.7)
NEUTROPHILS RELATIVE PERCENT: 95 %
PDW BLD-RTO: 14.3 % (ref 12.4–15.4)
PERFORMED ON: ABNORMAL
PLATELET # BLD: 276 K/UL (ref 135–450)
PLATELET SLIDE REVIEW: ADEQUATE
PMV BLD AUTO: 9 FL (ref 5–10.5)
POTASSIUM REFLEX MAGNESIUM: 4.1 MMOL/L (ref 3.5–5.1)
RBC # BLD: 5.88 M/UL (ref 4.2–5.9)
RBC # BLD: NORMAL 10*6/UL
SLIDE REVIEW: ABNORMAL
SODIUM BLD-SCNC: 135 MMOL/L (ref 136–145)
TOTAL PROTEIN: 6.6 G/DL (ref 6.4–8.2)
WBC # BLD: 16.6 K/UL (ref 4–11)

## 2021-09-29 PROCEDURE — 6370000000 HC RX 637 (ALT 250 FOR IP): Performed by: HOSPITALIST

## 2021-09-29 PROCEDURE — 99291 CRITICAL CARE FIRST HOUR: CPT | Performed by: INTERNAL MEDICINE

## 2021-09-29 PROCEDURE — 6370000000 HC RX 637 (ALT 250 FOR IP): Performed by: INTERNAL MEDICINE

## 2021-09-29 PROCEDURE — 6360000002 HC RX W HCPCS: Performed by: HOSPITALIST

## 2021-09-29 PROCEDURE — 36415 COLL VENOUS BLD VENIPUNCTURE: CPT

## 2021-09-29 PROCEDURE — 80053 COMPREHEN METABOLIC PANEL: CPT

## 2021-09-29 PROCEDURE — 6360000002 HC RX W HCPCS: Performed by: INTERNAL MEDICINE

## 2021-09-29 PROCEDURE — 2580000003 HC RX 258: Performed by: INTERNAL MEDICINE

## 2021-09-29 PROCEDURE — 85730 THROMBOPLASTIN TIME PARTIAL: CPT

## 2021-09-29 PROCEDURE — 85379 FIBRIN DEGRADATION QUANT: CPT

## 2021-09-29 PROCEDURE — 2700000000 HC OXYGEN THERAPY PER DAY

## 2021-09-29 PROCEDURE — 2500000003 HC RX 250 WO HCPCS: Performed by: INTERNAL MEDICINE

## 2021-09-29 PROCEDURE — 2060000000 HC ICU INTERMEDIATE R&B

## 2021-09-29 PROCEDURE — 86140 C-REACTIVE PROTEIN: CPT

## 2021-09-29 PROCEDURE — 85025 COMPLETE CBC W/AUTO DIFF WBC: CPT

## 2021-09-29 PROCEDURE — 94761 N-INVAS EAR/PLS OXIMETRY MLT: CPT

## 2021-09-29 RX ORDER — INSULIN LISPRO 100 [IU]/ML
0-12 INJECTION, SOLUTION INTRAVENOUS; SUBCUTANEOUS
Status: DISCONTINUED | OUTPATIENT
Start: 2021-09-29 | End: 2021-10-02 | Stop reason: HOSPADM

## 2021-09-29 RX ORDER — INSULIN LISPRO 100 [IU]/ML
0-6 INJECTION, SOLUTION INTRAVENOUS; SUBCUTANEOUS NIGHTLY
Status: DISCONTINUED | OUTPATIENT
Start: 2021-09-29 | End: 2021-10-02 | Stop reason: HOSPADM

## 2021-09-29 RX ADMIN — Medication 2000 UNITS: at 10:08

## 2021-09-29 RX ADMIN — REMDESIVIR 100 MG: 100 INJECTION, POWDER, LYOPHILIZED, FOR SOLUTION INTRAVENOUS at 12:14

## 2021-09-29 RX ADMIN — Medication 10 ML: at 12:12

## 2021-09-29 RX ADMIN — OXYCODONE AND ACETAMINOPHEN 1 TABLET: 10; 325 TABLET ORAL at 10:08

## 2021-09-29 RX ADMIN — Medication 10 ML: at 21:20

## 2021-09-29 RX ADMIN — ENOXAPARIN SODIUM 90 MG: 100 INJECTION SUBCUTANEOUS at 10:10

## 2021-09-29 RX ADMIN — FAMOTIDINE 20 MG: 20 TABLET, FILM COATED ORAL at 21:20

## 2021-09-29 RX ADMIN — MORPHINE SULFATE 2 MG: 2 INJECTION, SOLUTION INTRAMUSCULAR; INTRAVENOUS at 14:00

## 2021-09-29 RX ADMIN — FAMOTIDINE 20 MG: 20 TABLET, FILM COATED ORAL at 10:08

## 2021-09-29 RX ADMIN — ENOXAPARIN SODIUM 90 MG: 100 INJECTION SUBCUTANEOUS at 21:18

## 2021-09-29 RX ADMIN — SODIUM CHLORIDE 25 ML: 9 INJECTION, SOLUTION INTRAVENOUS at 10:17

## 2021-09-29 RX ADMIN — FUROSEMIDE 20 MG: 10 INJECTION, SOLUTION INTRAMUSCULAR; INTRAVENOUS at 10:10

## 2021-09-29 RX ADMIN — POLYETHYLENE GLYCOL 3350 17 G: 17 POWDER, FOR SOLUTION ORAL at 14:00

## 2021-09-29 RX ADMIN — INSULIN LISPRO 2 UNITS: 100 INJECTION, SOLUTION INTRAVENOUS; SUBCUTANEOUS at 16:46

## 2021-09-29 RX ADMIN — DEXAMETHASONE SODIUM PHOSPHATE 20 MG: 4 INJECTION, SOLUTION INTRA-ARTICULAR; INTRALESIONAL; INTRAMUSCULAR; INTRAVENOUS; SOFT TISSUE at 10:17

## 2021-09-29 RX ADMIN — INSULIN LISPRO 1 UNITS: 100 INJECTION, SOLUTION INTRAVENOUS; SUBCUTANEOUS at 21:21

## 2021-09-29 RX ADMIN — FUROSEMIDE 20 MG: 10 INJECTION, SOLUTION INTRAMUSCULAR; INTRAVENOUS at 17:28

## 2021-09-29 ASSESSMENT — PAIN SCALES - GENERAL
PAINLEVEL_OUTOF10: 0
PAINLEVEL_OUTOF10: 8
PAINLEVEL_OUTOF10: 9
PAINLEVEL_OUTOF10: 9
PAINLEVEL_OUTOF10: 8
PAINLEVEL_OUTOF10: 0

## 2021-09-29 ASSESSMENT — PAIN DESCRIPTION - PAIN TYPE
TYPE: CHRONIC PAIN
TYPE: CHRONIC PAIN

## 2021-09-29 ASSESSMENT — PAIN DESCRIPTION - LOCATION
LOCATION: GROIN
LOCATION: GROIN

## 2021-09-29 ASSESSMENT — PAIN DESCRIPTION - FREQUENCY: FREQUENCY: CONTINUOUS

## 2021-09-29 NOTE — PROGRESS NOTES
Able to wean heated high flow to 80% 50L at this time, jarvis catheter removed and patient demonstrated ability to use urinal in the bed.

## 2021-09-29 NOTE — PROGRESS NOTES
MD Jazmyn Maguire MD Viva Rasp, MD               Office: [phone]                      Fax: [phone]             [facility]                   NEPHROLOGY INPATIENT PROGRESS NOTE:     PATIENT NAME: Barbie Patel  : 1960  MRN: [id number]        RECOMMENDATIONS:   COVID-19 management: ongoing   - Rx: Lasix IV BID  -ok for Actemra - no renal dosing   -ok for Ramdesivir : short course: 3-5 days. 100 mg   -IV Decadron  -Convalescent plasma not given  -A/C per protocol - to prevent renal microthrombi too   -Pneumonia antibiotic coverage, might need as elevated pro-calcitonin  -Hypoxia monitoring - severe needing HFNC - - some improvement. - hold home Lisinopril   - monitor PVR w/ bladder scan for jarvis insertion need. - no need for dialysis , but at higher risk for decompensation, needing closer monitoring. D/C plan from renal stand point:  - expect 3-4 days, w/ ASHWINI, COVID hypoxia    D/W primary team - hospitalist  - Dr Lamin Grady:       Admitted for:  ASHWINI (acute kidney injury) ([facility] [address] [N17.9]  Acute respiratory failure with hypoxemia ([facility] [address] [J96.01]  Community acquired pneumonia of left lung, unspecified part of lung [J18.9]  Pneumonia due to COVID-19 virus [U07.1, J12.82]      ASHWINI (on non -CKD: likely stage 2 ):    - BL Scr- 0.9 as off 2021 ---> 2.1 on admission  -: Etiology of ASHWINI - presumed clinically ATN   - other differentials: ?covid related nephropathy, unlikely GN / TI / TMA process   - UA : reviewed = fine cast = ATN   - Renal US - Mildly increased renal cortical echogenicity but the cortical thickness is   maintained.  No shadowing calculi or hydronephrosis. .     Associated problems:   Hyponatremia - moderate - d/to ASHWINI   Hypokalemia   Acidosis : high -AGMA , d/to ASHWINI, LA WNL    Other major problems: Management per primary and other consulting teams.    //   Acute Hypoxia respiratory failure- needing NIPPV   COVID (+) PNA      Hospital Problems         Last Modified POA    * (Principal) Pneumonia due to COVID-19 virus 9/25/2021 Yes    HTN (hypertension) 9/25/2021 Yes    GERD (gastroesophageal reflux disease) 9/25/2021 Yes    Acute respiratory failure with hypoxia ([facility] [address] 9/25/2021 Yes    ASHWINI (acute kidney injury) ([facility] [address] 9/25/2021 Yes    Hyponatremia 9/25/2021 Yes    Anxiety 9/25/2021 Yes    Obesity (BMI 30-39.9) 9/25/2021 Yes        : other supportive care :   - Check daily renal function panel with electrolytes-phosphorus  - Strict monitoring of I/Os, daily weight  - Renal feeds/diet  - Current medications reviewed. - Nephrotoxic medications have been discontinued. - Dose adjusted and appropriate. - Dose meds for eGFR <15 mL/min/1.73m2 during ASHWINI    - Avoid heavy opioids due to renal failure - may use very low dose dilaudid / fentanyl with close monitoring of CNS and respiratory depression. Please refer to the orders. High Complexity. Multiple complex problems. Discussed with patient 's treatment team   Time spent > 30 (~35) minutes. Thank you for allowing me to participate in this patient's care. Please do not hesitate to contact me anytime. We will follow along with you. Lorri Painting MD,  [facility] [address] [place] [address]: [phone] or [address] [employer]  Fax: [phone]        ========================================================   ========================================================      This patient is COVID-19 positive, so in a strict isolation, as per current protocol; So in order to preserve PPE supply and to avoid unnecessary exposure to prevent transmission of COVID-19; this patient was NOT examined face to face, as risk of contact outweighs the benefits and likely would not change much of my clinical management.    HPI, review of system and physical exam was limited, as it was mainly obtained from chart review and the primary team. But all relevant records and diagnostic tests were reviewed, including laboratory and imaging results. Patient's care is being coordinated with the primary service. Subjective:  Seen via doors  COVID (+)  Hypoxia needing NIPPV , some improvement. BP better  Past medical, Surgical, Social, Family medical history reviewed by me. MEDICATIONS: reviewed by me. Medications Prior to Admission:  No current facility-administered medications on file prior to encounter. Current Outpatient Medications on File Prior to Encounter   Medication Sig Dispense Refill    oxyCODONE-acetaminophen (PERCOCET)  MG per tablet Take 1 tablet by mouth every 8 hours as needed for Pain.  lisinopril (PRINIVIL;ZESTRIL) 20 MG tablet TAKE 1 TABLET BY MOUTH DAILY 30 tablet 1    omeprazole (PRILOSEC) 20 MG capsule Take 20 mg by mouth daily      HYDROcodone-acetaminophen (NORCO) 5-325 MG per tablet Take 1 tablet by mouth 2 times daily      fluticasone (FLONASE) 50 MCG/ACT nasal spray 2 sprays by Nasal route daily      baclofen (LIORESAL) 10 MG tablet Take 10 mg by mouth 2 times daily as needed      DULoxetine (CYMBALTA) 30 MG capsule Take 1 capsule by mouth daily 30 capsule 3    ALPRAZolam (XANAX) 0.5 MG tablet Take 1 tablet by mouth 2 times daily as needed for Sleep 60 tablet 0    Melatonin 5 MG TABS tablet Take 1 tablet by mouth nightly as needed.            Current Facility-Administered Medications:     insulin lispro (1 Unit Dial) 0-12 Units, 0-12 Units, SubCUTAneous, TID WC, Violeta Martinez MD    insulin lispro (1 Unit Dial) 0-6 Units, 0-6 Units, SubCUTAneous, Nightly, MD Sofiya Todd  [START ON 9/30/2021] remdesivir 100 mg in sodium chloride 0.9 % 250 mL IVPB, 100 mg, IntraVENous, Q24H, Precious Barth MD    enoxaparin (LOVENOX) injection 90 mg, 1 mg/kg, SubCUTAneous, BID, Precious Barth MD    furosemide (LASIX) injection 20 mg, 20 mg, IntraVENous, BID, Santana Merchant MD, 20 mg at 09/28/21 1509    hydrALAZINE (APRESOLINE) injection 10 mg, 10 mg, IntraVENous, Q6H PRN, Jayro Michael MD, 10 mg at 09/28/21 2046    oxyCODONE-acetaminophen (PERCOCET)  MG per tablet 1 tablet, 1 tablet, Oral, Q6H PRN, Ignacio Dunne MD, 1 tablet at 09/28/21 2209    polyvinyl alcohol (LIQUIFILM TEARS) 1.4 % ophthalmic solution 1 drop, 1 drop, Both Eyes, Q4H PRN, Satinder Cotton MD, 1 drop at 09/26/21 2021    morphine (PF) injection 2 mg, 2 mg, IntraVENous, Q4H PRN, Ignacio Dunne MD, 2 mg at 09/28/21 2045    sodium chloride flush 0.9 % injection 5-40 mL, 5-40 mL, IntraVENous, 2 times per day, Mandi Hamm MD, 10 mL at 09/28/21 2050    sodium chloride flush 0.9 % injection 5-40 mL, 5-40 mL, IntraVENous, PRN, Terry Spann MD    0.9 % sodium chloride infusion, 25 mL, IntraVENous, PRN, Mandi Hamm MD, Last Rate: 100 mL/hr at 09/28/21 1031, 25 mL at 09/28/21 1031    famotidine (PEPCID) tablet 20 mg, 20 mg, Oral, BID, Mandi Hamm MD, 20 mg at 09/28/21 2047    aluminum & magnesium hydroxide-simethicone (MAALOX) 200-200-20 MG/5ML suspension 30 mL, 30 mL, Oral, Q6H PRN, Mandi Hamm MD    ondansetron (ZOFRAN-ODT) disintegrating tablet 4 mg, 4 mg, Oral, Q8H PRN, 4 mg at 09/28/21 1029 **OR** ondansetron (ZOFRAN) injection 4 mg, 4 mg, IntraVENous, Q6H PRN, Mandi Hamm MD, 4 mg at 09/27/21 1140    polyethylene glycol (GLYCOLAX) packet 17 g, 17 g, Oral, Daily PRN, Mandi Hamm MD    acetaminophen (TYLENOL) tablet 650 mg, 650 mg, Oral, Q6H PRN, 650 mg at 09/28/21 0913 **OR** acetaminophen (TYLENOL) suppository 650 mg, 650 mg, Rectal, Q6H PRN, Terry Spann MD    guaiFENesin-dextromethorphan (ROBITUSSIN DM) 100-10 MG/5ML syrup 5 mL, 5 mL, Oral, Q4H PRN, Mandi Hamm MD    Vitamin D (CHOLECALCIFEROL) tablet 2,000 Units, 2,000 Units, Oral, Daily, Mandi Hamm MD, 2,000 Units at 09/28/21 0913    dexamethasone (DECADRON) 20 mg in sodium chloride 0.9 % IVPB, 20 mg, IntraVENous, Daily, Satinder Cotton MD, Stopped at 09/28/21 1015    glucose (GLUTOSE) 40 % oral gel 15 g, 15 g, Oral, PRN, Chanel Abbott MD    dextrose 50 % IV solution, 12.5 g, IntraVENous, PRN, Chanel Abbott MD    glucagon (rDNA) injection 1 mg, 1 mg, IntraMUSCular, PRN, Chanel Abbott MD    dextrose 5 % solution, 100 mL/hr, IntraVENous, PRN, Chanel Abbott MD    [COMPLETED] remdesivir 200 mg in sodium chloride 0.9 % 250 mL IVPB, 200 mg, IntraVENous, Once, Stopped at 09/25/21 2332 **FOLLOWED BY** remdesivir 100 mg in sodium chloride 0.9 % 250 mL IVPB, 100 mg, IntraVENous, Q24H, Bhumi Flores MD, Stopped at 09/28/21 1133    REVIEW OF SYSTEMS  Review of systems not obtained due to patient factors      PHYSICAL EXAM:  Recent vital signs and recent I/Os reviewed by me.      Wt Readings from Last 3 Encounters:   09/29/21 197 lb 4.8 oz (89.5 kg)   09/30/15 208 lb 12.8 oz (94.7 kg)   07/17/14 195 lb (88.5 kg)     BP Readings from Last 3 Encounters:   09/29/21 (!) 142/108   09/30/15 (!) 130/100   07/17/14 122/90     Patient Vitals for the past 24 hrs:   BP Temp Temp src Pulse Resp SpO2 Weight   09/29/21 0819 -- -- -- -- 17 93 % --   09/29/21 0700 (!) 142/108 -- -- 77 14 90 % --   09/29/21 0600 (!) 132/107 -- -- 85 16 93 % 197 lb 4.8 oz (89.5 kg)   09/29/21 0500 (!) 124/95 -- -- 90 21 (!) 89 % --   09/29/21 0400 108/76 98.9 °F (37.2 °C) Temporal 78 15 (!) 89 % --   09/29/21 0313 -- -- -- -- 16 95 % --   09/29/21 0312 -- -- -- -- 16 94 % --   09/29/21 0300 97/80 -- -- 81 15 95 % --   09/29/21 0200 108/87 -- -- 83 14 91 % --   09/29/21 0100 (!) 108/90 -- -- 84 13 95 % --   09/29/21 0000 (!) 110/94 98.2 °F (36.8 °C) Temporal 88 14 96 % --   09/28/21 2304 -- -- -- -- -- 95 % --   09/28/21 2300 128/89 -- -- 98 26 97 % --   09/28/21 2200 (!) 129/100 -- -- 90 15 97 % --   09/28/21 2100 (!) 131/94 -- -- 98 15 97 % --   09/28/21 2045 (!) 142/110 -- -- -- -- -- --   09/28/21 2000 (!) 142/110 97.5 °F (36.4 °C) Temporal 97 16 93 % --   09/28/21 1949 -- -- -- -- 22 94 % --   09/28/21 1900 (!) 155/113 -- -- 118 17 92 % --   09/28/21 1830 (!) 164/127 -- -- -- -- -- --   09/28/21 1800 (!) 153/113 -- -- -- -- -- --   09/28/21 1745 -- 97 °F (36.1 °C) Temporal -- -- -- --   09/28/21 1506 (!) 152/93 97.6 °F (36.4 °C) Axillary 77 18 91 % --   09/28/21 1308 -- -- -- -- 18 91 % --   09/28/21 1245 -- -- -- -- -- 92 % --   09/28/21 1156 128/88 97.8 °F (36.6 °C) Oral 87 20 94 % --       Intake/Output Summary (Last 24 hours) at 9/29/2021 0944  Last data filed at 9/29/2021 0600  Gross per 24 hour   Intake --   Output 2850 ml   Net -2850 ml          Physical exam:    In-person bedside physical examination deferred. Pursuant to the emergency declaration under the [address] [place] [address], [address] [place] [address] and the [employer] and [employer] General Act, this clinical encounter was conducted to provide necessary medical care. (Also consistent with new provisions and guidance offered by Surjit Royal on March 18, 2020 in setting of COVID 19 outbreak and in order to preserve personal protective equipment in accordance with the flexibilities announced by CMS on March 30, 2020)   References: [email or web address]://[email or web address]. [email or web address]/Resources/COVID-[email or web address]/3_18%20Telemed%20Guidance%20Updated%20March%2018. pdf?[email or web address]                      [email or web address]://[email or web address]. [email or web address]/Resources/COVID-19/3_18%20Telemed%20Guidance%20Updated%20March%2018. pdf?atv=[email or web address]                      [email or web address] pdf    However, I did visually inspect the patient and observed the following:      Vitals signs reviewed. Constitutional:       General: not in acute distress. Appearance:   ill-appearing. HENT:      Head: Normocephalic and atraumatic. Nose: Nose normal.      Mouth/Throat:      Mouth: Mucous membranes are moist.   Eyes:      General: No scleral icterus.      Conjunctiva/sclera: Conjunctivae normal.   Neck:      Musculoskeletal: Normal range of motion and neck supple. Cardiovascular:      Rate and Rhythm: Normal rate. Pulmonary:      Effort: Pulmonary effort is ab-normal. On HFNC  Abdominal:      General: There is no distension. Musculoskeletal:      Right lower leg: edema. Left lower leg: edema. Skin:     Coloration: Skin is not jaundiced. Neurological:      General: deferred          DATA:  Diagnostic tests reviewed by me for today's visit:   (AS NEEDED FOR MY EVALUATION AND MANAGEMENT). Recent Labs     09/27/21  0512 09/28/21 0449 09/29/21  0520   WBC 17.2* 18.5* 16.6*   HCT 44.4 44.2 46.1    269 276     Iron Saturation:  No components found for: PERCENTFE  FERRITIN:    Lab Results   Component Value Date    FERRITIN 3,230.0 09/25/2021     IRON:  No results found for: IRON  TIBC:  No results found for: TIBC    Recent Labs     09/27/21  0512 09/28/21 0449 09/29/21  0520   * 134* 135*   K 3.7 4.3 4.1   CL 96* 96* 97*   CO2 22 26 27   BUN 73* 50* 40*   CREATININE 1.8* 1.4* 1.1     Recent Labs     09/27/21  0512 09/28/21 0449 09/29/21  0520   CALCIUM 8.7 8.6 9.0     No results for input(s): PH, PCO2, PO2 in the last 72 hours.     Invalid input(s): Bettey Shells    ABG:  No results found for: PH, PCO2, PO2, HCO3, BE, THGB, TCO2, O2SAT  VBG:  No results found for: PHVEN, ZTQ4SVC, BEVEN, D2TTDNAM    LDH:    Lab Results   Component Value Date     09/25/2021     Uric Acid:  No results found for: LABURIC, URICACID    PT/INR:    Lab Results   Component Value Date    PROTIME 15.5 09/25/2021    INR 1.35 09/25/2021     Warfarin PT/INR:  No components found for: Tristin Kelly  PTT:    Lab Results   Component Value Date    APTT 113.8 09/29/2021   [APTT}  Last 3 Troponin:    Lab Results   Component Value Date    TROPONINI <0.01 09/25/2021    TROPONINI <0.01 09/24/2021    TROPONINI 0.01 03/19/2010       U/A:    Lab Results   Component Value Date    COLORU YELLOW 09/24/2021    PROTEINU 100 09/24/2021    PHUR 5.5 09/24/2021    WBCUA 11 09/24/2021    RBCUA 5 09/24/2021    CLARITYU CLOUDY 09/24/2021    SPECGRAV 1.019 09/24/2021    LEUKOCYTESUR Negative 09/24/2021    UROBILINOGEN 0.2 09/24/2021    BILIRUBINUR Negative 09/24/2021    BLOODU MODERATE 09/24/2021    GLUCOSEU Negative 09/24/2021     Microalbumen/Creatinine ratio:  No components found for: RUCREAT  24 Hour Urine for Protein:  No components found for: RAWUPRO, UHRS3, FIND74MK, UTV3  24 Hour Urine for Creatinine Clearance:  No components found for: CREAT4, UHRS10, UTV10  Urine Toxicology:  No components found for: IAMMENTA, IBARBIT, IBENZO, ICOCAINE, IMARTHC, IOPIATES, IPHENCYC    HgBA1c:  No results found for: LABA1C  RPR:  No results found for: RPR  HIV:  No results found for: HIV  KYAW:  No results found for: ANATITER, KYAW  RF:  No results found for: RF  DSDNA:  No components found for: DNA  AMYLASE:  No results found for: AMYLASE  LIPASE:  No results found for: LIPASE  Fibrinogen Level:  No components found for: FIB       BELOW MENTIONED RADIOLOGY STUDY RESULTS BY ME (AS NEEDED FOR MY EVALUATION AND MANAGEMENT). XR CHEST PORTABLE    Result Date: 9/24/2021  EXAMINATION: ONE XRAY VIEW OF THE CHEST 9/24/2021 10:44 pm COMPARISON: 03/19/2010 HISTORY: ORDERING SYSTEM PROVIDED HISTORY: SOB TECHNOLOGIST PROVIDED HISTORY: Reason for exam:->SOB FINDINGS: Cardiomegaly. Mild right hilar prominence could be vascular. Right lung is grossly clear. There multifocal parenchymal opacity involving left lung worrisome for developing pneumonia versus interstitial alveolar infiltrate/edema. No effusion. No pneumothorax. Left-sided airspace disease. This is worrisome for developing pneumonia versus interstitial alveolar edema. At minimum, consider follow-up imaging to document resolution following medical treatment course.                 RENAL COMPLETE [[id number]] Collected: 09/27/21 2223      Order Status: Completed Updated: 09/27/21 2228     Narrative:       EXAMINATION:   RETROPERITONEAL ULTRASOUND OF THE KIDNEYS AND URINARY BLADDER     9/27/2021     COMPARISON:   None     HISTORY:   ORDERING SYSTEM PROVIDED HISTORY: ASHWINI   TECHNOLOGIST PROVIDED HISTORY:     Reason for exam:->ASHWINI     FINDINGS:     Kidneys: The right kidney measures 10 cm in length and the left kidney measures 10.8   cm in length. Kidneys demonstrate mildly increased cortical echogenicity exaggerating the   corticomedullary differentiation.  Cortical thickness is maintained. Karsten Leyden   evidence of hydronephrosis or intrarenal stones. Bladder:     Urinary bladder is collapsed with a catheter present. .      Impression:       Mildly increased renal cortical echogenicity but the cortical thickness is   maintained.  No shadowing calculi or hydronephrosis. Lawernce Roughen

## 2021-09-29 NOTE — PROGRESS NOTES
Patient with large episode of urinary incontinence, he states that he accidentally had the urinal backwards and once he started going he couldn't stop, entire bed pad is soaked as are his sheets. Patient bathed at this time and bed linnean changed.

## 2021-09-29 NOTE — PROGRESS NOTES
PULMONARY AND CRITICAL CARE MEDICINE PROGRESS NOTE    Subjective: Patient is up to 70% FiO2 with 45 L/min flow. He saturating 94%. He is eating his breakfast without any respiratory distress. Wants Mahajan catheter to be removed. REVIEW OF SYSTEMS:   Constitutional symptoms: The patient denies fever, fatigue, night sweats, weight loss or weight gain. HEENT: No vision changes. No tinnitus, Denies sinus pain. No hoarseness, or dysphagia. Neck: Patient denies swelling in the neck. Cardiovascular: Denies chest pain, palpitation, syncope. Respiratory: Denies shortness of breath or cough. Gastrointestinal: Denies nausea, abdominal pain or change in bowel function. Genitourinary: Denies obstructive symptoms. No history of incontinence. Skin: No rashes or itching. Muskuloskeletal: Denies weakness or bone pain. Neurological: No headaches or seizures. Psychiatric: Denies mood swings or depression.      MEDICATIONS:     Scheduled Meds:   insulin lispro  0-12 Units SubCUTAneous TID WC    insulin lispro  0-6 Units SubCUTAneous Nightly    [START ON 9/30/2021] remdesivir IVPB  100 mg IntraVENous Q24H    enoxaparin  1 mg/kg SubCUTAneous BID    furosemide  20 mg IntraVENous BID    sodium chloride flush  5-40 mL IntraVENous 2 times per day    famotidine  20 mg Oral BID    Vitamin D  2,000 Units Oral Daily    dexamethasone  20 mg IntraVENous Daily       Current Infusions:    sodium chloride 25 mL (09/29/21 1017)    dextrose         PRN meds:  hydrALAZINE, oxyCODONE-acetaminophen, polyvinyl alcohol, morphine, sodium chloride flush, sodium chloride, aluminum & magnesium hydroxide-simethicone, ondansetron **OR** ondansetron, polyethylene glycol, acetaminophen **OR** acetaminophen, guaiFENesin-dextromethorphan, glucose, dextrose, glucagon (rDNA), dextrose    PHYSICAL EXAM:  BP (!) 125/98   Pulse 75   Temp 96.7 °F (35.9 °C) (Oral)   Resp 16   Ht 5' 7.5\" (1.715 m)   Wt 197 lb 4.8 oz (89.5 kg) [id number] 38.4   PO2ART 56.2*   CER1YXN 27.7   R3PJACMW 91.0*   BEART 3.8*     Imaging:  I reviewed the chest x-ray from 9/24/2021 and my interpretation is as follows. Bilateral multifocal infiltrates. IMPRESSION:  1. Acute hypoxemic respiratory failure  2. COVID-19 pneumonia  3. Hypercoagulable state  4. Acute kidney injury, resolved      PLAN:  Patient is on 70% FiO2 with 45 L/min flow. Saturating well. No respiratory distress. Eating breakfast this morning. On Decadron 20 mg IV daily. Day 5/10 of remdesivir. Also received 1 dose of Actemra. CRP is downtrending. Patient was noted to have elevated D-dimer for which she was initiated on heparin drip in the setting of acute kidney injury. D-dimer is down to 299 now. Can be switched to therapeutic Lovenox as creatinine has normalized. Leukocytosis is improving. Encourage prone positioning in the bed as tolerated. Out of bed in chair. Critical Care Time: 39 Minutes  Total critical care time caring for this patient with life threatening illness, including direct patient contact, management of life support systems, review of data including imaging and labs, discussions with other team members and physicians excluding procedures. Willy Herzog MD  Pulmonary Critical Care and Sleep Medicine  9/29/2021, 1:34 PM    This note was completed using dragon medical speech recognition software. Grammatical errors, random word insertions, pronoun errors and incomplete sentences are occasional consequences of this technology due to software limitations. If there are questions or concerns about the content of this note of information contained within the body of this dictation they should be addressed with the provider for clarification.

## 2021-09-29 NOTE — PROGRESS NOTES
[facility] PROGRESS NOTE    9/29/2021 8:56 AM        Name: Kp Ashley .               Admitted: 9/24/2021  Primary Care Provider: Akilah Bond MD (Tel: [phone])                        Interval history:  Patient seen and examined today  Overnight events, interval ancillary notes and labs reviewed  Transferred to the ICU due to increased oxygen requirement; remains on airvo 93% FiO2 @60 L/min satting in b/w 90 - 93%  Endorsed shortness of breath but denied any chest pain, fever, chills, palpitations, dizziness nausea vomiting abdominal  On day 5/10 of remdesivir; status post 1 dose of Actemra  CRP and D-dimer trended down  Heparin drip discontinued; switched Lovenox      Current Medications  insulin lispro (1 Unit Dial) 0-12 Units, TID WC  insulin lispro (1 Unit Dial) 0-6 Units, Nightly  furosemide (LASIX) injection 20 mg, BID  hydrALAZINE (APRESOLINE) injection 10 mg, Q6H PRN  oxyCODONE-acetaminophen (PERCOCET)  MG per tablet 1 tablet, Q6H PRN  polyvinyl alcohol (LIQUIFILM TEARS) 1.4 % ophthalmic solution 1 drop, Q4H PRN  morphine (PF) injection 2 mg, Q4H PRN  sodium chloride flush 0.9 % injection 5-40 mL, 2 times per day  sodium chloride flush 0.9 % injection 5-40 mL, PRN  0.9 % sodium chloride infusion, PRN  famotidine (PEPCID) tablet 20 mg, BID  aluminum & magnesium hydroxide-simethicone (MAALOX) 200-200-20 MG/5ML suspension 30 mL, Q6H PRN  ondansetron (ZOFRAN-ODT) disintegrating tablet 4 mg, Q8H PRN   Or  ondansetron (ZOFRAN) injection 4 mg, Q6H PRN  polyethylene glycol (GLYCOLAX) packet 17 g, Daily PRN  acetaminophen (TYLENOL) tablet 650 mg, Q6H PRN   Or  acetaminophen (TYLENOL) suppository 650 mg, Q6H PRN  guaiFENesin-dextromethorphan (ROBITUSSIN DM) 100-10 MG/5ML syrup 5 mL, Q4H PRN  Vitamin D (CHOLECALCIFEROL) tablet 2,000 Units, Daily  dexamethasone (DECADRON) 20 mg

## 2021-09-30 LAB
ANION GAP SERPL CALCULATED.3IONS-SCNC: 13 MMOL/L (ref 3–16)
APTT: 43.1 SEC (ref 26.2–38.6)
BUN BLDV-MCNC: 46 MG/DL (ref 7–20)
CALCIUM SERPL-MCNC: 9 MG/DL (ref 8.3–10.6)
CHLORIDE BLD-SCNC: 97 MMOL/L (ref 99–110)
CO2: 26 MMOL/L (ref 21–32)
CREAT SERPL-MCNC: 1.2 MG/DL (ref 0.8–1.3)
GFR AFRICAN AMERICAN: >60
GFR NON-AFRICAN AMERICAN: >60
GLUCOSE BLD-MCNC: 128 MG/DL (ref 70–99)
GLUCOSE BLD-MCNC: 130 MG/DL (ref 70–99)
GLUCOSE BLD-MCNC: 173 MG/DL (ref 70–99)
GLUCOSE BLD-MCNC: 239 MG/DL (ref 70–99)
GLUCOSE BLD-MCNC: 273 MG/DL (ref 70–99)
HCT VFR BLD CALC: 50.9 % (ref 40.5–52.5)
HEMOGLOBIN: 16.7 G/DL (ref 13.5–17.5)
MCH RBC QN AUTO: 26.9 PG (ref 26–34)
MCHC RBC AUTO-ENTMCNC: 32.9 G/DL (ref 31–36)
MCV RBC AUTO: 81.8 FL (ref 80–100)
PDW BLD-RTO: 14.9 % (ref 12.4–15.4)
PERFORMED ON: ABNORMAL
PLATELET # BLD: 307 K/UL (ref 135–450)
PMV BLD AUTO: 9 FL (ref 5–10.5)
POTASSIUM REFLEX MAGNESIUM: 4.7 MMOL/L (ref 3.5–5.1)
PROCALCITONIN: 0.09 NG/ML (ref 0–0.15)
RBC # BLD: 6.22 M/UL (ref 4.2–5.9)
SODIUM BLD-SCNC: 136 MMOL/L (ref 136–145)
WBC # BLD: 20.3 K/UL (ref 4–11)

## 2021-09-30 PROCEDURE — 6370000000 HC RX 637 (ALT 250 FOR IP): Performed by: HOSPITALIST

## 2021-09-30 PROCEDURE — 97166 OT EVAL MOD COMPLEX 45 MIN: CPT

## 2021-09-30 PROCEDURE — 6360000002 HC RX W HCPCS: Performed by: INTERNAL MEDICINE

## 2021-09-30 PROCEDURE — 2580000003 HC RX 258: Performed by: INTERNAL MEDICINE

## 2021-09-30 PROCEDURE — 97530 THERAPEUTIC ACTIVITIES: CPT

## 2021-09-30 PROCEDURE — 85730 THROMBOPLASTIN TIME PARTIAL: CPT

## 2021-09-30 PROCEDURE — 36415 COLL VENOUS BLD VENIPUNCTURE: CPT

## 2021-09-30 PROCEDURE — 84145 PROCALCITONIN (PCT): CPT

## 2021-09-30 PROCEDURE — 94761 N-INVAS EAR/PLS OXIMETRY MLT: CPT

## 2021-09-30 PROCEDURE — 6370000000 HC RX 637 (ALT 250 FOR IP): Performed by: INTERNAL MEDICINE

## 2021-09-30 PROCEDURE — 2060000000 HC ICU INTERMEDIATE R&B

## 2021-09-30 PROCEDURE — 97162 PT EVAL MOD COMPLEX 30 MIN: CPT

## 2021-09-30 PROCEDURE — 85027 COMPLETE CBC AUTOMATED: CPT

## 2021-09-30 PROCEDURE — 97535 SELF CARE MNGMENT TRAINING: CPT

## 2021-09-30 PROCEDURE — 6360000002 HC RX W HCPCS: Performed by: HOSPITALIST

## 2021-09-30 PROCEDURE — 2700000000 HC OXYGEN THERAPY PER DAY

## 2021-09-30 PROCEDURE — 80048 BASIC METABOLIC PNL TOTAL CA: CPT

## 2021-09-30 PROCEDURE — 2500000003 HC RX 250 WO HCPCS: Performed by: INTERNAL MEDICINE

## 2021-09-30 PROCEDURE — 99291 CRITICAL CARE FIRST HOUR: CPT | Performed by: INTERNAL MEDICINE

## 2021-09-30 RX ORDER — SENNA AND DOCUSATE SODIUM 50; 8.6 MG/1; MG/1
2 TABLET, FILM COATED ORAL DAILY PRN
Status: DISCONTINUED | OUTPATIENT
Start: 2021-09-30 | End: 2021-10-02 | Stop reason: HOSPADM

## 2021-09-30 RX ADMIN — FAMOTIDINE 20 MG: 20 TABLET, FILM COATED ORAL at 07:59

## 2021-09-30 RX ADMIN — OXYCODONE AND ACETAMINOPHEN 1 TABLET: 10; 325 TABLET ORAL at 04:33

## 2021-09-30 RX ADMIN — MORPHINE SULFATE 2 MG: 2 INJECTION, SOLUTION INTRAMUSCULAR; INTRAVENOUS at 23:37

## 2021-09-30 RX ADMIN — FUROSEMIDE 20 MG: 10 INJECTION, SOLUTION INTRAMUSCULAR; INTRAVENOUS at 08:00

## 2021-09-30 RX ADMIN — Medication 10 ML: at 08:00

## 2021-09-30 RX ADMIN — MORPHINE SULFATE 2 MG: 2 INJECTION, SOLUTION INTRAMUSCULAR; INTRAVENOUS at 19:32

## 2021-09-30 RX ADMIN — REMDESIVIR 100 MG: 100 INJECTION, POWDER, LYOPHILIZED, FOR SOLUTION INTRAVENOUS at 11:37

## 2021-09-30 RX ADMIN — INSULIN LISPRO 2 UNITS: 100 INJECTION, SOLUTION INTRAVENOUS; SUBCUTANEOUS at 20:41

## 2021-09-30 RX ADMIN — DOCUSATE SODIUM 50 MG AND SENNOSIDES 8.6 MG 2 TABLET: 8.6; 5 TABLET, FILM COATED ORAL at 14:27

## 2021-09-30 RX ADMIN — Medication 10 ML: at 20:42

## 2021-09-30 RX ADMIN — INSULIN LISPRO 2 UNITS: 100 INJECTION, SOLUTION INTRAVENOUS; SUBCUTANEOUS at 11:37

## 2021-09-30 RX ADMIN — DEXAMETHASONE SODIUM PHOSPHATE 20 MG: 4 INJECTION, SOLUTION INTRA-ARTICULAR; INTRALESIONAL; INTRAMUSCULAR; INTRAVENOUS; SOFT TISSUE at 10:41

## 2021-09-30 RX ADMIN — MORPHINE SULFATE 2 MG: 2 INJECTION, SOLUTION INTRAMUSCULAR; INTRAVENOUS at 08:00

## 2021-09-30 RX ADMIN — ENOXAPARIN SODIUM 90 MG: 100 INJECTION SUBCUTANEOUS at 08:00

## 2021-09-30 RX ADMIN — INSULIN LISPRO 6 UNITS: 100 INJECTION, SOLUTION INTRAVENOUS; SUBCUTANEOUS at 17:57

## 2021-09-30 RX ADMIN — OXYCODONE AND ACETAMINOPHEN 1 TABLET: 10; 325 TABLET ORAL at 10:36

## 2021-09-30 RX ADMIN — POLYETHYLENE GLYCOL 3350 17 G: 17 POWDER, FOR SOLUTION ORAL at 07:59

## 2021-09-30 RX ADMIN — FAMOTIDINE 20 MG: 20 TABLET, FILM COATED ORAL at 20:41

## 2021-09-30 RX ADMIN — FUROSEMIDE 20 MG: 10 INJECTION, SOLUTION INTRAMUSCULAR; INTRAVENOUS at 17:59

## 2021-09-30 RX ADMIN — OXYCODONE AND ACETAMINOPHEN 1 TABLET: 10; 325 TABLET ORAL at 18:06

## 2021-09-30 RX ADMIN — Medication 2000 UNITS: at 07:59

## 2021-09-30 RX ADMIN — ENOXAPARIN SODIUM 90 MG: 100 INJECTION SUBCUTANEOUS at 20:41

## 2021-09-30 RX ADMIN — SODIUM CHLORIDE 100 ML: 9 INJECTION, SOLUTION INTRAVENOUS at 10:40

## 2021-09-30 ASSESSMENT — PAIN DESCRIPTION - FREQUENCY
FREQUENCY: CONTINUOUS

## 2021-09-30 ASSESSMENT — PAIN SCALES - GENERAL
PAINLEVEL_OUTOF10: 7
PAINLEVEL_OUTOF10: 10
PAINLEVEL_OUTOF10: 8
PAINLEVEL_OUTOF10: 9
PAINLEVEL_OUTOF10: 8
PAINLEVEL_OUTOF10: 2
PAINLEVEL_OUTOF10: 0
PAINLEVEL_OUTOF10: 9
PAINLEVEL_OUTOF10: 8
PAINLEVEL_OUTOF10: 0
PAINLEVEL_OUTOF10: 8
PAINLEVEL_OUTOF10: 0
PAINLEVEL_OUTOF10: 7
PAINLEVEL_OUTOF10: 8

## 2021-09-30 ASSESSMENT — PAIN DESCRIPTION - ORIENTATION
ORIENTATION: LEFT
ORIENTATION: LOWER;RIGHT
ORIENTATION: RIGHT
ORIENTATION: LEFT
ORIENTATION: RIGHT

## 2021-09-30 ASSESSMENT — PAIN DESCRIPTION - DESCRIPTORS
DESCRIPTORS: ACHING;SHARP
DESCRIPTORS: ACHING
DESCRIPTORS: ACHING;SHARP
DESCRIPTORS: DULL;ACHING
DESCRIPTORS: ACHING

## 2021-09-30 ASSESSMENT — PAIN DESCRIPTION - LOCATION
LOCATION: GROIN
LOCATION: BACK
LOCATION: GROIN

## 2021-09-30 ASSESSMENT — PAIN DESCRIPTION - PAIN TYPE
TYPE: CHRONIC PAIN

## 2021-09-30 ASSESSMENT — PAIN DESCRIPTION - ONSET: ONSET: ON-GOING

## 2021-09-30 NOTE — PROGRESS NOTES
Physical Therapy    Facility/Department: [facility]  Initial Assessment    NAME: Nancy Lauren  : 1960  MRN: [id number]    Date of Service: 2021    Discharge Recommendations:  Nancy Lauren scored a 16/24 on the AM-PAC short mobility form. Current research shows that an AM-PAC score of 17 or less is typically not associated with a discharge to the patient's home setting. Based on the patient's AM-PAC score and their current functional mobility deficits, it is recommended that the patient have 3-5 sessions per week of Physical Therapy at d/c to increase the patient's independence. Please see assessment section for further patient specific details. If patient discharges prior to next session this note will serve as a discharge summary. Please see below for the latest assessment towards goals. 3-5 sessions per week   PT Equipment Recommendations  Equipment Needed: No    Assessment   Body structures, Functions, Activity limitations: Decreased functional mobility ; Decreased endurance;Decreased balance;Decreased safe awareness  Assessment: Patient not at baseline function and would benefit from skilled PT to address above deficits and facilitate return to baseline function.  Patient not safe to return to current home environment due to decreased safety awareness and inability to ambulate functional household distances due to significant O2 demand  Treatment Diagnosis: decreased functional mobility, impaired gait, decreased balance  Prognosis: Good  Decision Making: Medium Complexity  Clinical Presentation: evolving  PT Education: Goals;PT Role;Plan of Care  Patient Education: verbalized understanding  Barriers to Learning: cognitive  REQUIRES PT FOLLOW UP: Yes  Activity Tolerance  Activity Tolerance: Patient limited by endurance  Activity Tolerance: able to continue with frequent seated rest breaks       Patient Diagnosis(es): The primary encounter diagnosis was Acute respiratory failure with hypoxemia [facility]). Diagnoses of Community acquired pneumonia of left lung, unspecified part of lung and ASHWINI (acute kidney injury) ([facility] [address] were also pertinent to this visit. has a past medical history of Anxiety, GERD (gastroesophageal reflux disease), Heartburn, and Hypertension. has a past surgical history that includes hernia repair and Testicle surgery. Restrictions  Restrictions/Precautions  Restrictions/Precautions: Fall Risk, Isolation  Required Braces or Orthoses?: No  Position Activity Restriction  Other position/activity restrictions: This is a pleasant 57 y.o. male who is unvaccinated for COVID-19, with history of anxiety disorder, gastroesophageal reflux disease, peptic ulcer disease, essential hypertension, obesity with BMI of 30.86 kg/m², who presents to the emergency room with complaints of generalized weakness, worsening shortness of breath, body aches, cough, ongoing for the past 3 weeks. He has had diarrhea. He has had exposure to multiple individuals with COVID-19 infection. Vision/Hearing  Vision: Within Functional Limits  Hearing: Within functional limits     Subjective  General  Chart Reviewed: Yes  Patient assessed for rehabilitation services?: Yes  Family / Caregiver Present: No  Diagnosis: PNA due to COVID  Follows Commands: Within Functional Limits  General Comment  Comments: supine in bed upon arrival with alarm on  Subjective  Subjective: Reports generalized discomfort but denies pain. Agreeable to therapy.  Confusion noted  Pain Screening  Patient Currently in Pain: Denies          Orientation  Orientation  Overall Orientation Status: Within Functional Limits  Social/Functional History  Social/Functional History  Lives With: Alone  Type of Home: House  Home Layout: Two level, Bed/Bath upstairs, 1/2 bath on main level (reports no handrails on steps)  Bathroom Shower/Tub: Tub only  ADL Assistance: Independent  Homemaking Assistance: Independent  Ambulation Assistance: Independent  Transfer Assistance: Independent  Active : Yes  Additional Comments: Patient poor historian, unsure of accuracy of information. Reports several near falls in bathroom, reports he needs help getting his house in order, reports decreased ability to take care of house when sick prior to admission, spoiled food in fridge, bowel incontinence in bathroom etc, reports cannot use sink in bathroom -washing hands in bathtib    Objective          AROM RLE (degrees)  RLE AROM: WFL  AROM LLE (degrees)  LLE AROM : WFL  Strength RLE  Strength RLE: WFL  Strength LLE  Strength LLE: WFL           Transfers  Sit to Stand: Stand by assistance (from bed x 3)  Stand to sit: Stand by assistance  Ambulation  Ambulation?: Yes  Ambulation 1  Surface: level tile  Device: No Device  Other Apparatus: O2 (heated high flow FiO2 75%, 25L O2)  Quality of Gait: steady gait, decreased giuliana  Distance: 4' bed to chair  Comments: SpO2 84% on heated high flow following transfers- increased to 93% in less than 1 minute seated rest break. Complaining of ear discomfort. Redness noted on ears from O2 strap nursing alerted- respiratory present at end of session and placed patient on high flow. Patient reported discomfort reduced     Balance  Sitting - Static: Good  Sitting - Dynamic: Good  Standing - Static: Fair (static standing x 1 minute x 2 trials with SBA)  Standing - Dynamic: Fair  Exercises  Hip Flexion: seated marching x 10 bilaterally  Knee Short Arc Quad: seated x 10 bilaterally     Plan   Plan  Times per week: 3-5  Times per day: Daily  Current Treatment Recommendations: Functional Mobility Training, Strengthening, Transfer Training, Balance Training, Endurance Training, Gait Training, Safety Education & Training, Patient/Caregiver Education & Training, Stair training, Home Exercise Program  Safety Devices  Type of devices:  All fall risk precautions in place, Call light within reach, Chair alarm in place, Nurse notified, Patient at risk for falls, Left in chair, Gait belt  Restraints  Initially in place: No    AM-PAC Score  AM-PAC Inpatient Mobility Raw Score : 16 (09/30/21 1619)  AM-PAC Inpatient T-Scale Score : 40.78 (09/30/21 1619)  Mobility Inpatient CMS 0-100% Score: 54.16 (09/30/21 1619)  Mobility Inpatient CMS G-Code Modifier : CK (09/30/21 1619)          Goals  Short term goals  Time Frame for Short term goals:  To be met prior to discharge  Short term goal 1: Bed mobility with mod I  Short term goal 2: Sit to/from stand with supervision  Short term goal 3: Ambulate 48' with/without AD and supervision  Short term goal 4: Navigate up/down 4 steps with rail and min A       Therapy Time   Individual Concurrent Group Co-treatment   Time In 1444         Time Out 1542         Minutes 58         Timed Code Treatment Minutes: [address] [facility] Monserrat, PT    Thanks, Moraima Davison, PT, DPT [id number]

## 2021-09-30 NOTE — PROGRESS NOTES
[facility]ICINE PROGRESS NOTE    Subjective: Patient has been weaned down to 70% FiO2 with 25 L/min flow. No respiratory distress. REVIEW OF SYSTEMS:   Constitutional symptoms: The patient denies fever, fatigue, night sweats, weight loss or weight gain. HEENT: No vision changes. No tinnitus, Denies sinus pain. No hoarseness, or dysphagia. Neck: Patient denies swelling in the neck. Cardiovascular: Denies chest pain, palpitation, syncope. Respiratory: Denies shortness of breath or cough. Gastrointestinal: Denies nausea, abdominal pain or change in bowel function. Genitourinary: Denies obstructive symptoms. No history of incontinence. Skin: No rashes or itching. Muskuloskeletal: Denies weakness or bone pain. Neurological: No headaches or seizures. Psychiatric: Denies mood swings or depression. MEDICATIONS:     Scheduled Meds:   insulin lispro  0-12 Units SubCUTAneous TID WC    insulin lispro  0-6 Units SubCUTAneous Nightly    remdesivir IVPB  100 mg IntraVENous Q24H    enoxaparin  1 mg/kg SubCUTAneous BID    furosemide  20 mg IntraVENous BID    sodium chloride flush  5-40 mL IntraVENous 2 times per day    famotidine  20 mg Oral BID    Vitamin D  2,000 Units Oral Daily    dexamethasone  20 mg IntraVENous Daily       Current Infusions:    sodium chloride 10 mL/hr at 09/30/21 1132    dextrose         PRN meds:  sennosides-docusate sodium, hydrALAZINE, oxyCODONE-acetaminophen, polyvinyl alcohol, morphine, sodium chloride flush, sodium chloride, aluminum & magnesium hydroxide-simethicone, ondansetron **OR** ondansetron, polyethylene glycol, acetaminophen **OR** acetaminophen, guaiFENesin-dextromethorphan, glucose, dextrose, glucagon (rDNA), dextrose    PHYSICAL EXAM:  BP (!) 119/91   Pulse 84   Temp 96.5 °F (35.8 °C) (Temporal)   Resp 15   Ht 5' 7.5\" (1.715 m)   Wt 197 lb 9.6 oz (89.6 kg)   SpO2 93%   BMI 30.49 kg/m²  I/O last 3 completed shifts:   In: reviewed the chest x-ray from 9/24/2021 and my interpretation is as follows. Bilateral multifocal infiltrates.        IMPRESSION:  1. Acute hypoxemic respiratory failure  2. COVID-19 pneumonia  3. Hypercoagulable state  4. Acute kidney injury, resolved        PLAN:  Patient is on 70% FiO2 with 25 L/min flow. Saturating well. No respiratory distress. On Decadron 20 mg IV daily. Day 6/10 of remdesivir. Also received 1 dose of Actemra. CRP is downtrending. Patient was noted to have elevated D-dimer for which she was initiated on heparin drip in the setting of acute kidney injury. D-dimer is down to 299 now. Now on therapeutic Lovenox as creatinine has normalized. Leukocytosis. Negative procalcitonin. Encourage prone positioning in the bed as tolerated. Out of bed in chair. CRP and D-dimer in the morning.       Critical Care Time: 30 Minutes  Total critical care time caring for this patient with life threatening illness, including direct patient contact, management of life support systems, review of data including imaging and labs, discussions with other team members and physicians excluding procedures. Clifford Reina MD  Pulmonary Critical Care and Sleep Medicine  9/30/2021, 1:36 PM    This note was completed using dragon medical speech recognition software. Grammatical errors, random word insertions, pronoun errors and incomplete sentences are occasional consequences of this technology due to software limitations. If there are questions or concerns about the content of this note of information contained within the body of this dictation they should be addressed with the provider for clarification.

## 2021-09-30 NOTE — PROGRESS NOTES
Shift assessment completed, jc is awake, alert and oriented complains of pain 8/10 prn morphine given per   [facility], has not had BM since 9/25 miralax given again today, plan to wean O2 down to HFNC get up to chair and have a BM today. Fall precautions in place, hourly rounding, call light and belongings in reach, bed in lowest position, wheels locked in place, side rails up x 2, walkways free of clutter, bed alarm on.

## 2021-09-30 NOTE — PROGRESS NOTES
Pt assessment complete. VSS. Meds given per MAR. Pt rates pain 9/10 but denies wanting any pain med at this time. Pt states needing help with urinal, but when asked what he needs help with Pt will not specify confusion then tells RN he doesn't need to urinate now, but needs help. RN encourages Pt to call out with any needs. Call light within reach, will continue to monitor.

## 2021-09-30 NOTE — PLAN OF CARE
Problem: Falls - Risk of:  Goal: Will remain free from falls  Description: Will remain free from falls  Outcome: Ongoing  Goal: Absence of physical injury  Description: Absence of physical injury  Outcome: Ongoing     Problem: Pain:  Goal: Pain level will decrease  Description: Pain level will decrease  Outcome: Ongoing  Goal: Control of acute pain  Description: Control of acute pain  Outcome: Ongoing  Goal: Control of chronic pain  Description: Control of chronic pain  Outcome: Ongoing     Problem: Airway Clearance - Ineffective  Goal: Achieve or maintain patent airway  Outcome: Ongoing     Problem: Gas Exchange - Impaired  Goal: Absence of hypoxia  Outcome: Ongoing  Goal: Promote optimal lung function  Outcome: Ongoing     Problem: Breathing Pattern - Ineffective  Goal: Ability to achieve and maintain a regular respiratory rate  Outcome: Ongoing     Problem:  Body Temperature -  Risk of, Imbalanced  Goal: Ability to maintain a body temperature within defined limits  Outcome: Ongoing  Goal: Will regain or maintain usual level of consciousness  Outcome: Ongoing  Goal: Complications related to the disease process, condition or treatment will be avoided or minimized  Outcome: Ongoing     Problem: Isolation Precautions - Risk of Spread of Infection  Goal: Prevent transmission of infection  Outcome: Ongoing     Problem: Nutrition Deficits  Goal: Optimize nutritional status  Outcome: Ongoing     Problem: Risk for Fluid Volume Deficit  Goal: Maintain normal heart rhythm  Outcome: Ongoing  Goal: Maintain absence of muscle cramping  Outcome: Ongoing  Goal: Maintain normal serum potassium, sodium, calcium, phosphorus, and pH  Outcome: Ongoing     Problem: Loneliness or Risk for Loneliness  Goal: Demonstrate positive use of time alone when socialization is not possible  Outcome: Ongoing     Problem: Fatigue  Goal: Verbalize increase energy and improved vitality  Outcome: Ongoing     Problem: Patient Education: Go to Patient Education Activity  Goal: Patient/Family Education  Outcome: Ongoing     Problem: Nutrition  Goal: Optimal nutrition therapy  Outcome: Ongoing

## 2021-09-30 NOTE — PROGRESS NOTES
[facility] PROGRESS NOTE    9/30/2021 8:20 AM        Name: Barbie Patel .               Admitted: 9/24/2021  Primary Care Provider: Cory Chase MD (Tel: [phone])                        Interval history:  Patient seen and examined today  Overnight events, interval ancillary notes and labs reviewed  Afebrile overnight, WBCs elevated likely secondary to steroids  On day 6/10 of remdesivir; status post 1 dose of Actemra  O2 requirement slightly improved: on airvo 70% [email or web address] L/min satting around 93%      Current Medications  insulin lispro (1 Unit Dial) 0-12 Units, TID WC  insulin lispro (1 Unit Dial) 0-6 Units, Nightly  remdesivir 100 mg in sodium chloride 0.9 % 250 mL IVPB, Q24H  enoxaparin (LOVENOX) injection 90 mg, BID  furosemide (LASIX) injection 20 mg, BID  hydrALAZINE (APRESOLINE) injection 10 mg, Q6H PRN  oxyCODONE-acetaminophen (PERCOCET)  MG per tablet 1 tablet, Q6H PRN  polyvinyl alcohol (LIQUIFILM TEARS) 1.4 % ophthalmic solution 1 drop, Q4H PRN  morphine (PF) injection 2 mg, Q4H PRN  sodium chloride flush 0.9 % injection 5-40 mL, 2 times per day  sodium chloride flush 0.9 % injection 5-40 mL, PRN  0.9 % sodium chloride infusion, PRN  famotidine (PEPCID) tablet 20 mg, BID  aluminum & magnesium hydroxide-simethicone (MAALOX) 200-200-20 MG/5ML suspension 30 mL, Q6H PRN  ondansetron (ZOFRAN-ODT) disintegrating tablet 4 mg, Q8H PRN   Or  ondansetron (ZOFRAN) injection 4 mg, Q6H PRN  polyethylene glycol (GLYCOLAX) packet 17 g, Daily PRN  acetaminophen (TYLENOL) tablet 650 mg, Q6H PRN   Or  acetaminophen (TYLENOL) suppository 650 mg, Q6H PRN  guaiFENesin-dextromethorphan (ROBITUSSIN DM) 100-10 MG/5ML syrup 5 mL, Q4H PRN  Vitamin D (CHOLECALCIFEROL) tablet 2,000 Units, Daily  dexamethasone (DECADRON) 20 mg in sodium chloride 0.9 % IVPB, Daily  glucose (GLUTOSE) 40 % oral gel 15 g, PRN  dextrose 50 % IV solution, PRN  glucagon (rDNA) injection 1 mg, PRN  dextrose 5 % solution, PRN        Objective:  BP (!) 150/104   Pulse 83   Temp 97.6 °F (36.4 °C) (Temporal)   Resp 20   Ht 5' 7.5\" (1.715 m)   Wt 197 lb 9.6 oz (89.6 kg)   SpO2 95%   BMI 30.49 kg/m²     Intake/Output Summary (Last 24 hours) at 9/30/2021 0820  Last data filed at 9/30/2021 0800  Gross per 24 hour   Intake 2005.64 ml   Output 1550 ml   Net 455.64 ml      Wt Readings from Last 3 Encounters:   09/30/21 197 lb 9.6 oz (89.6 kg)   09/30/15 208 lb 12.8 oz (94.7 kg)   07/17/14 195 lb (88.5 kg)       General appearance:  Appears comfortable  Eyes: Sclera clear. Pupils equal.  ENT: Moist oral mucosa. Trachea midline, no adenopathy. Cardiovascular: Regular rhythm, normal S1, S2. No murmur. No edema in lower extremities  Respiratory: Not using accessory muscles. Good inspiratory effort. Clear to auscultation bilaterally, no wheeze or crackles. GI: Abdomen soft, no tenderness, not distended, normal bowel sounds  Musculoskeletal: No cyanosis in digits, neck supple  Neurology: CN 2-12 grossly intact. No speech or motor deficits  Psych: Normal affect.  Alert and oriented in time, place and person  Skin: Warm, dry, normal turgor    Labs and Tests:  CBC:   Recent Labs     09/28/21 0449 09/29/21  0520 09/30/21  0434   WBC 18.5* 16.6* 20.3*   HGB 15.0 15.7 16.7    276 307     BMP:    Recent Labs     09/28/21 0449 09/29/21  0520 09/30/21  0434   * 135* 136   K 4.3 4.1 4.7   CL 96* 97* 97*   CO2 26 27 26   BUN 50* 40* 46*   CREATININE 1.4* 1.1 1.2   GLUCOSE 160* 133* 130*     Hepatic:   Recent Labs     09/28/21 0449 09/29/21  0520   AST 74* 29   ALT 91* 65*   BILITOT 0.4 0.5   ALKPHOS 85 81       Discussed care with patient       Spent 30  minutes with patient and family at bedside and on unit reviewing medical records and labs, spent greater than 50% time counseling patient and family on diagnosis and plan Problem List  Principal Problem:    Pneumonia due to COVID-19 virus  Active Problems:    HTN (hypertension)    GERD (gastroesophageal reflux disease)    Acute respiratory failure with hypoxia (HCC)    ASHWINI (acute kidney injury) ([address]    Hyponatremia    Anxiety    Obesity (BMI 30-39. 9)  Resolved Problems:    * No resolved hospital problems. *       Assessment & Plan:       Acute hypoxic respiratory failure secondary to Covid pneumonia; worsening transferred to ICU  Chest x-ray showed multifocal airspace disease on admission  Procalcitonin mildly elevated  Pulmonology on board appreciate their recs  Repeat chest x-ray showed multifocal airspace disease similar to previous/R  Continue supplemental oxygen keep sats above 90%  Status post Actemra; Continue Decadron and remdesivir  CRP trending down  Continue anticoagulation for hypercoagulable state    ASHWINI: Creatinine elevated 2.1 on admission; trending down  Nephrology on board appreciate their recs  Renal ultrasound and negative for hydronephrosis or calculi  Avoid nephrotoxin including home lisinopril  Output and daily weights    Hyponatremia: Improved    Anion gap acidosis: Resolved      Diet: ADULT DIET;  Regular  Adult Oral Nutrition Supplement; Standard High Calorie/High Protein Oral Supplement  Code:Full Code  DVT PPX lovenox       Winsome Ying MD   9/30/2021 8:20 AM

## 2021-09-30 NOTE — PLAN OF CARE
Aftercare following surgery of the circulatory system Problem: Falls - Risk of:  Goal: Will remain free from falls  Description: Will remain free from falls  9/30/2021 1353 by Luana Sauceda RN  Outcome: Ongoing  9/30/2021 0623 by Otto Quintana RN  Outcome: Ongoing  Goal: Absence of physical injury  Description: Absence of physical injury  9/30/2021 1353 by Luana Sauceda RN  Outcome: Ongoing  9/30/2021 0623 by Otto Quintana RN  Outcome: Ongoing     Problem: Pain:  Goal: Pain level will decrease  Description: Pain level will decrease  9/30/2021 1353 by Luana Sauceda RN  Outcome: Ongoing  9/30/2021 0623 by Otto Quintana RN  Outcome: Ongoing  Goal: Control of acute pain  Description: Control of acute pain  9/30/2021 1353 by Luana Sauceda RN  Outcome: Ongoing  9/30/2021 0623 by Otto Quintana RN  Outcome: Ongoing  Goal: Control of chronic pain  Description: Control of chronic pain  9/30/2021 1353 by Luana Suaceda RN  Outcome: Ongoing  9/30/2021 0623 by Otto Quintana RN  Outcome: Ongoing     Problem: Airway Clearance - Ineffective  Goal: Achieve or maintain patent airway  9/30/2021 1353 by Luana Sauceda RN  Outcome: Ongoing  9/30/2021 0623 by Otto Quintana RN  Outcome: Ongoing     Problem: Gas Exchange - Impaired  Goal: Absence of hypoxia  9/30/2021 1353 by Luana Sauceda RN  Outcome: Ongoing  9/30/2021 0623 by Otto Quintana RN  Outcome: Ongoing  Goal: Promote optimal lung function  9/30/2021 1353 by Luana Sauceda RN  Outcome: Ongoing  9/30/2021 0623 by Otto Quintana RN  Outcome: Ongoing     Problem: Breathing Pattern - Ineffective  Goal: Ability to achieve and maintain a regular respiratory rate  9/30/2021 1353 by Luana Sauceda RN  Outcome: Ongoing  9/30/2021 0623 by Otto Quintana RN  Outcome: Ongoing     Problem:  Body Temperature -  Risk of, Imbalanced  Goal: Ability to maintain a body temperature within defined limits  9/30/2021 1353 by Luana Sauceda RN  Outcome: Ongoing  9/30/2021 0623 by Otto Quintana RN  Outcome: Ongoing  Goal: Will regain or maintain usual level of consciousness  9/30/2021 1353 by Cristina Renee RN  Outcome: Ongoing  9/30/2021 0623 by Shelia Hernandez RN  Outcome: Ongoing  Goal: Complications related to the disease process, condition or treatment will be avoided or minimized  9/30/2021 1353 by Cristina Renee RN  Outcome: Ongoing  9/30/2021 0623 by Shelia Hernandez RN  Outcome: Ongoing

## 2021-09-30 NOTE — PROGRESS NOTES
Comprehensive Nutrition Assessment    Type and Reason for Visit:  Reassess    Nutrition Recommendations/Plan:   Change ONS from Ensure to magic cup & encourage po intake. Bowel regimen per pharmacy. Nutrition Assessment:  Pt continues with poor appetite, with po intake 26-50% of meals consumed per RN. Pt dislikes Ensure but is willing to try magic cups TID. Poor intake likely related to feeling poorly from COVID, however also noted pt with no BM since 9/25. Will advise pharmacy for bowel regimen. Will monitor for on-going improvement in po & supp intake. Malnutrition Assessment:  Malnutrition Status: At risk for malnutrition (Comment)    Context:  Acute Illness       Estimated Daily Nutrient Needs:  Energy (kcal):  9434-6957; Weight Used for Energy Requirements:   (con't using 92 kg)     Protein (g):   grams; Weight Used for Protein Requirements:  Ideal (69 kg; 1.2-1.5 grams per kg)        Fluid (ml/day):   ; Method Used for Fluid Requirements:  1 ml/kcal      Nutrition Related Findings:  No edema noted; -5L; LBM 9/25 per EMR      Wounds:  None       Current Nutrition Therapies:    ADULT DIET; Regular  Adult Oral Nutrition Supplement; Frozen Oral Supplement    Anthropometric Measures:  · Height: 5' 7.5\" (171.5 cm)  · Current Body Weight: 197 lb (89.4 kg)   · Ideal Body Weight: 151 lbs; % Ideal Body Weight 130.5 %   · BMI: 30.4  · Adjusted Body Weight:  ; No Adjustment   · BMI Categories: Obese Class 1 (BMI 30.0-34. 9)       Nutrition Diagnosis:   · Inadequate protein-energy intake related to inadequate protein-energy intake as evidenced by poor intake prior to admission, intake 26-50%      Nutrition Interventions:   Food and/or Nutrient Delivery:  Continue Current Diet, Modify Oral Nutrition Supplement  Nutrition Education/Counseling:  Education not indicated   Coordination of Nutrition Care:  Continue to monitor while inpatient    Goals:  Pt will consume at least 50% of meals and supplements Nutrition Monitoring and Evaluation:   Behavioral-Environmental Outcomes:  None Identified   Food/Nutrient Intake Outcomes:  Food and Nutrient Intake, Supplement Intake  Physical Signs/Symptoms Outcomes:  Weight, GI Status     Discharge Planning:     Too soon to determine     Electronically signed by Viry Solo RD, LD on 9/30/21 at 2:42 PM EDT    Contact: [phone]

## 2021-09-30 NOTE — PROGRESS NOTES
MD Cristian Schaeffer MD Rexene Sails, MD               Office: [phone]                      Fax: [phone]             [address] [place] [address]                   NEPHROLOGY INPATIENT PROGRESS NOTE:     PATIENT NAME: Shayne Reyes  : 1960  MRN: [id number]        RECOMMENDATIONS:   COVID-19 management: ongoing   - Rx: Lasix IV BID - continue to keep him as \"dry\" as possible  -ok for Actemra - no renal dosing   -ok for Ramdesivir : short course: 3-5 days. 100 mg   -IV Decadron   - might be causing azotemia   -Convalescent plasma not given  -A/C per protocol - to prevent renal microthrombi too   -Pneumonia antibiotic coverage, might need as elevated pro-calcitonin  -Hypoxia monitoring - severe needing HFNC - - some improvement. - hold home Lisinopril   - monitor PVR w/ bladder scan for jarvis insertion need. - no need for dialysis , but at higher risk for decompensation, needing closer monitoring. D/C plan from renal stand point:  - renally stable, but COVID hypoxia    D/W primary team - hospitalist  - Dr Anders Langston:       Admitted for:  ASHWINI (acute kidney injury) ([facility] [address] [N17.9]  Acute respiratory failure with hypoxemia ([facility] [address] [J96.01]  Community acquired pneumonia of left lung, unspecified part of lung [J18.9]  Pneumonia due to COVID-19 virus [U07.1, J12.82]      ASHWINI (on non -CKD: likely stage 2 ):    - BL Scr- 0.9 as off 2021 ---> 2.1 on admission  -: Etiology of ASHWINI - presumed clinically ATN   - other differentials: ?covid related nephropathy, unlikely GN / TI / TMA process   - UA : reviewed = fine cast = ATN   - Renal US - Mildly increased renal cortical echogenicity but the cortical thickness is   maintained.  No shadowing calculi or hydronephrosis. .     Associated problems:   Hyponatremia - moderate - d/to ASHWINI   Hypokalemia   Acidosis : high -AGMA , d/to ASHWINI, LA WNL    Other major problems: Management per primary and other consulting teams.   //   Acute Hypoxia respiratory failure- needing NIPPV   COVID (+) PNA      Hospital Problems         Last Modified POA    * (Principal) Pneumonia due to COVID-19 virus 9/25/2021 Yes    HTN (hypertension) 9/25/2021 Yes    GERD (gastroesophageal reflux disease) 9/25/2021 Yes    Acute respiratory failure with hypoxia ([facility] [address] 9/25/2021 Yes    ASHWINI (acute kidney injury) ([facility] [address] 9/25/2021 Yes    Hyponatremia 9/25/2021 Yes    Anxiety 9/25/2021 Yes    Obesity (BMI 30-39.9) 9/25/2021 Yes        : other supportive care :   - Check daily renal function panel with electrolytes-phosphorus  - Strict monitoring of I/Os, daily weight  - Renal feeds/diet  - Current medications reviewed. - Nephrotoxic medications have been discontinued. - Dose adjusted and appropriate. - Dose meds for eGFR <15 mL/min/1.73m2 during ASHWINI    - Avoid heavy opioids due to renal failure - may use very low dose dilaudid / fentanyl with close monitoring of CNS and respiratory depression. Please refer to the orders. High Complexity. Multiple complex problems. Discussed with patient 's treatment team   Time spent > 30 (~35) minutes. Thank you for allowing me to participate in this patient's care. Please do not hesitate to contact me anytime. We will follow along with you. Kiara Carlson MD,  [facility] [address] [place] [address]: [phone] or [address] [employer]  Fax: [phone]        ========================================================   ========================================================      This patient is COVID-19 positive, so in a strict isolation, as per current protocol; So in order to preserve PPE supply and to avoid unnecessary exposure to prevent transmission of COVID-19; this patient was NOT examined face to face, as risk of contact outweighs the benefits and likely would not change much of my clinical management.    HPI, review of system and physical exam was limited, as it was mainly obtained from chart review and the primary team. But all relevant records and diagnostic tests were reviewed, including laboratory and imaging results. Patient's care is being coordinated with the primary service. Subjective:  Seen via doors  COVID (+)  Hypoxia needing NIPPV , not improving  BP stable   Past medical, Surgical, Social, Family medical history reviewed by me. MEDICATIONS: reviewed by me. Medications Prior to Admission:  No current facility-administered medications on file prior to encounter. Current Outpatient Medications on File Prior to Encounter   Medication Sig Dispense Refill    oxyCODONE-acetaminophen (PERCOCET)  MG per tablet Take 1 tablet by mouth every 8 hours as needed for Pain.  lisinopril (PRINIVIL;ZESTRIL) 20 MG tablet TAKE 1 TABLET BY MOUTH DAILY 30 tablet 1    omeprazole (PRILOSEC) 20 MG capsule Take 20 mg by mouth daily      HYDROcodone-acetaminophen (NORCO) 5-325 MG per tablet Take 1 tablet by mouth 2 times daily      fluticasone (FLONASE) 50 MCG/ACT nasal spray 2 sprays by Nasal route daily      baclofen (LIORESAL) 10 MG tablet Take 10 mg by mouth 2 times daily as needed      DULoxetine (CYMBALTA) 30 MG capsule Take 1 capsule by mouth daily 30 capsule 3    ALPRAZolam (XANAX) 0.5 MG tablet Take 1 tablet by mouth 2 times daily as needed for Sleep 60 tablet 0    Melatonin 5 MG TABS tablet Take 1 tablet by mouth nightly as needed.            Current Facility-Administered Medications:     insulin lispro (1 Unit Dial) 0-12 Units, 0-12 Units, SubCUTAneous, TID WC, Jayro Michael MD, 2 Units at 09/29/21 1646    insulin lispro (1 Unit Dial) 0-6 Units, 0-6 Units, SubCUTAneous, Nightly, Jayro Michael MD, 1 Units at 09/29/21 2121    remdesivir 100 mg in sodium chloride 0.9 % 250 mL IVPB, 100 mg, IntraVENous, Q24H, Taurus Aguero MD    enoxaparin (LOVENOX) injection 90 mg, 1 mg/kg, SubCUTAneous, BID, Taurus Aguero MD, 90 mg at 09/30/21 0800    furosemide (LASIX) injection 20 mg, 20 mg, IntraVENous, BID, Karina Ibarra MD, 20 mg at 09/30/21 0800    hydrALAZINE (APRESOLINE) injection 10 mg, 10 mg, IntraVENous, Q6H PRN, Geo Quach MD, 10 mg at 09/28/21 2046    oxyCODONE-acetaminophen (PERCOCET)  MG per tablet 1 tablet, 1 tablet, Oral, Q6H PRN, Marlo Mohamud MD, 1 tablet at 09/30/21 0433    polyvinyl alcohol (LIQUIFILM TEARS) 1.4 % ophthalmic solution 1 drop, 1 drop, Both Eyes, Q4H PRN, Dave Pierson MD, 1 drop at 09/26/21 2021    morphine (PF) injection 2 mg, 2 mg, IntraVENous, Q4H PRN, Marlo Mohamud MD, 2 mg at 09/30/21 0800    sodium chloride flush 0.9 % injection 5-40 mL, 5-40 mL, IntraVENous, 2 times per day, Nadege Casey MD, 10 mL at 09/30/21 0800    sodium chloride flush 0.9 % injection 5-40 mL, 5-40 mL, IntraVENous, PRN, Terry Spann MD    0.9 % sodium chloride infusion, 25 mL, IntraVENous, PRN, Nadege Casey MD, Stopped at 09/29/21 1214    famotidine (PEPCID) tablet 20 mg, 20 mg, Oral, BID, Nadege Casey MD, 20 mg at 09/30/21 0759    aluminum & magnesium hydroxide-simethicone (MAALOX) 200-200-20 MG/5ML suspension 30 mL, 30 mL, Oral, Q6H PRN, Nadege Casey MD    ondansetron (ZOFRAN-ODT) disintegrating tablet 4 mg, 4 mg, Oral, Q8H PRN, 4 mg at 09/28/21 1029 **OR** ondansetron (ZOFRAN) injection 4 mg, 4 mg, IntraVENous, Q6H PRN, Nadege Casey MD, 4 mg at 09/27/21 1140    polyethylene glycol (GLYCOLAX) packet 17 g, 17 g, Oral, Daily PRN, Nadege Casey MD, 17 g at 09/30/21 0759    acetaminophen (TYLENOL) tablet 650 mg, 650 mg, Oral, Q6H PRN, 650 mg at 09/28/21 0913 **OR** acetaminophen (TYLENOL) suppository 650 mg, 650 mg, Rectal, Q6H PRN, Terry Spann MD    guaiFENesin-dextromethorphan (ROBITUSSIN DM) 100-10 MG/5ML syrup 5 mL, 5 mL, Oral, Q4H PRN, Nadege Casey MD    Vitamin D (CHOLECALCIFEROL) tablet 2,000 Units, 2,000 Units, Oral, Daily, Nadege Casey MD, 2,000 Units at 09/30/21 0759    dexamethasone (DECADRON) 20 mg in sodium chloride 0.9 % IVPB, 20 mg, IntraVENous, Daily, Shelton Crigler, MD, Stopped at 09/29/21 1047    glucose (GLUTOSE) 40 % oral gel 15 g, 15 g, Oral, PRN, Suzie Zayas MD    dextrose 50 % IV solution, 12.5 g, IntraVENous, PRN, Suzie Zayas MD    glucagon (rDNA) injection 1 mg, 1 mg, IntraMUSCular, PRN, Regine Covington MD    dextrose 5 % solution, 100 mL/hr, IntraVENous, PRN, Regine Covington MD    REVIEW OF SYSTEMS  Review of systems not obtained due to patient factors      PHYSICAL EXAM:  Recent vital signs and recent I/Os reviewed by me.      Wt Readings from Last 3 Encounters:   09/30/21 197 lb 9.6 oz (89.6 kg)   09/30/15 208 lb 12.8 oz (94.7 kg)   07/17/14 195 lb (88.5 kg)     BP Readings from Last 3 Encounters:   09/30/21 (!) 150/104   09/30/15 (!) 130/100   07/17/14 122/90     Patient Vitals for the past 24 hrs:   BP Temp Temp src Pulse Resp SpO2 Weight   09/30/21 0821 -- -- -- -- 20 96 % --   09/30/21 0758 (!) 150/104 97.6 °F (36.4 °C) Temporal 83 20 95 % --   09/30/21 0600 -- -- -- 88 -- -- --   09/30/21 0416 -- -- -- -- 18 93 % --   09/30/21 0358 -- -- -- 85 -- -- --   09/30/21 0353 (!) 136/91 96 °F (35.6 °C) Temporal 85 20 92 % 197 lb 9.6 oz (89.6 kg)   09/30/21 0200 -- -- -- 77 -- -- --   09/30/21 0100 (!) 116/91 96 °F (35.6 °C) Temporal 80 21 93 % --   09/30/21 0020 -- -- -- -- 22 96 % --   09/30/21 0000 -- -- -- 66 -- -- --   09/29/21 2200 -- -- -- 89 -- -- --   09/29/21 2151 -- -- -- -- 23 (!) 89 % --   09/29/21 2100 (!) 126/97 97 °F (36.1 °C) Temporal 99 19 90 % --   09/29/21 2000 -- -- -- 78 -- -- --   09/29/21 1732 -- -- -- -- -- 92 % --   09/29/21 1637 (!) 118/96 97.5 °F (36.4 °C) Temporal 87 20 90 % --   09/29/21 1630 -- -- -- -- -- (!) 89 % --   09/29/21 1615 -- -- -- -- -- (!) 88 % --   09/29/21 1553 -- -- -- -- 17 93 % --   09/29/21 1504 -- -- -- -- -- 91 % --   09/29/21 1200 (!) 125/98 96.7 °F (35.9 °C) Oral 75 16 95 % --   09/29/21 1158 -- -- -- -- 23 97 % --   09/29/21 1015 -- -- -- -- -- 94 % --       Intake/Output Summary (Last 24 hours) at 9/30/2021 0938  Last data filed at 9/30/2021 0800  Gross per 24 hour   Intake 2005.64 ml   Output 1550 ml   Net 455.64 ml          Physical exam:    In-person bedside physical examination deferred. Pursuant to the emergency declaration under the [facility], [address] [place] [address] and the [employer] Act, this clinical encounter was conducted to provide necessary medical care. (Also consistent with new provisions and guidance offered by [facility] on March 18, 2020 in setting of COVID 19 outbreak and in order to preserve personal protective equipment in accordance with the flexibilities announced by CMS on March 30, 2020)   References: https://[email or web address] [facility]/Portals/0/Resources/COVID-19/3_18%20Telemed%20Guidance%20Updated%20March%2018. pdf?[email or web address]                      https://[facility]. [facility]/Portals/0/Resources/COVID-19/3_18%20Telemed%20Guidance%20Updated%20March%2018. pdf?avn=[email or web address]                      [email or web address] pdf    However, I did visually inspect the patient and observed the following:      Vitals signs reviewed. Constitutional:       General: not in acute distress. Appearance:   ill-appearing. HENT:      Head: Normocephalic and atraumatic. Nose: Nose normal.      Mouth/Throat:      Mouth: Mucous membranes are moist.   Eyes:      General: No scleral icterus. Conjunctiva/sclera: Conjunctivae normal.   Neck:      Musculoskeletal: Normal range of motion and neck supple. Cardiovascular:      Rate and Rhythm: Normal rate. Pulmonary:      Effort: Pulmonary effort is ab-normal. On HFNC  Abdominal:      General: There is no distension. Musculoskeletal:      Right lower leg: edema. Left lower leg: edema.    Skin:     Coloration: Skin is not jaundiced. Neurological:      General: deferred          DATA:  Diagnostic tests reviewed by me for today's visit:   (AS NEEDED FOR MY EVALUATION AND MANAGEMENT). Recent Labs     09/28/21 0449 09/29/21  0520 09/30/21  0434   WBC 18.5* 16.6* 20.3*   HCT 44.2 46.1 50.9    276 307     Iron Saturation:  No components found for: PERCENTFE  FERRITIN:    Lab Results   Component Value Date    FERRITIN 3,230.0 09/25/2021     IRON:  No results found for: IRON  TIBC:  No results found for: TIBC    Recent Labs     09/28/21 0449 09/29/21  0520 09/30/21  0434   * 135* 136   K 4.3 4.1 4.7   CL 96* 97* 97*   CO2 26 27 26   BUN 50* 40* 46*   CREATININE 1.4* 1.1 1.2     Recent Labs     09/28/21 0449 09/29/21 0520 09/30/21  0434   CALCIUM 8.6 9.0 9.0     No results for input(s): PH, PCO2, PO2 in the last 72 hours.     Invalid input(s): Corina Tidwell    ABG:  No results found for: PH, PCO2, PO2, HCO3, BE, THGB, TCO2, O2SAT  VBG:  No results found for: PHVEN, YWI1MJR, BEVEN, G2WCZCHV    LDH:    Lab Results   Component Value Date     09/25/2021     Uric Acid:  No results found for: LABURIC, URICACID    PT/INR:    Lab Results   Component Value Date    PROTIME 15.5 09/25/2021    INR 1.35 09/25/2021     Warfarin PT/INR:  No components found for: Lori Hawthorne  PTT:    Lab Results   Component Value Date    APTT 43.1 09/30/2021   [APTT}  Last 3 Troponin:    Lab Results   Component Value Date    TROPONINI <0.01 09/25/2021    TROPONINI <0.01 09/24/2021    TROPONINI 0.01 03/19/2010       U/A:    Lab Results   Component Value Date    COLORU YELLOW 09/24/2021    PROTEINU 100 09/24/2021    PHUR 5.5 09/24/2021    WBCUA 11 09/24/2021    RBCUA 5 09/24/2021    CLARITYU CLOUDY 09/24/2021    SPECGRAV 1.019 09/24/2021    LEUKOCYTESUR Negative 09/24/2021    UROBILINOGEN 0.2 09/24/2021    BILIRUBINUR Negative 09/24/2021    BLOODU MODERATE 09/24/2021    GLUCOSEU Negative 09/24/2021     Microalbumen/Creatinine ratio:  No components found for: RUCREAT  24 Hour Urine for Protein:  No components found for: RAWUPRO, UHRS3, LIGJ89SJ, UTV3  24 Hour Urine for Creatinine Clearance:  No components found for: CREAT4, UHRS10, UTV10  Urine Toxicology:  No components found for: IAMMENTA, IBARBIT, IBENZO, ICOCAINE, IMARTHC, IOPIATES, IPHENCYC    HgBA1c:  No results found for: LABA1C  RPR:  No results found for: RPR  HIV:  No results found for: HIV  KYAW:  No results found for: ANATITER, KYAW  RF:  No results found for: RF  DSDNA:  No components found for: DNA  AMYLASE:  No results found for: AMYLASE  LIPASE:  No results found for: LIPASE  Fibrinogen Level:  No components found for: FIB       BELOW MENTIONED RADIOLOGY STUDY RESULTS BY ME (AS NEEDED FOR MY EVALUATION AND MANAGEMENT). XR CHEST PORTABLE    Result Date: 9/24/2021  EXAMINATION: ONE XRAY VIEW OF THE CHEST 9/24/2021 10:44 pm COMPARISON: 03/19/2010 HISTORY: ORDERING SYSTEM PROVIDED HISTORY: SOB TECHNOLOGIST PROVIDED HISTORY: Reason for exam:->SOB FINDINGS: Cardiomegaly. Mild right hilar prominence could be vascular. Right lung is grossly clear. There multifocal parenchymal opacity involving left lung worrisome for developing pneumonia versus interstitial alveolar infiltrate/edema. No effusion. No pneumothorax. Left-sided airspace disease. This is worrisome for developing pneumonia versus interstitial alveolar edema. At minimum, consider follow-up imaging to document resolution following medical treatment course.  RENAL COMPLETE [[id number]] Collected: 09/27/21 2223      Order Status: Completed Updated: 09/27/21 2228     Narrative:       EXAMINATION:   RETROPERITONEAL ULTRASOUND OF THE KIDNEYS AND URINARY BLADDER     9/27/2021     COMPARISON:   None     HISTORY:   ORDERING SYSTEM PROVIDED HISTORY: ASHWINI   TECHNOLOGIST PROVIDED HISTORY:     Reason for exam:->ASHWINI     FINDINGS:     Kidneys:      The right kidney measures 10 cm in length and the left kidney measures 10.8   cm in length. Kidneys demonstrate mildly increased cortical echogenicity exaggerating the   corticomedullary differentiation.  Cortical thickness is maintained. Iris Screen   evidence of hydronephrosis or intrarenal stones. Bladder:     Urinary bladder is collapsed with a catheter present. .      Impression:       Mildly increased renal cortical echogenicity but the cortical thickness is   maintained.  No shadowing calculi or hydronephrosis. Nanette Major

## 2021-09-30 NOTE — PROGRESS NOTES
Pt requests help getting moved up in bed at this time and pillows behind back. No other new changes at this time. Will continue to monitor.

## 2021-09-30 NOTE — PROGRESS NOTES
Occupational Therapy   Occupational Therapy Initial Assessment  Date: 2021   Patient Name: Artur Julian  MRN: [id number]     : 1960    Date of Service: 2021    Discharge Recommendations: Artur Julian scored a 17/24 on the AM-PAC ADL Inpatient form. Current research shows that an AM-PAC score of 17 or less is typically not associated with a discharge to the patient's home setting. Based on the patient's AM-PAC score and their current ADL deficits, it is recommended that the patient have 3-5 sessions per week of Occupational Therapy at d/c to increase the patient's independence. Please see assessment section for further patient specific details. If patient discharges prior to next session this note will serve as a discharge summary. Please see below for the latest assessment towards goals. Pt is limited by pt's high O2 needs and poor endurance. Will continue to assess d/c needs based on need for continued care. OT Equipment Recommendations  Equipment Needed: No  Other: May require a shower chair, will continue to assess. Assessment   Performance deficits / Impairments: Decreased endurance;Decreased ADL status; Decreased high-level IADLs  Assessment: Pt is a 57 y.o. M presenting with COVID 19. Pt previously indpendent, currently presenting as very limited by O2 needs and poor endurance. Pt would benefit from continued acute OT. Continue to assess pt's d/c need. Pt would continue to benefit from continued acute OT. Treatment Diagnosis: COVID 19  Prognosis: Good  Decision Making: Medium Complexity  OT Education: OT Role;Plan of Care;Energy Conservation  Patient Education: v/u  Barriers to Learning: cognition  REQUIRES OT FOLLOW UP: Yes  Activity Tolerance  Activity Tolerance: Treatment limited secondary to medical complications (free text); Patient Tolerated treatment well  Activity Tolerance: Pt on 25L airvo, O2 at 92% at rest. 83% after bed to chair transfer, recovered to aobve 90% in less than 1 min. Safety Devices  Safety Devices in place: Yes  Type of devices: Left in chair;Chair alarm in place;Call light within reach; Patient at risk for falls;Nurse notified           Patient Diagnosis(es): The primary encounter diagnosis was Acute respiratory failure with hypoxemia ([facility] [address] Diagnoses of Community acquired pneumonia of left lung, unspecified part of lung and ASHWINI (acute kidney injury) ([facility] [address] were also pertinent to this visit. has a past medical history of Anxiety, GERD (gastroesophageal reflux disease), Heartburn, and Hypertension. has a past surgical history that includes hernia repair and Testicle surgery. Treatment Diagnosis: COVID 19      Restrictions  Restrictions/Precautions  Restrictions/Precautions: Fall Risk, Isolation  Required Braces or Orthoses?: No  Position Activity Restriction  Other position/activity restrictions: This is a pleasant 57 y.o. male who is unvaccinated for COVID-19, with history of anxiety disorder, gastroesophageal reflux disease, peptic ulcer disease, essential hypertension, obesity with BMI of 30.86 kg/m², who presents to the emergency room with complaints of generalized weakness, worsening shortness of breath, body aches, cough, ongoing for the past 3 weeks. He has had diarrhea. He has had exposure to multiple individuals with COVID-19 infection. Subjective   General  Chart Reviewed: Yes  Patient assessed for rehabilitation services?: Yes  Family / Caregiver Present: No  Diagnosis: COVID 19  Subjective  Subjective: Pt met bedside, agreeable to OT eval. Mild confusion noted during eval. Soft spoken, difficult to hear.   Patient Currently in Pain: Denies  Vital Signs  Patient Currently in Pain: Denies  Height and Weight  Height: 5' 7.5\" (171.5 cm)  Social/Functional History  Social/Functional History  Lives With: Alone  Type of Home: House  Home Layout: Two level, Bed/Bath upstairs, 1/2 bath on main level (reports no handrails on steps)  Bathroom Shower/Tub: Tub only  ADL Assistance: Independent  Homemaking Assistance: Independent  Ambulation Assistance: Independent  Transfer Assistance: Independent  Active : Yes  Additional Comments: Patient poor historian, unsure of accuracy of information. Reports several near falls in bathroom, reports he needs help getting his house in order, reports decreased ability to take care of house when sick prior to admission, spoiled food in fridge, bowel incontinence in bathroom etc, reports cannot use sink in bathroom -washing hands in bathtib       Objective        Orientation  Overall Orientation Status: Within Functional Limits     Balance  Sitting Balance: Supervision  Standing Balance: Stand by assistance  Standing Balance  Time: 2 min x3  Activity: functional mobility, ADL completion. Functional Mobility  Functional - Mobility Device: No device  Activity: Other  Assist Level: Stand by assistance  ADL  Feeding: Independent  Grooming: Supervision  UE Bathing: Supervision  LE Bathing: Stand by assistance  UE Dressing: Supervision  LE Dressing: Stand by assistance  Toileting: Stand by assistance  Tone RUE  RUE Tone: Normotonic  Tone LUE  LUE Tone: Normotonic  Coordination  Movements Are Fluid And Coordinated: Yes     Bed mobility  Supine to Sit: Modified independent  Scooting: Modified independent  Comment: Pt left in recliner. HOB elevated. Transfers  Stand Step Transfers: Stand by assistance  Sit to stand: Stand by assistance  Stand to sit: Stand by assistance     Cognition  Overall Cognitive Status: Exceptions  Arousal/Alertness: Appropriate responses to stimuli  Following Commands:  Follows one step commands consistently  Attention Span: Appears intact  Memory: Decreased recall of biographical Information;Decreased short term memory;Decreased recall of recent events  Safety Judgement: Decreased awareness of need for assistance  Problem Solving: Assistance required to generate solutions  Insights: Decreased awareness of deficits  Initiation: Does not require cues  Sequencing: Does not require cues  Perception  Overall Perceptual Status: WFL              LUE AROM (degrees)  LUE AROM : WFL  Left Hand AROM (degrees)  Left Hand AROM: WFL  RUE AROM (degrees)  RUE AROM : WFL  Right Hand AROM (degrees)  Right Hand AROM: WFL  LUE Strength  Gross LUE Strength: WFL  RUE Strength  Gross RUE Strength: WFL                   Plan   Plan  Times per week: 3-5x  Times per day: Daily  Current Treatment Recommendations: Strengthening, Endurance Training, Safety Education & Training, Functional Mobility Training, Self-Care / ADL, Home Management Training      AM-PAC Score        AM-[facility] Inpatient Daily Activity Raw Score: 17 (09/30/21 1611)  AM-PAC Inpatient ADL T-Scale Score : 37.26 (09/30/21 1611)  ADL Inpatient CMS 0-100% Score: 50.11 (09/30/21 1611)  ADL Inpatient CMS G-Code Modifier : CK (09/30/21 1611)    Goals  Short term goals  Time Frame for Short term goals: d/c  Short term goal 1: Pt will complete UBD with Mod I and O2 levels above 88%  Short term goal 2: Pt will tolerate grooming in stance with Mod I  for 5 min with O2 levels functional  Short term goal 3: Pt will complete toileting with Mod I  Long term goals  Time Frame for Long term goals : STGs=LTGs  Patient Goals   Patient goals : get out of here soon       Therapy Time   Individual Concurrent Group Co-treatment   Time In 1442         Time Out 1535         Minutes 53              Timed Code Treatment Minutes:   38 min     Total Treatment Minutes:  48 min       Elvira Ramirez, OT

## 2021-10-01 LAB
ANION GAP SERPL CALCULATED.3IONS-SCNC: 11 MMOL/L (ref 3–16)
ANISOCYTOSIS: ABNORMAL
APTT: 38.5 SEC (ref 26.2–38.6)
BANDED NEUTROPHILS RELATIVE PERCENT: 1 % (ref 0–7)
BASOPHILS ABSOLUTE: 0 K/UL (ref 0–0.2)
BASOPHILS RELATIVE PERCENT: 0 %
BUN BLDV-MCNC: 43 MG/DL (ref 7–20)
C-REACTIVE PROTEIN: 11.7 MG/L (ref 0–5.1)
CALCIUM SERPL-MCNC: 9.2 MG/DL (ref 8.3–10.6)
CHLORIDE BLD-SCNC: 94 MMOL/L (ref 99–110)
CO2: 30 MMOL/L (ref 21–32)
CREAT SERPL-MCNC: 1.1 MG/DL (ref 0.8–1.3)
D DIMER: 486 NG/ML DDU (ref 0–229)
EOSINOPHILS ABSOLUTE: 0 K/UL (ref 0–0.6)
EOSINOPHILS RELATIVE PERCENT: 0 %
GFR AFRICAN AMERICAN: >60
GFR NON-AFRICAN AMERICAN: >60
GLUCOSE BLD-MCNC: 102 MG/DL (ref 70–99)
GLUCOSE BLD-MCNC: 125 MG/DL (ref 70–99)
GLUCOSE BLD-MCNC: 125 MG/DL (ref 70–99)
GLUCOSE BLD-MCNC: 197 MG/DL (ref 70–99)
GLUCOSE BLD-MCNC: 213 MG/DL (ref 70–99)
HCT VFR BLD CALC: 47.4 % (ref 40.5–52.5)
HEMATOLOGY PATH CONSULT: NORMAL
HEMOGLOBIN: 15.9 G/DL (ref 13.5–17.5)
LYMPHOCYTES ABSOLUTE: 1.2 K/UL (ref 1–5.1)
LYMPHOCYTES RELATIVE PERCENT: 6 %
MCH RBC QN AUTO: 27 PG (ref 26–34)
MCHC RBC AUTO-ENTMCNC: 33.6 G/DL (ref 31–36)
MCV RBC AUTO: 80.4 FL (ref 80–100)
METAMYELOCYTES RELATIVE PERCENT: 1 %
MICROCYTES: ABNORMAL
MONOCYTES ABSOLUTE: 2.2 K/UL (ref 0–1.3)
MONOCYTES RELATIVE PERCENT: 11 %
NEUTROPHILS ABSOLUTE: 16.4 K/UL (ref 1.7–7.7)
NEUTROPHILS RELATIVE PERCENT: 81 %
PDW BLD-RTO: 14.5 % (ref 12.4–15.4)
PERFORMED ON: ABNORMAL
PLATELET # BLD: 326 K/UL (ref 135–450)
PLATELET SLIDE REVIEW: ADEQUATE
PMV BLD AUTO: 8.3 FL (ref 5–10.5)
POTASSIUM REFLEX MAGNESIUM: 5.1 MMOL/L (ref 3.5–5.1)
RBC # BLD: 5.9 M/UL (ref 4.2–5.9)
SLIDE REVIEW: ABNORMAL
SODIUM BLD-SCNC: 135 MMOL/L (ref 136–145)
WBC # BLD: 19.7 K/UL (ref 4–11)

## 2021-10-01 PROCEDURE — 6360000002 HC RX W HCPCS: Performed by: HOSPITALIST

## 2021-10-01 PROCEDURE — 6360000002 HC RX W HCPCS: Performed by: INTERNAL MEDICINE

## 2021-10-01 PROCEDURE — 6370000000 HC RX 637 (ALT 250 FOR IP): Performed by: HOSPITALIST

## 2021-10-01 PROCEDURE — 85730 THROMBOPLASTIN TIME PARTIAL: CPT

## 2021-10-01 PROCEDURE — 2500000003 HC RX 250 WO HCPCS: Performed by: INTERNAL MEDICINE

## 2021-10-01 PROCEDURE — 99233 SBSQ HOSP IP/OBS HIGH 50: CPT | Performed by: INTERNAL MEDICINE

## 2021-10-01 PROCEDURE — 85025 COMPLETE CBC W/AUTO DIFF WBC: CPT

## 2021-10-01 PROCEDURE — 2580000003 HC RX 258: Performed by: INTERNAL MEDICINE

## 2021-10-01 PROCEDURE — 2060000000 HC ICU INTERMEDIATE R&B

## 2021-10-01 PROCEDURE — 85379 FIBRIN DEGRADATION QUANT: CPT

## 2021-10-01 PROCEDURE — 80048 BASIC METABOLIC PNL TOTAL CA: CPT

## 2021-10-01 PROCEDURE — 36415 COLL VENOUS BLD VENIPUNCTURE: CPT

## 2021-10-01 PROCEDURE — 86140 C-REACTIVE PROTEIN: CPT

## 2021-10-01 PROCEDURE — 6370000000 HC RX 637 (ALT 250 FOR IP): Performed by: INTERNAL MEDICINE

## 2021-10-01 PROCEDURE — 94761 N-INVAS EAR/PLS OXIMETRY MLT: CPT

## 2021-10-01 PROCEDURE — 2700000000 HC OXYGEN THERAPY PER DAY

## 2021-10-01 RX ADMIN — SODIUM CHLORIDE 25 ML: 9 INJECTION, SOLUTION INTRAVENOUS at 09:40

## 2021-10-01 RX ADMIN — MORPHINE SULFATE 2 MG: 2 INJECTION, SOLUTION INTRAMUSCULAR; INTRAVENOUS at 22:14

## 2021-10-01 RX ADMIN — OXYCODONE AND ACETAMINOPHEN 1 TABLET: 10; 325 TABLET ORAL at 20:24

## 2021-10-01 RX ADMIN — OXYCODONE AND ACETAMINOPHEN 1 TABLET: 10; 325 TABLET ORAL at 00:58

## 2021-10-01 RX ADMIN — INSULIN LISPRO 4 UNITS: 100 INJECTION, SOLUTION INTRAVENOUS; SUBCUTANEOUS at 18:37

## 2021-10-01 RX ADMIN — Medication 10 ML: at 20:25

## 2021-10-01 RX ADMIN — MORPHINE SULFATE 2 MG: 2 INJECTION, SOLUTION INTRAMUSCULAR; INTRAVENOUS at 17:54

## 2021-10-01 RX ADMIN — INSULIN LISPRO 1 UNITS: 100 INJECTION, SOLUTION INTRAVENOUS; SUBCUTANEOUS at 20:58

## 2021-10-01 RX ADMIN — REMDESIVIR 100 MG: 100 INJECTION, POWDER, LYOPHILIZED, FOR SOLUTION INTRAVENOUS at 10:15

## 2021-10-01 RX ADMIN — FAMOTIDINE 20 MG: 20 TABLET, FILM COATED ORAL at 20:24

## 2021-10-01 RX ADMIN — MORPHINE SULFATE 2 MG: 2 INJECTION, SOLUTION INTRAMUSCULAR; INTRAVENOUS at 05:08

## 2021-10-01 RX ADMIN — OXYCODONE AND ACETAMINOPHEN 1 TABLET: 10; 325 TABLET ORAL at 07:55

## 2021-10-01 RX ADMIN — ENOXAPARIN SODIUM 90 MG: 100 INJECTION SUBCUTANEOUS at 10:05

## 2021-10-01 RX ADMIN — DEXAMETHASONE SODIUM PHOSPHATE 20 MG: 4 INJECTION, SOLUTION INTRA-ARTICULAR; INTRALESIONAL; INTRAMUSCULAR; INTRAVENOUS; SOFT TISSUE at 09:41

## 2021-10-01 RX ADMIN — Medication 2000 UNITS: at 10:05

## 2021-10-01 RX ADMIN — FUROSEMIDE 20 MG: 10 INJECTION, SOLUTION INTRAMUSCULAR; INTRAVENOUS at 10:05

## 2021-10-01 RX ADMIN — FAMOTIDINE 20 MG: 20 TABLET, FILM COATED ORAL at 10:05

## 2021-10-01 RX ADMIN — ENOXAPARIN SODIUM 90 MG: 100 INJECTION SUBCUTANEOUS at 20:24

## 2021-10-01 RX ADMIN — Medication 10 ML: at 09:40

## 2021-10-01 RX ADMIN — OXYCODONE AND ACETAMINOPHEN 1 TABLET: 10; 325 TABLET ORAL at 14:05

## 2021-10-01 RX ADMIN — FUROSEMIDE 20 MG: 10 INJECTION, SOLUTION INTRAMUSCULAR; INTRAVENOUS at 17:54

## 2021-10-01 RX ADMIN — MORPHINE SULFATE 2 MG: 2 INJECTION, SOLUTION INTRAMUSCULAR; INTRAVENOUS at 10:05

## 2021-10-01 ASSESSMENT — PAIN DESCRIPTION - LOCATION
LOCATION: BACK

## 2021-10-01 ASSESSMENT — PAIN SCALES - GENERAL
PAINLEVEL_OUTOF10: 6
PAINLEVEL_OUTOF10: 5
PAINLEVEL_OUTOF10: 8
PAINLEVEL_OUTOF10: 9
PAINLEVEL_OUTOF10: 0
PAINLEVEL_OUTOF10: 8
PAINLEVEL_OUTOF10: 8
PAINLEVEL_OUTOF10: 9
PAINLEVEL_OUTOF10: 9
PAINLEVEL_OUTOF10: 7
PAINLEVEL_OUTOF10: 9

## 2021-10-01 ASSESSMENT — PAIN DESCRIPTION - PAIN TYPE
TYPE: CHRONIC PAIN

## 2021-10-01 ASSESSMENT — PAIN DESCRIPTION - ORIENTATION
ORIENTATION: LEFT
ORIENTATION: LEFT

## 2021-10-01 NOTE — PROGRESS NOTES
PULMONARY AND CRITICAL CARE MEDICINE PROGRESS NOTE    Subjective: Patient has been weaned down to 10 to 12 L nasal cannula. Sitting comfortably in the bed. REVIEW OF SYSTEMS:   Constitutional symptoms: The patient denies fever, fatigue, night sweats, weight loss or weight gain. HEENT: No vision changes. No tinnitus, Denies sinus pain. No hoarseness, or dysphagia. Neck: Patient denies swelling in the neck. Cardiovascular: Denies chest pain, palpitation, syncope. Respiratory: Denies shortness of breath or cough. Gastrointestinal: Denies nausea, abdominal pain or change in bowel function. Genitourinary: Denies obstructive symptoms. No history of incontinence. Skin: No rashes or itching. Muskuloskeletal: Denies weakness or bone pain. Neurological: No headaches or seizures. Psychiatric: Denies mood swings or depression. MEDICATIONS:     Scheduled Meds:   insulin lispro  0-12 Units SubCUTAneous TID WC    insulin lispro  0-6 Units SubCUTAneous Nightly    remdesivir IVPB  100 mg IntraVENous Q24H    enoxaparin  1 mg/kg SubCUTAneous BID    furosemide  20 mg IntraVENous BID    sodium chloride flush  5-40 mL IntraVENous 2 times per day    famotidine  20 mg Oral BID    Vitamin D  2,000 Units Oral Daily    dexamethasone  20 mg IntraVENous Daily       Current Infusions:    sodium chloride 25 mL (10/01/21 0940)    dextrose         PRN meds:  sennosides-docusate sodium, hydrALAZINE, oxyCODONE-acetaminophen, polyvinyl alcohol, morphine, sodium chloride flush, sodium chloride, aluminum & magnesium hydroxide-simethicone, ondansetron **OR** ondansetron, polyethylene glycol, acetaminophen **OR** acetaminophen, guaiFENesin-dextromethorphan, glucose, dextrose, glucagon (rDNA), dextrose    PHYSICAL EXAM:  BP (!) 132/111   Pulse 116   Temp 97 °F (36.1 °C) (Temporal)   Resp 20   Ht 5' 7.5\" (1.715 m)   Wt 201 lb 1.6 oz (91.2 kg)   SpO2 92%   BMI 31.03 kg/m²  I/O last 3 completed shifts:   In: 1524.4 [P.O.:960; I.V.:14.4; IV Piggyback:550]  Out: 1625 [Urine:1625] I/O this shift:  In: -   Out: 250 [Urine:250]      Intake/Output Summary (Last 24 hours) at 10/1/2021 1157  Last data filed at 10/1/2021 1100  Gross per 24 hour   Intake 490 ml   Output 1350 ml   Net -860 ml       CONSTITUTIONAL: He is a 57y.o.-year-old who appears his stated age. He is alert and oriented x 3 and in no acute distress. CARDIOVASCULAR: S1 S2 RRR. Without murmer  RESPIRATORY & CHEST: Lungs are clear to auscultation and percussion. No wheezing, no crackles. Good air movement  GASTROINTESTINAL & ABDOMEN: Soft, nontender, positive bowel sounds in all quadrants, non-distended, without hepatosplenomegaly. GENITOURINARY: Deferred. MUSCULOSKELETAL: No tenderness to palpation of the axial skeleton. There is no clubbing. No cyanosis. No edema of the lower extremities. SKIN OF BODY: No rash or jaundice. PSYCHIATRIC EVALUATION: Normal affect. Patient answers questions appropriately. HEMATOLOGIC/LYMPHATIC/ IMMUNOLOGIC: No palpable lymphadenopathy. NEUROLOGIC: Alert and oriented x 3. Groslly non-focal. Motor strength is 5+/5 in all muscle groups. The patient has a normal sensorium globally.      LABS:    Recent Labs     09/29/21  0520 09/30/21  0434 10/01/21  0429   WBC 16.6* 20.3* 19.7*   RBC 5.88 6.22* 5.90   HGB 15.7 16.7 15.9   HCT 46.1 50.9 47.4   MCH 26.7 26.9 27.0   MCHC 34.0 32.9 33.6   RDW 14.3 14.9 14.5    307 326   MPV 9.0 9.0 8.3   NEUTOPHILPCT 95.0  --  81.0   MONOPCT 2.0  --  11.0   BASOPCT 0.0  --  0.0     Recent Labs     09/29/21  0520 09/30/21  0434 10/01/21  0429   * 136 135*   K 4.1 4.7 5.1   CL 97* 97* 94*   ANIONGAP 11 13 11   CO2 27 26 30   BUN 40* 46* 43*   CREATININE 1.1 1.2 1.1   CALCIUM 9.0 9.0 9.2   PROT 6.6  --   --    BILITOT 0.5  --   --    ALKPHOS 81  --   --    ALT 65*  --   --    AST 29  --   --    GLUCOSE 133* 130* 125*     Imaging:  I reviewed the chest x-ray from 9/27/2021 and my interpretation is as follows.  Bilateral multifocal infiltrates.        IMPRESSION:  1. Acute hypoxemic respiratory failure  2. COVID-19 pneumonia  3. Hypercoagulable state  4. Acute kidney injury, resolved        PLAN:  Patient has been weaned down to 10-12 L NC. Saturating well.  No respiratory distress.    On Decadron 20 mg IV daily.  Day 7/10 of remdesivir.  Also received 1 dose of Actemra.  CRP is downtrending. Decadron can be decreased to 10 mg from tomorrow onwards. Patient was noted to have elevated D-dimer for which she was initiated on heparin drip in the setting of acute kidney injury. D-dimer is down to 486 now.    Now on therapeutic Lovenox as creatinine has normalized. Leukocytosis. Negative procalcitonin. Encourage prone positioning in the bed as tolerated.  Out of bed in chair. Patient can be downgraded and transferred out of ICU. Rafita Hall MD  Pulmonary Critical Care and Sleep Medicine  10/1/2021, 11:57 AM    This note was completed using dragon medical speech recognition software. Grammatical errors, random word insertions, pronoun errors and incomplete sentences are occasional consequences of this technology due to software limitations. If there are questions or concerns about the content of this note of information contained within the body of this dictation they should be addressed with the provider for clarification.

## 2021-10-01 NOTE — PLAN OF CARE
Problem: Falls - Risk of:  Goal: Will remain free from falls  Description: Will remain free from falls  9/30/2021 2143 by Janet Gastelum RN  Outcome: Ongoing  9/30/2021 1353 by Mira Uriostegui RN  Outcome: Ongoing  Goal: Absence of physical injury  Description: Absence of physical injury  9/30/2021 2143 by Janet Gastelum RN  Outcome: Ongoing  9/30/2021 1353 by Mira Uriostegui RN  Outcome: Ongoing     Problem: Pain:  Goal: Pain level will decrease  Description: Pain level will decrease  9/30/2021 2143 by Janet Gastelum RN  Outcome: Ongoing  9/30/2021 1353 by Mira Uriostegui RN  Outcome: Ongoing  Goal: Control of acute pain  Description: Control of acute pain  9/30/2021 2143 by Janet Gastelum RN  Outcome: Ongoing  9/30/2021 1353 by Mira Uriostegui RN  Outcome: Ongoing  Goal: Control of chronic pain  Description: Control of chronic pain  9/30/2021 2143 by Janet Gastelum RN  Outcome: Ongoing  9/30/2021 1353 by Mira Uriostegui RN  Outcome: Ongoing     Problem: Airway Clearance - Ineffective  Goal: Achieve or maintain patent airway  9/30/2021 2143 by Janet Gastelum RN  Outcome: Ongoing  9/30/2021 1353 by Mira Uriostegui RN  Outcome: Ongoing     Problem: Gas Exchange - Impaired  Goal: Absence of hypoxia  9/30/2021 2143 by Janet Gastelum RN  Outcome: Ongoing  9/30/2021 1353 by Mira Uriostegui RN  Outcome: Ongoing  Goal: Promote optimal lung function  9/30/2021 2143 by Janet Gastelum RN  Outcome: Ongoing  9/30/2021 1353 by Mira Uriostegui RN  Outcome: Ongoing     Problem: Breathing Pattern - Ineffective  Goal: Ability to achieve and maintain a regular respiratory rate  9/30/2021 2143 by Janet Gastelum RN  Outcome: Ongoing  9/30/2021 1353 by Mira Uriostegui RN  Outcome: Ongoing     Problem:  Body Temperature -  Risk of, Imbalanced  Goal: Ability to maintain a body temperature within defined limits  9/30/2021 2143 by Janet Gastelum RN  Outcome: Ongoing  9/30/2021 1353 by Mira Uriostegui RN  Outcome: Ongoing  Goal: Will regain or maintain usual level of consciousness  9/30/2021 2143 by Jose Oviedo RN  Outcome: Ongoing  9/30/2021 1353 by Chastity Rojas RN  Outcome: Ongoing  Goal: Complications related to the disease process, condition or treatment will be avoided or minimized  9/30/2021 2143 by Jose Oviedo RN  Outcome: Ongoing  9/30/2021 1353 by Chastity Rojas RN  Outcome: Ongoing     Problem: Isolation Precautions - Risk of Spread of Infection  Goal: Prevent transmission of infection  9/30/2021 2143 by Jose Oviedo RN  Outcome: Ongoing  9/30/2021 1353 by Chastity Rojas RN  Outcome: Ongoing     Problem: Nutrition Deficits  Goal: Optimize nutritional status  9/30/2021 2143 by Jsoe Oviedo RN  Outcome: Ongoing  9/30/2021 1353 by Chastity Rojas RN  Outcome: Ongoing     Problem: Risk for Fluid Volume Deficit  Goal: Maintain normal heart rhythm  9/30/2021 2143 by Jose Oviedo RN  Outcome: Ongoing  9/30/2021 1353 by Chastity Rojas RN  Outcome: Ongoing  Goal: Maintain absence of muscle cramping  9/30/2021 2143 by Jose Oviedo RN  Outcome: Ongoing  9/30/2021 1353 by Chastity Rojas RN  Outcome: Ongoing  Goal: Maintain normal serum potassium, sodium, calcium, phosphorus, and pH  9/30/2021 2143 by Jose Oviedo RN  Outcome: Ongoing  9/30/2021 1353 by Chastity Rojas RN  Outcome: Ongoing     Problem: Loneliness or Risk for Loneliness  Goal: Demonstrate positive use of time alone when socialization is not possible  9/30/2021 2143 by Jose Oviedo RN  Outcome: Ongoing  9/30/2021 1353 by Chastity Rojas RN  Outcome: Ongoing     Problem: Fatigue  Goal: Verbalize increase energy and improved vitality  9/30/2021 2143 by Jose Oviedo RN  Outcome: Ongoing  9/30/2021 1353 by Chastity Rojas RN  Outcome: Ongoing     Problem: Patient Education: Go to Patient Education Activity  Goal: Patient/Family Education  9/30/2021 2143 by Jose Oviedo RN  Outcome: Ongoing  9/30/2021 1353 by Sarah Aponte RN  Outcome: Ongoing     Problem: Nutrition  Goal: Optimal nutrition therapy  9/30/2021 2143 by Sujey Yepez RN  Outcome: Ongoing  9/30/2021 1443 by Anabella Fonseca, DYLAN, LD  Outcome: Ongoing  9/30/2021 1353 by Sarah Aponte RN  Outcome: Ongoing

## 2021-10-01 NOTE — PROGRESS NOTES
Buddy Sella, MD Norris Southward, MD Raleigh Najjar, MD               Office: [phone]                      Fax: [phone]             [facility]                   NEPHROLOGY INPATIENT PROGRESS NOTE:     PATIENT NAME: Lakeshia Mondragon  : 1960  MRN: [id number]        RECOMMENDATIONS:   COVID-19 management: ongoing   - Rx: Lasix IV BID - continue to keep him as \"dry\" as possible - worsening Azotemia more d/to steroids + mild pre-renal, but Cr stable  -ok for Actemra - no renal dosing   -ok for Ramdesivir : short course: 3-5 days. 100 mg   -IV Decadron   - might be causing azotemia   -Convalescent plasma not given  -A/C per protocol - to prevent renal microthrombi too   -Pneumonia antibiotic coverage, might need as elevated pro-calcitonin  -Hypoxia monitoring - severe needing HFNC - - some improvement. - hold home Lisinopril   - monitor PVR w/ bladder scan for jarvis insertion need. - no need for dialysis , but at higher risk for decompensation, needing closer monitoring. D/C plan from renal stand point:  - renally stable, but COVID hypoxia    D/W primary team - hospitalist  - Dr Jazmyn Godwin:       Admitted for:  ASHWINI (acute kidney injury) ([facility] [address] [N17.9]  Acute respiratory failure with hypoxemia ([facility] [address] [J96.01]  Community acquired pneumonia of left lung, unspecified part of lung [J18.9]  Pneumonia due to COVID-19 virus [U07.1, J12.82]      ASHWINI (on non -CKD: likely stage 2 ):    - BL Scr- 0.9 as off 2021 ---> 2.1 on admission  -: Etiology of ASHWINI - presumed clinically ATN   - other differentials: ?covid related nephropathy, unlikely GN / TI / TMA process   - UA : reviewed = fine cast = ATN   - Renal US - Mildly increased renal cortical echogenicity but the cortical thickness is   maintained.  No shadowing calculi or hydronephrosis. .     Associated problems:   Hyponatremia - moderate - d/to ASHWINI   Hypokalemia   Acidosis : high -AGMA , d/to ASHWINI, LA WNL    Other major problems: Management per primary and other consulting teams. //   Acute Hypoxia respiratory failure- needing NIPPV   COVID (+) PNA      Hospital Problems         Last Modified POA    * (Principal) Pneumonia due to COVID-19 virus 9/25/2021 Yes    HTN (hypertension) 9/25/2021 Yes    GERD (gastroesophageal reflux disease) 9/25/2021 Yes    Acute respiratory failure with hypoxia ([facility] [address] 9/25/2021 Yes    ASHWINI (acute kidney injury) ([facility] [address] 9/25/2021 Yes    Hyponatremia 9/25/2021 Yes    Anxiety 9/25/2021 Yes    Obesity (BMI 30-39.9) 9/25/2021 Yes        : other supportive care :   - Check daily renal function panel with electrolytes-phosphorus  - Strict monitoring of I/Os, daily weight  - Renal feeds/diet  - Current medications reviewed. - Nephrotoxic medications have been discontinued. - Dose adjusted and appropriate. - Dose meds for eGFR <15 mL/min/1.73m2 during ASHWINI    - Avoid heavy opioids due to renal failure - may use very low dose dilaudid / fentanyl with close monitoring of CNS and respiratory depression. Please refer to the orders. High Complexity. Multiple complex problems. Discussed with patient 's treatment team   Time spent > 30 (~35) minutes. Thank you for allowing me to participate in this patient's care. Please do not hesitate to contact me anytime. We will follow along with you. Priscilla Suero MD,  [facility] [address] [place] [address] [place]: [phone] or [employer]  Fax: [phone]        ========================================================   ========================================================      This patient is COVID-19 positive, so in a strict isolation, as per current protocol; So in order to preserve PPE supply and to avoid unnecessary exposure to prevent transmission of COVID-19; this patient was NOT examined face to face, as risk of contact outweighs the benefits and likely would not change much of my clinical management.    HPI, sodium chloride 0.9 % 250 mL IVPB, 100 mg, IntraVENous, Q24H, Billie Arvizu MD, Stopped at 09/30/21 1207    enoxaparin (LOVENOX) injection 90 mg, 1 mg/kg, SubCUTAneous, BID, Billie Arvizu MD, 90 mg at 09/30/21 2041    furosemide (LASIX) injection 20 mg, 20 mg, IntraVENous, BID, Kandice Petersen MD, 20 mg at 09/30/21 1759    hydrALAZINE (APRESOLINE) injection 10 mg, 10 mg, IntraVENous, Q6H PRN, Carolina Schuler MD, 10 mg at 09/28/21 2046    oxyCODONE-acetaminophen (PERCOCET)  MG per tablet 1 tablet, 1 tablet, Oral, Q6H PRN, Chanel Abbott MD, 1 tablet at 10/01/21 0755    polyvinyl alcohol (LIQUIFILM TEARS) 1.4 % ophthalmic solution 1 drop, 1 drop, Both Eyes, Q4H PRN, Bhumi Flores MD, 1 drop at 09/26/21 2021    morphine (PF) injection 2 mg, 2 mg, IntraVENous, Q4H PRN, Chanel Abbott MD, 2 mg at 10/01/21 0508    sodium chloride flush 0.9 % injection 5-40 mL, 5-40 mL, IntraVENous, 2 times per day, Frances Salazar MD, 10 mL at 10/01/21 0940    sodium chloride flush 0.9 % injection 5-40 mL, 5-40 mL, IntraVENous, PRN, Terry Spann MD    0.9 % sodium chloride infusion, 25 mL, IntraVENous, PRN, Frances Salazar MD, Last Rate: 100 mL/hr at 10/01/21 0940, 25 mL at 10/01/21 0940    famotidine (PEPCID) tablet 20 mg, 20 mg, Oral, BID, Frances Salazar MD, 20 mg at 09/30/21 2041    aluminum & magnesium hydroxide-simethicone (MAALOX) 200-200-20 MG/5ML suspension 30 mL, 30 mL, Oral, Q6H PRN, Frances Salazar MD    ondansetron (ZOFRAN-ODT) disintegrating tablet 4 mg, 4 mg, Oral, Q8H PRN, 4 mg at 09/28/21 1029 **OR** ondansetron (ZOFRAN) injection 4 mg, 4 mg, IntraVENous, Q6H PRN, Terry Spann MD, 4 mg at 09/27/21 1140    polyethylene glycol (GLYCOLAX) packet 17 g, 17 g, Oral, Daily PRN, Frances Salazar MD, 17 g at 09/30/21 0759    acetaminophen (TYLENOL) tablet 650 mg, 650 mg, Oral, Q6H PRN, 650 mg at 09/28/21 0913 **OR** acetaminophen (TYLENOL) suppository 650 mg, 650 mg, Rectal, Q6H PRN, Terry EPPERSON Darryl Robin MD    guaiFENesin-dextromethorphan (ROBITUSSIN DM) 100-10 MG/5ML syrup 5 mL, 5 mL, Oral, Q4H PRN, Michelle Lopez MD    Vitamin D (CHOLECALCIFEROL) tablet 2,000 Units, 2,000 Units, Oral, Daily, Michelle Lopez MD, 2,000 Units at 09/30/21 0759    dexamethasone (DECADRON) 20 mg in sodium chloride 0.9 % IVPB, 20 mg, IntraVENous, Daily, Yaquelin Yen MD, Last Rate: 100 mL/hr at 10/01/21 0941, 20 mg at 10/01/21 0941    glucose (GLUTOSE) 40 % oral gel 15 g, 15 g, Oral, PRN, Eladia Mcpherson MD    dextrose 50 % IV solution, 12.5 g, IntraVENous, PRN, Eladia Mcpherson MD    glucagon (rDNA) injection 1 mg, 1 mg, IntraMUSCular, PRN, Regine Covington MD    dextrose 5 % solution, 100 mL/hr, IntraVENous, PRN, Regine Covington MD    REVIEW OF SYSTEMS  Review of systems not obtained due to patient factors      PHYSICAL EXAM:  Recent vital signs and recent I/Os reviewed by me.      Wt Readings from Last 3 Encounters:   10/01/21 201 lb 1.6 oz (91.2 kg)   09/30/15 208 lb 12.8 oz (94.7 kg)   07/17/14 195 lb (88.5 kg)     BP Readings from Last 3 Encounters:   10/01/21 (!) 132/111   09/30/15 (!) 130/100   07/17/14 122/90     Patient Vitals for the past 24 hrs:   BP Temp Temp src Pulse Resp SpO2 Height Weight   10/01/21 0925 (!) 132/111 97 °F (36.1 °C) Temporal 116 20 92 % -- --   10/01/21 0501 (!) 128/99 96 °F (35.6 °C) Temporal 94 18 -- -- 201 lb 1.6 oz (91.2 kg)   10/01/21 0350 -- -- -- -- 15 (!) 89 % -- --   09/30/21 2343 -- -- -- -- 15 94 % -- --   09/30/21 2340 (!) 146/99 96 °F (35.6 °C) Temporal 104 21 94 % -- --   09/30/21 2100 -- -- -- 113 20 94 % -- --   09/30/21 2035 -- 97.3 °F (36.3 °C) Temporal 115 20 94 % -- --   09/30/21 2000 -- -- -- 116 22 93 % -- --   09/30/21 1939 -- -- -- -- 28 90 % -- --   09/30/21 1900 -- -- -- 119 19 93 % -- --   09/30/21 1601 115/89 95.8 °F (35.4 °C) Temporal 112 26 96 % -- --   09/30/21 1435 -- -- -- -- -- -- 5' 7.5\" (1.715 m) --   09/30/21 1130 (!) 119/91 96.5 °F (35.8 °C) Temporal 84 15 93 % -- --       Intake/Output Summary (Last 24 hours) at 10/1/2021 1003  Last data filed at 10/1/2021 0558  Gross per 24 hour   Intake 1034.44 ml   Output 1625 ml   Net -590.56 ml          Physical exam:    In-person bedside physical examination deferred. Pursuant to the emergency declaration under the [facility], [address] [place] [address] and the [employer] Act, this clinical encounter was conducted to provide necessary medical care. (Also consistent with new provisions and guidance offered by [facility] on March 18, 2020 in setting of COVID 19 outbreak and in order to preserve personal protective equipment in accordance with the flexibilities announced by CMS on March 30, 2020)   References: https://[facility]. [facility]/Portals/0/Resources/COVID-19/3_18%20Telemed%20Guidance%20Updated%20March%2018. pdf?[email or web address]                      https://[facility]. [facility]/Portals/0/Resources/COVID-19/3_18%20Telemed%20Guidance%20Updated%20March%2018. pdf?jrs=[email or web address]                      http://[employer]/. pdf    However, I did visually inspect the patient and observed the following:      Vitals signs reviewed. Constitutional:       General: not in acute distress. Appearance:   ill-appearing. HENT:      Head: Normocephalic and atraumatic. Nose: Nose normal.      Mouth/Throat:      Mouth: Mucous membranes are moist.   Eyes:      General: No scleral icterus. Conjunctiva/sclera: Conjunctivae normal.   Neck:      Musculoskeletal: Normal range of motion and neck supple. Cardiovascular:      Rate and Rhythm: Normal rate. Pulmonary:      Effort: Pulmonary effort is ab-normal. On HFNC  Abdominal:      General: There is no distension. Musculoskeletal:      Right lower leg: edema. Left lower leg: edema.    Skin:     Coloration: Skin is not jaundiced. Neurological:      General: deferred          DATA:  Diagnostic tests reviewed by me for today's visit:   (AS NEEDED FOR MY EVALUATION AND MANAGEMENT). Recent Labs     09/29/21  0520 09/30/21  0434 10/01/21  0429   WBC 16.6* 20.3* 19.7*   HCT 46.1 50.9 47.4    307 326     Iron Saturation:  No components found for: PERCENTFE  FERRITIN:    Lab Results   Component Value Date    FERRITIN 3,230.0 09/25/2021     IRON:  No results found for: IRON  TIBC:  No results found for: TIBC    Recent Labs     09/29/21  0520 09/30/21  0434 10/01/21  0429   * 136 135*   K 4.1 4.7 5.1   CL 97* 97* 94*   CO2 27 26 30   BUN 40* 46* 43*   CREATININE 1.1 1.2 1.1     Recent Labs     09/29/21  0520 09/30/21  0434 10/01/21  0429   CALCIUM 9.0 9.0 9.2     No results for input(s): PH, PCO2, PO2 in the last 72 hours.     Invalid input(s): Lelon Lacrosse    ABG:  No results found for: PH, PCO2, PO2, HCO3, BE, THGB, TCO2, O2SAT  VBG:  No results found for: PHVEN, ITN2WXV, BEVEN, F1YOUAKA    LDH:    Lab Results   Component Value Date     09/25/2021     Uric Acid:  No results found for: LABURIC, URICACID    PT/INR:    Lab Results   Component Value Date    PROTIME 15.5 09/25/2021    INR 1.35 09/25/2021     Warfarin PT/INR:  No components found for: Nichole Arevalo  PTT:    Lab Results   Component Value Date    APTT 38.5 10/01/2021   [APTT}  Last 3 Troponin:    Lab Results   Component Value Date    TROPONINI <0.01 09/25/2021    TROPONINI <0.01 09/24/2021    TROPONINI 0.01 03/19/2010       U/A:    Lab Results   Component Value Date    COLORU YELLOW 09/24/2021    PROTEINU 100 09/24/2021    PHUR 5.5 09/24/2021    WBCUA 11 09/24/2021    RBCUA 5 09/24/2021    CLARITYU CLOUDY 09/24/2021    SPECGRAV 1.019 09/24/2021    LEUKOCYTESUR Negative 09/24/2021    UROBILINOGEN 0.2 09/24/2021    BILIRUBINUR Negative 09/24/2021    BLOODU MODERATE 09/24/2021    GLUCOSEU Negative 09/24/2021     Microalbumen/Creatinine ratio:  No components found for: RUCREAT  24 Hour Urine for Protein:  No components found for: RAWUPRO, UHRS3, FNRG90UB, UTV3  24 Hour Urine for Creatinine Clearance:  No components found for: CREAT4, UHRS10, UTV10  Urine Toxicology:  No components found for: IAMMENTA, IBARBIT, IBENZO, ICOCAINE, IMARTHC, IOPIATES, IPHENCYC    HgBA1c:  No results found for: LABA1C  RPR:  No results found for: RPR  HIV:  No results found for: HIV  KYAW:  No results found for: ANATITER, KYAW  RF:  No results found for: RF  DSDNA:  No components found for: DNA  AMYLASE:  No results found for: AMYLASE  LIPASE:  No results found for: LIPASE  Fibrinogen Level:  No components found for: FIB       BELOW MENTIONED RADIOLOGY STUDY RESULTS BY ME (AS NEEDED FOR MY EVALUATION AND MANAGEMENT). XR CHEST PORTABLE    Result Date: 9/24/2021  EXAMINATION: ONE XRAY VIEW OF THE CHEST 9/24/2021 10:44 pm COMPARISON: 03/19/2010 HISTORY: ORDERING SYSTEM PROVIDED HISTORY: SOB TECHNOLOGIST PROVIDED HISTORY: Reason for exam:->SOB FINDINGS: Cardiomegaly. Mild right hilar prominence could be vascular. Right lung is grossly clear. There multifocal parenchymal opacity involving left lung worrisome for developing pneumonia versus interstitial alveolar infiltrate/edema. No effusion. No pneumothorax. Left-sided airspace disease. This is worrisome for developing pneumonia versus interstitial alveolar edema. At minimum, consider follow-up imaging to document resolution following medical treatment course.  RENAL COMPLETE [[id number]] Collected: 09/27/21 2223      Order Status: Completed Updated: 09/27/21 2228     Narrative:       EXAMINATION:   RETROPERITONEAL ULTRASOUND OF THE KIDNEYS AND URINARY BLADDER     9/27/2021     COMPARISON:   None     HISTORY:   ORDERING SYSTEM PROVIDED HISTORY: ASHWINI   TECHNOLOGIST PROVIDED HISTORY:     Reason for exam:->ASHWINI     FINDINGS:     Kidneys:      The right kidney measures 10 cm in length and the left kidney measures 10.8   cm in length. Kidneys demonstrate mildly increased cortical echogenicity exaggerating the   corticomedullary differentiation.  Cortical thickness is maintained. Nasreen Shorten   evidence of hydronephrosis or intrarenal stones. Bladder:     Urinary bladder is collapsed with a catheter present. .      Impression:       Mildly increased renal cortical echogenicity but the cortical thickness is   maintained.  No shadowing calculi or hydronephrosis. Chrissy Campbell

## 2021-10-01 NOTE — CARE COORDINATION
CM placed call to Addie/ [facility] to advise that patient is within her parameters for admission and determine bed availability. Awaiting call back.     CRISSY MarianoN, CCM, RN  [facility]  [phone]

## 2021-10-01 NOTE — CARE COORDINATION
Patient has been accepted to [facility] with an anticipated bed availability over the weekend. Transfer would need to be after 1900 due to discharge status on the unit. MD would need to provide best contact information for a doc to doc handoff.     Jalyn Ventura, BSN, CCM, RN  [facility]  [phone]

## 2021-10-01 NOTE — PROGRESS NOTES
[facility] PROGRESS NOTE    10/1/2021 9:31 AM        Name: Lavell Mace .               Admitted: 9/24/2021  Primary Care Provider: Marc Reynoso MD (Tel: [phone])                        Interval history:    Patient seen and examined today  Overnight events, interval insulin notes and labs reviewed  O2 recommend down to 12 L high flow nasal cannula; satting around 91%  Afebrile overnight, WBCs remain elevated secondary to steroids  On day 8/10 of remdesivir; status post 1 dose of Actemra; CRP trended down  D-dimer trended up; continue therapeutic Lovenox      Current Medications  sennosides-docusate sodium (SENOKOT-S) 8.6-50 MG tablet 2 tablet, Daily PRN  insulin lispro (1 Unit Dial) 0-12 Units, TID WC  insulin lispro (1 Unit Dial) 0-6 Units, Nightly  remdesivir 100 mg in sodium chloride 0.9 % 250 mL IVPB, Q24H  enoxaparin (LOVENOX) injection 90 mg, BID  furosemide (LASIX) injection 20 mg, BID  hydrALAZINE (APRESOLINE) injection 10 mg, Q6H PRN  oxyCODONE-acetaminophen (PERCOCET)  MG per tablet 1 tablet, Q6H PRN  polyvinyl alcohol (LIQUIFILM TEARS) 1.4 % ophthalmic solution 1 drop, Q4H PRN  morphine (PF) injection 2 mg, Q4H PRN  sodium chloride flush 0.9 % injection 5-40 mL, 2 times per day  sodium chloride flush 0.9 % injection 5-40 mL, PRN  0.9 % sodium chloride infusion, PRN  famotidine (PEPCID) tablet 20 mg, BID  aluminum & magnesium hydroxide-simethicone (MAALOX) 200-200-20 MG/5ML suspension 30 mL, Q6H PRN  ondansetron (ZOFRAN-ODT) disintegrating tablet 4 mg, Q8H PRN   Or  ondansetron (ZOFRAN) injection 4 mg, Q6H PRN  polyethylene glycol (GLYCOLAX) packet 17 g, Daily PRN  acetaminophen (TYLENOL) tablet 650 mg, Q6H PRN   Or  acetaminophen (TYLENOL) suppository 650 mg, Q6H PRN  guaiFENesin-dextromethorphan (ROBITUSSIN DM) 100-10 MG/5ML syrup 5 mL, Q4H PRN  Vitamin D (CHOLECALCIFEROL) tablet 2,000 Units, Daily  dexamethasone (DECADRON) 20 mg in sodium chloride 0.9 % IVPB, Daily  glucose (GLUTOSE) 40 % oral gel 15 g, PRN  dextrose 50 % IV solution, PRN  glucagon (rDNA) injection 1 mg, PRN  dextrose 5 % solution, PRN        Objective:  BP (!) 128/99   Pulse 94   Temp 96 °F (35.6 °C) (Temporal)   Resp 18   Ht 5' 7.5\" (1.715 m)   Wt 201 lb 1.6 oz (91.2 kg)   SpO2 (!) 89%   BMI 31.03 kg/m²     Intake/Output Summary (Last 24 hours) at 10/1/2021 0931  Last data filed at 10/1/2021 0558  Gross per 24 hour   Intake 1034.44 ml   Output 1625 ml   Net -590.56 ml      Wt Readings from Last 3 Encounters:   10/01/21 201 lb 1.6 oz (91.2 kg)   09/30/15 208 lb 12.8 oz (94.7 kg)   07/17/14 195 lb (88.5 kg)       General appearance:  Appears comfortable  Eyes: Sclera clear. Pupils equal.  ENT: Moist oral mucosa. Trachea midline, no adenopathy. Cardiovascular: Regular rhythm, normal S1, S2. No murmur. No edema in lower extremities  Respiratory: Not using accessory muscles. Good inspiratory effort. Clear to auscultation bilaterally, no wheeze or crackles. GI: Abdomen soft, no tenderness, not distended, normal bowel sounds  Musculoskeletal: No cyanosis in digits, neck supple  Neurology: CN 2-12 grossly intact. No speech or motor deficits  Psych: Normal affect.  Alert and oriented in time, place and person  Skin: Warm, dry, normal turgor    Labs and Tests:  CBC:   Recent Labs     09/29/21  0520 09/30/21  0434 10/01/21  0429   WBC 16.6* 20.3* 19.7*   HGB 15.7 16.7 15.9    307 326     BMP:    Recent Labs     09/29/21  0520 09/30/21  0434 10/01/21  0429   * 136 135*   K 4.1 4.7 5.1   CL 97* 97* 94*   CO2 27 26 30   BUN 40* 46* 43*   CREATININE 1.1 1.2 1.1   GLUCOSE 133* 130* 125*     Hepatic:   Recent Labs     09/29/21  0520   AST 29   ALT 65*   BILITOT 0.5   ALKPHOS 81       Discussed care with patient     Spent 30  minutes with patient and family at bedside and on unit reviewing medical records and labs, spent greater than 50% time counseling patient and family on diagnosis and plan   Problem List  Principal Problem:    Pneumonia due to COVID-19 virus  Active Problems:    HTN (hypertension)    GERD (gastroesophageal reflux disease)    Acute respiratory failure with hypoxia (HCC)    ASHWINI (acute kidney injury) ([facility] [address]    Hyponatremia    Anxiety    Obesity (BMI 30-39. 9)  Resolved Problems:    * No resolved hospital problems. *       Assessment & Plan:       Acute hypoxic respiratory failure secondary to Covid pneumonia; worsening transferred to ICU  Chest x-ray showed multifocal airspace disease on admission  Procalcitonin mildly elevated  Pulmonology on board appreciate their recs  Repeat chest x-ray showed multifocal airspace disease similar to previous/R  Continue supplemental oxygen keep sats above 90%  Status post Actemra; Continue Decadron and remdesivir  CRP trending down  Continue anticoagulation for hypercoagulable state    ASHWINI: Creatinine elevated 2.1 on admission; trending down  Nephrology on board appreciate their recs  Renal ultrasound and negative for hydronephrosis or calculi  Avoid nephrotoxin including home lisinopril  Output and daily weights    Hyponatremia: Improved    Anion gap acidosis: Resolved      Diet: ADULT DIET;  Regular  Adult Oral Nutrition Supplement; Frozen Oral Supplement  Code:Full Code  DVT PPX lovenox       Alana Valdez MD   10/1/2021 9:31 AM

## 2021-10-01 NOTE — PLAN OF CARE
Problem: Falls - Risk of:  Goal: Will remain free from falls  Description: Will remain free from falls  10/1/2021 1019 by Eli Cardona RN  Outcome: Ongoing  Note: Patient remains absent from falls at this time. Remains alert and oriented, up to chair with call light and belongings in reach. Non-slip on and fall precautions in place. Patient encouraged to use call light to request assistance, v/u.  Will continue to monitor. Problem: Pain:  Goal: Pain level will decrease  Description: Pain level will decrease  10/1/2021 1019 by Eli Cardona RN  Outcome: Ongoing  Note: Patient with c/o chronic back pain 9/10. Assisted up to chair with heat therapy applied and PRN Morphine administered per orders - see MAR. Will continue to monitor. Problem: Gas Exchange - Impaired  Goal: Absence of hypoxia  10/1/2021 1019 by Eli Cardona RN  Outcome: Ongoing  Note: Patient O2 sat 92-94% via 11 high flow nasal cannula. Will continue to monitor.

## 2021-10-02 VITALS
WEIGHT: 201 LBS | HEIGHT: 68 IN | HEART RATE: 115 BPM | SYSTOLIC BLOOD PRESSURE: 115 MMHG | RESPIRATION RATE: 25 BRPM | DIASTOLIC BLOOD PRESSURE: 97 MMHG | TEMPERATURE: 96.8 F | OXYGEN SATURATION: 97 % | BODY MASS INDEX: 30.46 KG/M2

## 2021-10-02 LAB
ANION GAP SERPL CALCULATED.3IONS-SCNC: 10 MMOL/L (ref 3–16)
ANISOCYTOSIS: ABNORMAL
APTT: 44.7 SEC (ref 26.2–38.6)
ATYPICAL LYMPHOCYTE RELATIVE PERCENT: 1 % (ref 0–6)
BANDED NEUTROPHILS RELATIVE PERCENT: 1 % (ref 0–7)
BASOPHILS ABSOLUTE: 0 K/UL (ref 0–0.2)
BASOPHILS RELATIVE PERCENT: 0 %
BUN BLDV-MCNC: 45 MG/DL (ref 7–20)
C-REACTIVE PROTEIN: 8.1 MG/L (ref 0–5.1)
CALCIUM SERPL-MCNC: 8.9 MG/DL (ref 8.3–10.6)
CHLORIDE BLD-SCNC: 94 MMOL/L (ref 99–110)
CO2: 31 MMOL/L (ref 21–32)
CREAT SERPL-MCNC: 1.1 MG/DL (ref 0.8–1.3)
EOSINOPHILS ABSOLUTE: 0 K/UL (ref 0–0.6)
EOSINOPHILS RELATIVE PERCENT: 0 %
GFR AFRICAN AMERICAN: >60
GFR NON-AFRICAN AMERICAN: >60
GLUCOSE BLD-MCNC: 115 MG/DL (ref 70–99)
GLUCOSE BLD-MCNC: 127 MG/DL (ref 70–99)
GLUCOSE BLD-MCNC: 229 MG/DL (ref 70–99)
GLUCOSE BLD-MCNC: 252 MG/DL (ref 70–99)
HCT VFR BLD CALC: 48.7 % (ref 40.5–52.5)
HEMOGLOBIN: 16.3 G/DL (ref 13.5–17.5)
LYMPHOCYTES ABSOLUTE: 2.6 K/UL (ref 1–5.1)
LYMPHOCYTES RELATIVE PERCENT: 11 %
MCH RBC QN AUTO: 26.7 PG (ref 26–34)
MCHC RBC AUTO-ENTMCNC: 33.5 G/DL (ref 31–36)
MCV RBC AUTO: 79.8 FL (ref 80–100)
METAMYELOCYTES RELATIVE PERCENT: 2 %
MICROCYTES: ABNORMAL
MONOCYTES ABSOLUTE: 0.9 K/UL (ref 0–1.3)
MONOCYTES RELATIVE PERCENT: 4 %
NEUTROPHILS ABSOLUTE: 18 K/UL (ref 1.7–7.7)
NEUTROPHILS RELATIVE PERCENT: 81 %
PDW BLD-RTO: 14.3 % (ref 12.4–15.4)
PERFORMED ON: ABNORMAL
PLATELET # BLD: 335 K/UL (ref 135–450)
PLATELET SLIDE REVIEW: ADEQUATE
PMV BLD AUTO: 8.2 FL (ref 5–10.5)
POTASSIUM REFLEX MAGNESIUM: 4.7 MMOL/L (ref 3.5–5.1)
PROCALCITONIN: 0.07 NG/ML (ref 0–0.15)
RBC # BLD: 6.1 M/UL (ref 4.2–5.9)
SLIDE REVIEW: ABNORMAL
SODIUM BLD-SCNC: 135 MMOL/L (ref 136–145)
WBC # BLD: 21.4 K/UL (ref 4–11)

## 2021-10-02 PROCEDURE — 2580000003 HC RX 258: Performed by: INTERNAL MEDICINE

## 2021-10-02 PROCEDURE — 85025 COMPLETE CBC W/AUTO DIFF WBC: CPT

## 2021-10-02 PROCEDURE — 83036 HEMOGLOBIN GLYCOSYLATED A1C: CPT

## 2021-10-02 PROCEDURE — 36415 COLL VENOUS BLD VENIPUNCTURE: CPT

## 2021-10-02 PROCEDURE — 85730 THROMBOPLASTIN TIME PARTIAL: CPT

## 2021-10-02 PROCEDURE — 86140 C-REACTIVE PROTEIN: CPT

## 2021-10-02 PROCEDURE — 2500000003 HC RX 250 WO HCPCS: Performed by: INTERNAL MEDICINE

## 2021-10-02 PROCEDURE — 6370000000 HC RX 637 (ALT 250 FOR IP): Performed by: HOSPITALIST

## 2021-10-02 PROCEDURE — 6360000002 HC RX W HCPCS: Performed by: INTERNAL MEDICINE

## 2021-10-02 PROCEDURE — 84145 PROCALCITONIN (PCT): CPT

## 2021-10-02 PROCEDURE — 80048 BASIC METABOLIC PNL TOTAL CA: CPT

## 2021-10-02 PROCEDURE — 99233 SBSQ HOSP IP/OBS HIGH 50: CPT | Performed by: INTERNAL MEDICINE

## 2021-10-02 PROCEDURE — 6360000002 HC RX W HCPCS: Performed by: HOSPITALIST

## 2021-10-02 PROCEDURE — 6370000000 HC RX 637 (ALT 250 FOR IP): Performed by: INTERNAL MEDICINE

## 2021-10-02 RX ORDER — BISACODYL 10 MG
10 SUPPOSITORY, RECTAL RECTAL DAILY PRN
Status: DISCONTINUED | OUTPATIENT
Start: 2021-10-02 | End: 2021-10-02 | Stop reason: HOSPADM

## 2021-10-02 RX ORDER — INSULIN LISPRO 100 [IU]/ML
0-6 INJECTION, SOLUTION INTRAVENOUS; SUBCUTANEOUS NIGHTLY
Qty: 1 ML | Refills: 0
Start: 2021-10-02 | End: 2021-10-02 | Stop reason: HOSPADM

## 2021-10-02 RX ORDER — FUROSEMIDE 10 MG/ML
20 INJECTION INTRAMUSCULAR; INTRAVENOUS 2 TIMES DAILY
Qty: 30 EACH | Refills: 0
Start: 2021-10-02

## 2021-10-02 RX ORDER — DEXAMETHASONE SODIUM PHOSPHATE 10 MG/ML
10 INJECTION, SOLUTION INTRAMUSCULAR; INTRAVENOUS DAILY
Status: DISCONTINUED | OUTPATIENT
Start: 2021-10-03 | End: 2021-10-02 | Stop reason: HOSPADM

## 2021-10-02 RX ORDER — DEXAMETHASONE SODIUM PHOSPHATE 10 MG/ML
10 INJECTION, SOLUTION INTRAMUSCULAR; INTRAVENOUS DAILY
Qty: 1 ML | Refills: 0
Start: 2021-10-03 | End: 2021-10-04

## 2021-10-02 RX ORDER — INSULIN LISPRO 100 [IU]/ML
0-12 INJECTION, SOLUTION INTRAVENOUS; SUBCUTANEOUS
Qty: 1 PEN | Refills: 0
Start: 2021-10-02 | End: 2021-10-02 | Stop reason: HOSPADM

## 2021-10-02 RX ORDER — GUAIFENESIN/DEXTROMETHORPHAN 100-10MG/5
5 SYRUP ORAL EVERY 4 HOURS PRN
Qty: 120 ML | Refills: 0
Start: 2021-10-02 | End: 2021-10-12

## 2021-10-02 RX ORDER — NICOTINE POLACRILEX 4 MG
15 LOZENGE BUCCAL PRN
Qty: 45 G | Refills: 1
Start: 2021-10-02 | End: 2021-10-02 | Stop reason: HOSPADM

## 2021-10-02 RX ADMIN — FUROSEMIDE 20 MG: 10 INJECTION, SOLUTION INTRAMUSCULAR; INTRAVENOUS at 08:38

## 2021-10-02 RX ADMIN — Medication 10 ML: at 08:38

## 2021-10-02 RX ADMIN — OXYCODONE AND ACETAMINOPHEN 1 TABLET: 10; 325 TABLET ORAL at 17:34

## 2021-10-02 RX ADMIN — INSULIN LISPRO 6 UNITS: 100 INJECTION, SOLUTION INTRAVENOUS; SUBCUTANEOUS at 17:29

## 2021-10-02 RX ADMIN — OXYCODONE AND ACETAMINOPHEN 1 TABLET: 10; 325 TABLET ORAL at 08:53

## 2021-10-02 RX ADMIN — BISACODYL 10 MG: 10 SUPPOSITORY RECTAL at 15:29

## 2021-10-02 RX ADMIN — ENOXAPARIN SODIUM 90 MG: 100 INJECTION SUBCUTANEOUS at 08:38

## 2021-10-02 RX ADMIN — MORPHINE SULFATE 2 MG: 2 INJECTION, SOLUTION INTRAMUSCULAR; INTRAVENOUS at 04:02

## 2021-10-02 RX ADMIN — ALUMINUM HYDROXIDE, MAGNESIUM HYDROXIDE, AND SIMETHICONE 30 ML: 200; 200; 20 SUSPENSION ORAL at 17:34

## 2021-10-02 RX ADMIN — DOCUSATE SODIUM 50 MG AND SENNOSIDES 8.6 MG 2 TABLET: 8.6; 5 TABLET, FILM COATED ORAL at 15:29

## 2021-10-02 RX ADMIN — OXYCODONE AND ACETAMINOPHEN 1 TABLET: 10; 325 TABLET ORAL at 02:28

## 2021-10-02 RX ADMIN — Medication 2000 UNITS: at 08:38

## 2021-10-02 RX ADMIN — INSULIN LISPRO 4 UNITS: 100 INJECTION, SOLUTION INTRAVENOUS; SUBCUTANEOUS at 13:18

## 2021-10-02 RX ADMIN — REMDESIVIR 100 MG: 100 INJECTION, POWDER, LYOPHILIZED, FOR SOLUTION INTRAVENOUS at 10:28

## 2021-10-02 RX ADMIN — DEXAMETHASONE SODIUM PHOSPHATE 20 MG: 4 INJECTION, SOLUTION INTRA-ARTICULAR; INTRALESIONAL; INTRAMUSCULAR; INTRAVENOUS; SOFT TISSUE at 08:54

## 2021-10-02 RX ADMIN — FAMOTIDINE 20 MG: 20 TABLET, FILM COATED ORAL at 08:38

## 2021-10-02 RX ADMIN — ONDANSETRON 4 MG: 4 TABLET, ORALLY DISINTEGRATING ORAL at 17:34

## 2021-10-02 ASSESSMENT — PAIN DESCRIPTION - DESCRIPTORS: DESCRIPTORS: ACHING;SHARP

## 2021-10-02 ASSESSMENT — PAIN SCALES - GENERAL
PAINLEVEL_OUTOF10: 7
PAINLEVEL_OUTOF10: 3
PAINLEVEL_OUTOF10: 0
PAINLEVEL_OUTOF10: 3
PAINLEVEL_OUTOF10: 8

## 2021-10-02 ASSESSMENT — PAIN DESCRIPTION - ONSET: ONSET: ON-GOING

## 2021-10-02 ASSESSMENT — PAIN DESCRIPTION - PAIN TYPE: TYPE: CHRONIC PAIN

## 2021-10-02 ASSESSMENT — PAIN DESCRIPTION - FREQUENCY: FREQUENCY: CONTINUOUS

## 2021-10-02 ASSESSMENT — PAIN DESCRIPTION - ORIENTATION: ORIENTATION: LEFT

## 2021-10-02 ASSESSMENT — PAIN DESCRIPTION - LOCATION: LOCATION: BACK

## 2021-10-02 NOTE — CARE COORDINATION
Please call report to [phone]. AVS and NNEKA can be faxed to [phone]    AVS/NNEKA faxed to [facility].

## 2021-10-02 NOTE — PLAN OF CARE
Problem: Pain:  Goal: Control of chronic pain  Description: Control of chronic pain  Outcome: Ongoing  Note: C/o chronic back pain and generalized aches, prn percocet given, pain reduced      Problem: Breathing Pattern - Ineffective  Goal: Ability to achieve and maintain a regular respiratory rate  Outcome: Ongoing  Note: Pt A&OX4, no c/o sob, slow to respond to questions at times this morning, lungs with rhonchi, on 8 L NC, BP (!) 115/97   Pulse 115   Temp 96.8 °F (36 °C)   Resp 25   Ht 5' 7.5\" (1.715 m)   Wt 201 lb (91.2 kg)   SpO2 92%   BMI 31.02 kg/m² , droplet plus precautions continued       Problem: Falls - Risk of:  Goal: Will remain free from falls  Description: Will remain free from falls  Note: D:  Patient continues to be a fall risk d/t weakness  A:  Bed alarm on, reminded to call before ambulation  R:  Patient calls before ambulation, slow gait, Will continue to assess fall risk status and report changes.

## 2021-10-02 NOTE — PROGRESS NOTES
PULMONARY AND CRITICAL CARE MEDICINE PROGRESS NOTE    Subjective: Patient has been weaned down to 8 L nasal cannula and saturating well. No respiratory distress. REVIEW OF SYSTEMS:   Constitutional symptoms: The patient denies fever, fatigue, night sweats, weight loss or weight gain. HEENT: No vision changes. No tinnitus, Denies sinus pain. No hoarseness, or dysphagia. Neck: Patient denies swelling in the neck. Cardiovascular: Denies chest pain, palpitation, syncope. Respiratory: Denies shortness of breath or cough. Gastrointestinal: Denies nausea, abdominal pain or change in bowel function. Genitourinary: Denies obstructive symptoms. No history of incontinence. Skin: No rashes or itching. Muskuloskeletal: Denies weakness or bone pain. Neurological: No headaches or seizures. Psychiatric: Denies mood swings or depression.      MEDICATIONS:     Scheduled Meds:   [START ON 10/3/2021] dexamethasone  10 mg IntraVENous Daily    insulin lispro  0-12 Units SubCUTAneous TID WC    insulin lispro  0-6 Units SubCUTAneous Nightly    remdesivir IVPB  100 mg IntraVENous Q24H    enoxaparin  1 mg/kg SubCUTAneous BID    furosemide  20 mg IntraVENous BID    sodium chloride flush  5-40 mL IntraVENous 2 times per day    famotidine  20 mg Oral BID    Vitamin D  2,000 Units Oral Daily       Current Infusions:    sodium chloride 25 mL (10/01/21 0940)    dextrose         PRN meds:  sennosides-docusate sodium, hydrALAZINE, oxyCODONE-acetaminophen, polyvinyl alcohol, morphine, sodium chloride flush, sodium chloride, aluminum & magnesium hydroxide-simethicone, ondansetron **OR** ondansetron, polyethylene glycol, acetaminophen **OR** acetaminophen, guaiFENesin-dextromethorphan, glucose, dextrose, glucagon (rDNA), dextrose    PHYSICAL EXAM:  BP (!) 115/97   Pulse 115   Temp 96.8 °F (36 °C)   Resp 25   Ht 5' 7.5\" (1.715 m)   Wt 201 lb (91.2 kg)   SpO2 98%   BMI 31.02 kg/m²  I/O last 3 completed failure  2. COVID-19 pneumonia  3. Hypercoagulable state  4. Acute kidney injury, resolved        PLAN:  Patient has been weaned down to 8 L NC. Saturating well.  No respiratory distress.    On Decadron 20 mg IV daily.  Day 8/10 of remdesivir.  Also received 1 dose of Actemra.  CRP is downtrending. Decrease Decadron to 10 mg IV daily. Patient was noted to have elevated D-dimer for which she was initiated on heparin drip in the setting of acute kidney injury. D-dimer is down to 486 now.    Now on therapeutic Lovenox as creatinine has normalized. Leukocytosis.  Negative procalcitonin. Encourage prone positioning in the bed as tolerated.  Out of bed in chair. Pulmonary and critical care will sign off. Javier Everett MD  Pulmonary Critical Care and Sleep Medicine  10/2/2021, 2:19 PM    This note was completed using dragon medical speech recognition software. Grammatical errors, random word insertions, pronoun errors and incomplete sentences are occasional consequences of this technology due to software limitations. If there are questions or concerns about the content of this note of information contained within the body of this dictation they should be addressed with the provider for clarification.

## 2021-10-02 NOTE — DISCHARGE SUMMARY
Hospital Medicine Discharge Summary    Patient ID: Gil Su      Patient's PCP: Len Katz MD    Admit Date: 9/24/2021     Discharge Date:      Admitting Physician: Dari Rasmussen MD     Discharge Physician: Alana Valdez MD        Active Hospital Problems    Diagnosis     Pneumonia due to COVID-19 virus [U07.1, J12.82]     Acute respiratory failure with hypoxia ([address] [J96.01]     ASHWINI (acute kidney injury) ([address] [N17.9]     Hyponatremia [E87.1]     Anxiety [F41.9]     Obesity (BMI 30-39. 9) [E66.9]     HTN (hypertension) [I10]     GERD (gastroesophageal reflux disease) [K21.9]        The patient was seen and examined on day of discharge and this discharge summary is in conjunction with any daily progress note from day of discharge. Hospital Course: This 79-year-old male with PMH of anxiety disorder, GERD, hypertension and obesity presented with generalized weakness, worsening shortness of breath, cough and body aches for the past 3 weeks. Of note, patient was not vaccinated for Covid 19. On admission, patient was also hypoxic; started on supplemental oxygen. Initial diagnostic lab work showed significantly elevated D-dimer, hyponatremia, anion gap metabolic acidosis and ASHWINI. CXR showed patchy airspace opacities bilaterally suggestive of pneumonia. Acute hypoxic respiratory failure secondary to Covid pneumonia: Pulmonology was consulted and patient was started on high-dose IV steroids, remdesivir, and given 1 dose of Actemra. Patient was initially started on heparin drip in the setting of ASHWINI and was later switched to therapeutic Lovenox once renal function was stable. Supplemental O2 was weaned as tolerated down to 8 L HFNC. Patient received 8 doses of IV remdesivir during hospital stay. IV Decadron was reduced to 10 mg daily on 10/2/2021.      Leukocytosis likely secondary to steroids:  Patient afebrile, procalcitonin negative, cultures negative to date    ASHWINI on CKD stage II: Resolved   Patient was followed by nephrology during hospital stay. Underwent renal ultrasound which showed mildly increased renal cortical echogenicity but the cortical thickness is maintained.  No shadowing calculi or hydronephrosis  Lisinopril was held; continue Lasix IV twice daily per nephrology recs    Anion gap metabolic acidosis: Secondary to ASHWINI:  Resolved    Hyponatremia: Improved      Physical Exam Performed:     BP (!) 115/97   Pulse 115   Temp 96.8 °F (36 °C)   Resp 25   Ht 5' 7.5\" (1.715 m)   Wt 201 lb (91.2 kg)   SpO2 97%   BMI 31.02 kg/m²       General appearance:  No apparent distress, appears stated age and cooperative. Respiratory:  Normal respiratory effort. Clear to auscultation, bilaterally without Rales/Wheezes/Rhonchi. Cardiovascular:  Regular rate and rhythm with normal S1/S2 without murmurs, rubs or gallops. Abdomen: Soft, non-tender, non-distended with normal bowel sounds. Musculoskeletal:  No clubbing, cyanosis or edema bilaterally. Full range of motion without deformity. Neurologic:  Neurovascularly intact without any focal sensory/motor deficits. Cranial nerves: II-XII intact, grossly non-focal.  Skin: Skin color, texture, turgor normal.  No rashes or lesions. Psychiatric:  Alert and oriented, thought content appropriate, normal insight      Labs: For convenience and continuity at follow-up the following most recent labs are provided:      CBC:    Lab Results   Component Value Date    WBC 21.4 10/02/2021    HGB 16.3 10/02/2021    HCT 48.7 10/02/2021     10/02/2021       Renal:    Lab Results   Component Value Date     10/02/2021    K 4.7 10/02/2021    CL 94 10/02/2021    CO2 31 10/02/2021    BUN 45 10/02/2021    CREATININE 1.1 10/02/2021    CALCIUM 8.9 10/02/2021         Significant Diagnostic Studies    Radiology:   US RENAL COMPLETE   Final Result   Mildly increased renal cortical echogenicity but the cortical thickness is   maintained.   No shadowing calculi or hydronephrosis. .         XR CHEST PORTABLE   Final Result   Stable cardiomegaly. Patchy airspace opacities bilaterally, may be related to pulmonary edema   versus pneumonia. XR CHEST PORTABLE   Final Result   Left-sided airspace disease. This is worrisome for developing pneumonia   versus interstitial alveolar edema. At minimum, consider follow-up imaging to document resolution following   medical treatment course. Consults:     IP CONSULT TO NEPHROLOGY  IP CONSULT TO PHARMACY  IP CONSULT TO PULMONOLOGY    Disposition: LTAC    Condition at Discharge: Stable     Discharge Instructions/Follow-up:   Follow up with your PCP upon discharge from LTAC  Follow up with nephrology as recommended  Follow up with pulmonology as recommended  Take all your medications as prescribed. Discharge Medications:     Current Discharge Medication List           Details   enoxaparin (LOVENOX) 100 MG/ML injection Inject 0.9 mLs into the skin 2 times daily  Qty: 1 each, Refills: 0      dexamethasone, PF, (DECADRON) 10 MG/ML injection Infuse 1 mL intravenously daily for 1 dose  Qty: 1 mL, Refills: 0      guaiFENesin-dextromethorphan (ROBITUSSIN DM) 100-10 MG/5ML syrup Take 5 mLs by mouth every 4 hours as needed for Cough  Qty: 120 mL, Refills: 0      furosemide (LASIX) 10 MG/ML injection Infuse 2 mLs intravenously 2 times daily  Qty: 30 each, Refills: 0              Details   oxyCODONE-acetaminophen (PERCOCET)  MG per tablet Take 1 tablet by mouth every 8 hours as needed for Pain.       omeprazole (PRILOSEC) 20 MG capsule Take 20 mg by mouth daily      fluticasone (FLONASE) 50 MCG/ACT nasal spray 2 sprays by Nasal route daily      DULoxetine (CYMBALTA) 30 MG capsule Take 1 capsule by mouth daily  Qty: 30 capsule, Refills: 3    Associated Diagnoses: Anxiety      ALPRAZolam (XANAX) 0.5 MG tablet Take 1 tablet by mouth 2 times daily as needed for Sleep  Qty: 60 tablet, Refills: 0    Comments: DUE FOR

## 2021-10-02 NOTE — PROGRESS NOTES
MD Ashlee Ford MD Booker Hug, MD               Office: [phone]                      Fax: [phone]             [facility]                   NEPHROLOGY INPATIENT PROGRESS NOTE:     PATIENT NAME: Lizette Moon  : 1960  MRN: [id number]        RECOMMENDATIONS:   Crea continues to be stable  COVID-19 management: ongoing   - Rx: Lasix IV BID - continue to keep him as \"dry\" as possible - worsening Azotemia more d/to steroids + mild pre-renal, but Cr stable  -ok for Actemra - no renal dosing   -ok for Ramdesivir : short course: 3-5 days. 100 mg   -IV Decadron   - might be causing azotemia   -Convalescent plasma not given  -A/C per protocol - to prevent renal microthrombi too   -Pneumonia antibiotic coverage, might need as elevated pro-calcitonin  -Hypoxia monitoring - severe needing HFNC - - some improvement. - hold home Lisinopril   - monitor PVR w/ bladder scan for jarvis insertion need. - no need for dialysis , but at higher risk for decompensation, needing closer monitoring. D/C plan from renal stand point:  - renally stable, but COVID hypoxia          IMPRESSION:       Admitted for:  ASHWINI (acute kidney injury) ([facility] [address] [N17.9]  Acute respiratory failure with hypoxemia ([facility] [address] [J96.01]  Community acquired pneumonia of left lung, unspecified part of lung [J18.9]  Pneumonia due to COVID-19 virus [U07.1, J12.82]      ASHWINI (on non -CKD: likely stage 2 ): Improved and at baseline  - BL Scr- 0.9 as off 2021 ---> 2.1 on admission  -: Etiology of ASHWINI - presumed clinically ATN   - other differentials: ?covid related nephropathy, unlikely GN / TI / TMA process   - UA : reviewed = fine cast = ATN   - Renal US - Mildly increased renal cortical echogenicity but the cortical thickness is   maintained.  No shadowing calculi or hydronephrosis. .     Associated problems:   Hyponatremia - mild. Follow.    Hypokalemia -resolved      Other major problems: Management per primary and other consulting teams. //   Acute Hypoxia respiratory failure- needing NIPPV   COVID (+) PNA      Hospital Problems         Last Modified POA    * (Principal) Pneumonia due to COVID-19 virus 9/25/2021 Yes    HTN (hypertension) 9/25/2021 Yes    GERD (gastroesophageal reflux disease) 9/25/2021 Yes    Acute respiratory failure with hypoxia ([facility] [address] 9/25/2021 Yes    ASHWINI (acute kidney injury) ([facility] [address] 9/25/2021 Yes    Hyponatremia 9/25/2021 Yes    Anxiety 9/25/2021 Yes    Obesity (BMI 30-39.9) 9/25/2021 Yes        :       Nicolas Strong MD,  [facility] [address] [place]: [phone] or [address]  Fax: [phone]        ========================================================   ========================================================      This patient is COVID-19 positive, so in a strict isolation, as per current protocol; So in order to preserve PPE supply and to avoid unnecessary exposure to prevent transmission of COVID-19; this patient was NOT examined face to face, as risk of contact outweighs the benefits and likely would not change much of my clinical management. HPI, review of system and physical exam was limited, as it was mainly obtained from chart review and the primary team. But all relevant records and diagnostic tests were reviewed, including laboratory and imaging results. Patient's care is being coordinated with the primary service. Subjective:  Seen in room  COVID (+)  BP controlled  Past medical, Surgical, Social, Family medical history reviewed by me. MEDICATIONS: reviewed by me. Medications Prior to Admission:  No current facility-administered medications on file prior to encounter. Current Outpatient Medications on File Prior to Encounter   Medication Sig Dispense Refill    oxyCODONE-acetaminophen (PERCOCET)  MG per tablet Take 1 tablet by mouth every 8 hours as needed for Pain.       lisinopril (PRINIVIL;ZESTRIL) 20 MG tablet TAKE 1 TABLET BY MOUTH DAILY 30 tablet 1    omeprazole (PRILOSEC) 20 MG capsule Take 20 mg by mouth daily      HYDROcodone-acetaminophen (NORCO) 5-325 MG per tablet Take 1 tablet by mouth 2 times daily      fluticasone (FLONASE) 50 MCG/ACT nasal spray 2 sprays by Nasal route daily      baclofen (LIORESAL) 10 MG tablet Take 10 mg by mouth 2 times daily as needed      DULoxetine (CYMBALTA) 30 MG capsule Take 1 capsule by mouth daily 30 capsule 3    ALPRAZolam (XANAX) 0.5 MG tablet Take 1 tablet by mouth 2 times daily as needed for Sleep 60 tablet 0    Melatonin 5 MG TABS tablet Take 1 tablet by mouth nightly as needed.            Current Facility-Administered Medications:     [START ON 10/3/2021] dexamethasone (PF) (DECADRON) injection 10 mg, 10 mg, IntraVENous, Daily, Sheila Junior MD    sennosides-docusate sodium (SENOKOT-S) 8.6-50 MG tablet 2 tablet, 2 tablet, Oral, Daily PRN, Daniel Pro MD, 2 tablet at 09/30/21 1427    insulin lispro (1 Unit Dial) 0-12 Units, 0-12 Units, SubCUTAneous, TID WC, Daniel Pro MD, 4 Units at 10/01/21 1837    insulin lispro (1 Unit Dial) 0-6 Units, 0-6 Units, SubCUTAneous, Nightly, Daniel Pro MD, 1 Units at 10/01/21 2058    remdesivir 100 mg in sodium chloride 0.9 % 250 mL IVPB, 100 mg, IntraVENous, Q24H, Sheila Junior MD, Last Rate: 500 mL/hr at 10/02/21 1028, 100 mg at 10/02/21 1028    enoxaparin (LOVENOX) injection 90 mg, 1 mg/kg, SubCUTAneous, BID, Sheila Junior MD, 90 mg at 10/02/21 0838    furosemide (LASIX) injection 20 mg, 20 mg, IntraVENous, BID, Abdias Hurtado MD, 20 mg at 10/02/21 0838    hydrALAZINE (APRESOLINE) injection 10 mg, 10 mg, IntraVENous, Q6H PRN, Daniel Pro MD, 10 mg at 09/28/21 2046    oxyCODONE-acetaminophen (PERCOCET)  MG per tablet 1 tablet, 1 tablet, Oral, Q6H PRN, Arabella Coyle MD, 1 tablet at 10/02/21 0853    polyvinyl alcohol (LIQUIFILM TEARS) 1.4 % ophthalmic solution 1 drop, 1 drop, Both Eyes, Q4H PRN, Gen Blas Rogers MD, 1 drop at 09/26/21 2021    morphine (PF) injection 2 mg, 2 mg, IntraVENous, Q4H PRN, Ayanna Bolaños MD, 2 mg at 10/02/21 0402    sodium chloride flush 0.9 % injection 5-40 mL, 5-40 mL, IntraVENous, 2 times per day, Elisabeth Naranjo MD, 10 mL at 10/02/21 0838    sodium chloride flush 0.9 % injection 5-40 mL, 5-40 mL, IntraVENous, PRN, Elisabeth Naranjo MD    0.9 % sodium chloride infusion, 25 mL, IntraVENous, PRN, Elisabeth Naranjo MD, Last Rate: 100 mL/hr at 10/01/21 0940, 25 mL at 10/01/21 0940    famotidine (PEPCID) tablet 20 mg, 20 mg, Oral, BID, Elisabeth Naranjo MD, 20 mg at 10/02/21 0838    aluminum & magnesium hydroxide-simethicone (MAALOX) 200-200-20 MG/5ML suspension 30 mL, 30 mL, Oral, Q6H PRN, Elisabeth Naranjo MD    ondansetron (ZOFRAN-ODT) disintegrating tablet 4 mg, 4 mg, Oral, Q8H PRN, 4 mg at 09/28/21 1029 **OR** ondansetron (ZOFRAN) injection 4 mg, 4 mg, IntraVENous, Q6H PRN, Elisabeth Naranjo MD, 4 mg at 09/27/21 1140    polyethylene glycol (GLYCOLAX) packet 17 g, 17 g, Oral, Daily PRN, Elisabeth Naranjo MD, 17 g at 09/30/21 0759    acetaminophen (TYLENOL) tablet 650 mg, 650 mg, Oral, Q6H PRN, 650 mg at 09/28/21 0913 **OR** acetaminophen (TYLENOL) suppository 650 mg, 650 mg, Rectal, Q6H PRN, Elisabeth Naranjo MD    guaiFENesin-dextromethorphan (ROBITUSSIN DM) 100-10 MG/5ML syrup 5 mL, 5 mL, Oral, Q4H PRN, Elisabeth Naranjo MD    Vitamin D (CHOLECALCIFEROL) tablet 2,000 Units, 2,000 Units, Oral, Daily, Elisabeth Naranjo MD, 2,000 Units at 10/02/21 0838    glucose (GLUTOSE) 40 % oral gel 15 g, 15 g, Oral, PRN, Ayanna Bolaños MD    dextrose 50 % IV solution, 12.5 g, IntraVENous, PRN, Ayanna Bolaños MD    glucagon (rDNA) injection 1 mg, 1 mg, IntraMUSCular, PRN, Regine Covington MD    dextrose 5 % solution, 100 mL/hr, IntraVENous, PRN, Regine Covington MD    REVIEW OF SYSTEMS  Review of systems not obtained due to patient factors      PHYSICAL EXAM:  Recent vital signs and recent I/Os reviewed by me. Wt Readings from Last 3 Encounters:   10/02/21 201 lb (91.2 kg)   09/30/15 208 lb 12.8 oz (94.7 kg)   07/17/14 195 lb (88.5 kg)     BP Readings from Last 3 Encounters:   10/02/21 (!) 115/97   09/30/15 (!) 130/100   07/17/14 122/90     Patient Vitals for the past 24 hrs:   BP Temp Temp src Pulse Resp SpO2 Weight   10/02/21 0833 (!) 115/97 96.8 °F (36 °C) -- 115 25 92 % --   10/02/21 0600 -- -- -- -- -- -- 201 lb (91.2 kg)   10/02/21 0405 -- -- -- -- 15 91 % --   10/02/21 0400 (!) 128/109 96.8 °F (36 °C) Temporal 87 16 94 % --   10/02/21 0000 (!) 128/116 96.9 °F (36.1 °C) Temporal 91 13 94 % --   10/01/21 2000 (!) 138/101 97 °F (36.1 °C) Temporal -- -- -- --   10/01/21 1631 (!) 125/107 -- -- 98 11 95 % --   10/01/21 1629 (!) 125/107 95.5 °F (35.3 °C) Temporal 99 23 96 % --   10/01/21 1305 (!) 106/90 96.7 °F (35.9 °C) Temporal 108 20 96 % --       Intake/Output Summary (Last 24 hours) at 10/2/2021 1134  Last data filed at 10/2/2021 1031  Gross per 24 hour   Intake 240 ml   Output 1625 ml   Net -1385 ml          Physical exam:    In-person bedside physical examination deferred. Pursuant to the emergency declaration under the [facility], [address] [place] [address] authority and the [employer] and [employer] General Act, this clinical encounter was conducted to provide necessary medical care. (Also consistent with new provisions and guidance offered by [facility] on March 18, 2020 in setting of COVID 19 outbreak and in order to preserve personal protective equipment in accordance with the flexibilities announced by CMS on March 30, 2020)   References: https://[facility]. [facility]/Portals/0/Resources/COVID-19/3_18%20Telemed%20Guidance%20Updated%20March%2018. pdf?xai=[email or web address]                      https://[facility]. [facility]/Portals/0/Resources/COVID-19/3_18%20Telemed%20Guidance%20Updated%20March%2018. pdf?abn=[email or web address] pdf    However, I did visually inspect the patient and observed the following:      Vitals signs reviewed. Constitutional:       General: not in acute distress. Appearance:   ill-appearing. HENT:      Head: Normocephalic and atraumatic. Nose: Nose normal.      Mouth/Throat:      Mouth: Mucous membranes are moist.   Eyes:      General: No scleral icterus. Conjunctiva/sclera: Conjunctivae normal.   Neck:      Musculoskeletal: Normal range of motion and neck supple. Cardiovascular:      Rate and Rhythm: Normal rate. Pulmonary:      Effort: Pulmonary effort is ab-normal. On HFNC  Abdominal:      General: There is no distension. Musculoskeletal:      Right lower leg: edema. Left lower leg: edema. Skin:     Coloration: Skin is not jaundiced. Neurological:      General: deferred          DATA:  Diagnostic tests reviewed by me for today's visit:   (AS NEEDED FOR MY EVALUATION AND MANAGEMENT). Recent Labs     09/30/21  0434 10/01/21  0429 10/02/21  0417   WBC 20.3* 19.7* 21.4*   HCT 50.9 47.4 48.7    326 335     Iron Saturation:  No components found for: PERCENTFE  FERRITIN:    Lab Results   Component Value Date    FERRITIN 3,230.0 09/25/2021     IRON:  No results found for: IRON  TIBC:  No results found for: TIBC    Recent Labs     09/30/21  0434 10/01/21  0429 10/02/21  0417    135* 135*   K 4.7 5.1 4.7   CL 97* 94* 94*   CO2 26 30 31   BUN 46* 43* 45*   CREATININE 1.2 1.1 1.1     Recent Labs     09/30/21  0434 10/01/21  0429 10/02/21  0417   CALCIUM 9.0 9.2 8.9     No results for input(s): PH, PCO2, PO2 in the last 72 hours.     Invalid input(s): Ryan Em    ABG:  No results found for: PH, PCO2, PO2, HCO3, BE, THGB, TCO2, O2SAT  VBG:  No results found for: PHVEN, UWH4UJW, BEVEN, C6NWYNTZ    LDH:    Lab Results   Component Value Date     09/25/2021     Uric Acid:  No results found for: Sofy Shukla    PT/INR:    Lab Results   Component Value Date    PROTIME 15.5 09/25/2021    INR 1.35 09/25/2021     Warfarin PT/INR:  No components found for: Yee Olp  PTT:    Lab Results   Component Value Date    APTT 44.7 10/02/2021   [APTT}  Last 3 Troponin:    Lab Results   Component Value Date    TROPONINI <0.01 09/25/2021    TROPONINI <0.01 09/24/2021    TROPONINI 0.01 03/19/2010       U/A:    Lab Results   Component Value Date    COLORU YELLOW 09/24/2021    PROTEINU 100 09/24/2021    PHUR 5.5 09/24/2021    WBCUA 11 09/24/2021    RBCUA 5 09/24/2021    CLARITYU CLOUDY 09/24/2021    SPECGRAV 1.019 09/24/2021    LEUKOCYTESUR Negative 09/24/2021    UROBILINOGEN 0.2 09/24/2021    BILIRUBINUR Negative 09/24/2021    BLOODU MODERATE 09/24/2021    GLUCOSEU Negative 09/24/2021     Microalbumen/Creatinine ratio:  No components found for: RUCREAT  24 Hour Urine for Protein:  No components found for: RAWUPRO, UHRS3, FIME04XM, UTV3  24 Hour Urine for Creatinine Clearance:  No components found for: CREAT4, UHRS10, UTV10  Urine Toxicology:  No components found for: IAMMENTA, IBARBIT, IBENZO, ICOCAINE, IMARTHC, IOPIATES, IPHENCYC    HgBA1c:  No results found for: LABA1C  RPR:  No results found for: RPR  HIV:  No results found for: HIV  KYAW:  No results found for: ANATITER, KYAW  RF:  No results found for: RF  DSDNA:  No components found for: DNA  AMYLASE:  No results found for: AMYLASE  LIPASE:  No results found for: LIPASE  Fibrinogen Level:  No components found for: FIB       BELOW MENTIONED RADIOLOGY STUDY RESULTS BY ME (AS NEEDED FOR MY EVALUATION AND MANAGEMENT). XR CHEST PORTABLE    Result Date: 9/24/2021  EXAMINATION: ONE XRAY VIEW OF THE CHEST 9/24/2021 10:44 pm COMPARISON: 03/19/2010 HISTORY: ORDERING SYSTEM PROVIDED HISTORY: SOB TECHNOLOGIST PROVIDED HISTORY: Reason for exam:->SOB FINDINGS: Cardiomegaly. Mild right hilar prominence could be vascular.   Right lung is grossly clear.  There multifocal parenchymal opacity involving left lung worrisome for developing pneumonia versus interstitial alveolar infiltrate/edema. No effusion. No pneumothorax. Left-sided airspace disease. This is worrisome for developing pneumonia versus interstitial alveolar edema. At minimum, consider follow-up imaging to document resolution following medical treatment course. US RENAL COMPLETE [[id number]] Collected: 09/27/21 2223      Order Status: Completed Updated: 09/27/21 2228     Narrative:       EXAMINATION:   RETROPERITONEAL ULTRASOUND OF THE KIDNEYS AND URINARY BLADDER     9/27/2021     COMPARISON:   None     HISTORY:   ORDERING SYSTEM PROVIDED HISTORY: ASHWINI   TECHNOLOGIST PROVIDED HISTORY:     Reason for exam:->ASHWINI     FINDINGS:     Kidneys: The right kidney measures 10 cm in length and the left kidney measures 10.8   cm in length. Kidneys demonstrate mildly increased cortical echogenicity exaggerating the   corticomedullary differentiation.  Cortical thickness is maintained. [place]   evidence of hydronephrosis or intrarenal stones. Bladder:     Urinary bladder is collapsed with a catheter present. .      Impression:       Mildly increased renal cortical echogenicity but the cortical thickness is   maintained.  No shadowing calculi or hydronephrosis. Chrissy Campbell

## 2021-10-02 NOTE — DISCHARGE INSTR - COC
kg/m²     Last documented pain score (0-10 scale): Pain Level: 7  Last Weight:   Wt Readings from Last 1 Encounters:   10/02/21 201 lb (91.2 kg)     Mental Status:  oriented and alert    IV Access:  - Peripheral IV - site  left forearm, insertion date: 9/25/22    Nursing Mobility/ADLs:  Walking   Independent  Transfer  Assisted  Bathing  Assisted  Dressing  Assisted  Toileting  Assisted  Feeding  [facility] Delivery   whole    Wound Care Documentation and Therapy:        Elimination:  Continence:   · Bowel: Yes  · Bladder: Yes  Urinary Catheter: None   Colostomy/Ileostomy/Ileal Conduit: No       Date of Last BM: 9/25/21    Intake/Output Summary (Last 24 hours) at 10/2/2021 1225  Last data filed at 10/2/2021 1031  Gross per 24 hour   Intake 240 ml   Output 1625 ml   Net -1385 ml     I/O last 3 completed shifts:  In: -   Out: [address] [Urine:1475]    Safety Concerns: At Risk for Falls    Impairments/Disabilities:      None    Nutrition Therapy:  Current Nutrition Therapy:   - Oral Diet:  Carb Control 4 carbs/meal (1800kcals/day)    Routes of Feeding: Oral  Liquids: Thin Liquids  Daily Fluid Restriction: no  Last Modified Barium Swallow with Video (Video Swallowing Test): not done    Treatments at the Time of Hospital Discharge:   Respiratory Treatments: n/a  Oxygen Therapy:  is on oxygen at 7 L/min per nasal cannula.   Ventilator:    - No ventilator support    Rehab Therapies: Physical Therapy and Occupational Therapy  Weight Bearing Status/Restrictions: No weight bearing restirctions  Other Medical Equipment (for information only, NOT a DME order):  walker  Other Treatments: n/a    Patient's personal belongings (please select all that are sent with patient):  None    RN SIGNATURE:  Electronically signed by Kimo Martinez RN on 10/2/21 at 3:51 PM EDT    CASE MANAGEMENT/SOCIAL WORK SECTION    Inpatient Status Date: 09/25/2021    Readmission Risk Assessment Score:  Readmission Risk

## 2021-10-03 LAB
AVERAGE GLUCOSE: NORMAL
ESTIMATED AVERAGE GLUCOSE: 145.6 MG/DL
HBA1C MFR BLD: 6.7 %
HBA1C MFR BLD: 7 %

## 2022-12-15 NOTE — PROGRESS NOTES
MD Percy Guadalupe MD Melody Sports, MD               Office: [phone]                      Fax: [phone]             [facility]                   NEPHROLOGY INPATIENT PROGRESS NOTE:     PATIENT NAME: Venu Dupree  : 1960  MRN: [id number]        RECOMMENDATIONS:   - stoped bicarb infusion d/to hypoxia     COVID-19 management: ongoing   -ok for Actemra - no renal dosing   -ok for Ramdesivir : short course: 3-5 days. 100 mg   -IV Decadron  -Convalescent plasma not given  -A/C per protocol - to prevent renal microthrombi too   -Pneumonia antibiotic coverage, might need as elevated pro-calcitonin  -Hypoxia monitoring - severe needing HFNC - at risk for intubation     - hold home Lisinopril   - f/up Renal US - pending   - monitor PVR w/ bladder scan for jarvis insertion need. - no need for dialysis , but at higher risk for decompensation, needing closer monitoring. D/C plan from renal stand point:  - expect 3-4 days, w/ ASHWINI, COVID hypoxia    D/W primary team - Dr Carlos Dasilva:       Admitted for:  ASHWINI (acute kidney injury) ([facility] [address] [N17.9]  Acute respiratory failure with hypoxemia ([facility] [address] [J96.01]  Community acquired pneumonia of left lung, unspecified part of lung [J18.9]  Pneumonia due to COVID-19 virus [U07.1, J12.82]      ASHWINI (on non -CKD: likely stage 2 ): worsening   - BL Scr- 0.9 as off 2021 ---> 2.1 on admission  -: Etiology of ASHWINI - presumed clinically ATN   - other differentials: ?covid related nephropathy, unlikely GN / TI / TMA process   - UA : reviewed = fine cast = ATN     Associated problems:   Hyponatremia - moderate - d/to ASHWINI   Hypokalemia   Acidosis : high -AGMA , d/to ASHWINI, LA WNL    Other major problems: Management per primary and other consulting teams.    //   Acute Hypoxia respiratory failure- needing NIPPV   COVID (+) PNA      Hospital Problems         Last Modified POA    * (Principal) Pneumonia due to COVID-19 Detail Level: Detailed Quality 137: Melanoma: Continuity Of Care - Recall System: Patient information entered into a recall system that includes: target date for the next exam specified AND a process to follow up with patients regarding missed or unscheduled appointments is being coordinated with the primary service. Subjective:  Seen via doors  COVID (+)  Hypoxia worsened to HFNC, not improving  15L  Past medical, Surgical, Social, Family medical history reviewed by me. MEDICATIONS: reviewed by me. Medications Prior to Admission:  No current facility-administered medications on file prior to encounter. Current Outpatient Medications on File Prior to Encounter   Medication Sig Dispense Refill    oxyCODONE-acetaminophen (PERCOCET)  MG per tablet Take 1 tablet by mouth every 8 hours as needed for Pain.  lisinopril (PRINIVIL;ZESTRIL) 20 MG tablet TAKE 1 TABLET BY MOUTH DAILY 30 tablet 1    omeprazole (PRILOSEC) 20 MG capsule Take 20 mg by mouth daily      HYDROcodone-acetaminophen (NORCO) 5-325 MG per tablet Take 1 tablet by mouth 2 times daily      fluticasone (FLONASE) 50 MCG/ACT nasal spray 2 sprays by Nasal route daily      baclofen (LIORESAL) 10 MG tablet Take 10 mg by mouth 2 times daily as needed      DULoxetine (CYMBALTA) 30 MG capsule Take 1 capsule by mouth daily 30 capsule 3    ALPRAZolam (XANAX) 0.5 MG tablet Take 1 tablet by mouth 2 times daily as needed for Sleep 60 tablet 0    Melatonin 5 MG TABS tablet Take 1 tablet by mouth nightly as needed.            Current Facility-Administered Medications:     polyvinyl alcohol (LIQUIFILM TEARS) 1.4 % ophthalmic solution 1 drop, 1 drop, Both Eyes, Q4H PRN, Matthew Foley MD, 1 drop at 09/26/21 2021    morphine (PF) injection 2 mg, 2 mg, IntraVENous, Q4H PRN, Divya Smith MD, 2 mg at 09/26/21 2344    sodium chloride flush 0.9 % injection 5-40 mL, 5-40 mL, IntraVENous, 2 times per day, Rayleen Goldberg, MD, 10 mL at 09/26/21 2024    sodium chloride flush 0.9 % injection 5-40 mL, 5-40 mL, IntraVENous, PRN, Terry Spann MD    0.9 % sodium chloride infusion, 25 mL, IntraVENous, PRN, Rayleen Goldberg, MD, Last Rate: 100 mL/hr at 09/26/21 1223, 25 mL at 09/26/21 1223    famotidine (PEPCID) tablet 20 When Should The Patient Follow-Up For Their Next Full-Body Skin Exam?: 3 Months mg, 20 mg, Oral, BID, Roman Frank MD, 20 mg at 09/27/21 0742    aluminum & magnesium hydroxide-simethicone (MAALOX) 200-200-20 MG/5ML suspension 30 mL, 30 mL, Oral, Q6H PRN, Roman Frank MD    ondansetron (ZOFRAN-ODT) disintegrating tablet 4 mg, 4 mg, Oral, Q8H PRN **OR** ondansetron (ZOFRAN) injection 4 mg, 4 mg, IntraVENous, Q6H PRN, Roman Frank MD, 4 mg at 09/26/21 1330    polyethylene glycol (GLYCOLAX) packet 17 g, 17 g, Oral, Daily PRN, Roman Frank MD    acetaminophen (TYLENOL) tablet 650 mg, 650 mg, Oral, Q6H PRN, 650 mg at 09/26/21 0445 **OR** acetaminophen (TYLENOL) suppository 650 mg, 650 mg, Rectal, Q6H PRN, Roman Frank MD    guaiFENesin-dextromethorphan (ROBITUSSIN DM) 100-10 MG/5ML syrup 5 mL, 5 mL, Oral, Q4H PRN, Roman Frank MD    Vitamin D (CHOLECALCIFEROL) tablet 2,000 Units, 2,000 Units, Oral, Daily, Roman Frank MD, 2,000 Units at 09/26/21 0831    dexamethasone (DECADRON) 20 mg in sodium chloride 0.9 % IVPB, 20 mg, IntraVENous, Daily, Alanna Arvizu MD, Stopped at 09/26/21 0913    insulin lispro (1 Unit Dial) 0-6 Units, 0-6 Units, SubCUTAneous, TID WC, Regine Covington MD, 1 Units at 09/26/21 1805    insulin lispro (1 Unit Dial) 0-3 Units, 0-3 Units, SubCUTAneous, Nightly, Regine Covington MD, 1 Units at 09/26/21 2025    glucose (GLUTOSE) 40 % oral gel 15 g, 15 g, Oral, PRN, Sana Morales MD    dextrose 50 % IV solution, 12.5 g, IntraVENous, PRN, Sana Morales MD    glucagon (rDNA) injection 1 mg, 1 mg, IntraMUSCular, PRN, Regine Covington MD    dextrose 5 % solution, 100 mL/hr, IntraVENous, PRN, Regine Covington MD    oxyCODONE-acetaminophen (PERCOCET)  MG per tablet 1 tablet, 1 tablet, Oral, Q8H PRN, Sana Morales MD, 1 tablet at 09/27/21 0652    [COMPLETED] remdesivir 200 mg in sodium chloride 0.9 % 250 mL IVPB, 200 mg, IntraVENous, Once, Stopped at 09/25/21 4282 **FOLLOWED BY** remdesivir 100 mg in sodium chloride 0.9 % 250 mL IVPB, 100 mg, IntraVENous, Q24H, Jones Trejo MD, Stopped at 09/26/21 1326    heparin (porcine) injection 7,260 Units, 80 Units/kg, IntraVENous, PRN, Mark Box MD    heparin (porcine) injection 3,630 Units, 40 Units/kg, IntraVENous, PRN, Mark Box MD, 3,630 Units at 09/26/21 1855    heparin 25,000 units in dextrose 5% 250 mL (premix) infusion, 5-30 Units/kg/hr, IntraVENous, Continuous, Terry Spann MD, Last Rate: 9.1 mL/hr at 09/27/21 0738, 10 Units/kg/hr at 09/27/21 0738    REVIEW OF SYSTEMS  Review of systems not obtained due to patient factors      PHYSICAL EXAM:  Recent vital signs and recent I/Os reviewed by me. Wt Readings from Last 3 Encounters:   09/27/21 203 lb 9.6 oz (92.4 kg)   09/30/15 208 lb 12.8 oz (94.7 kg)   07/17/14 195 lb (88.5 kg)     BP Readings from Last 3 Encounters:   09/27/21 (!) 138/95   09/30/15 (!) 130/100   07/17/14 122/90     Patient Vitals for the past 24 hrs:   BP Temp Temp src Pulse Resp SpO2 Weight   09/27/21 0810 -- -- -- -- -- -- 203 lb 9.6 oz (92.4 kg)   09/27/21 0652 (!) 138/95 -- -- 77 -- 90 % --   09/27/21 0448 (!) 144/88 97.8 °F (36.6 °C) Oral 80 18 95 % --   09/26/21 2343 (!) 153/89 97.3 °F (36.3 °C) Axillary 61 20 96 % --   09/26/21 2013 129/88 97 °F (36.1 °C) Axillary 80 20 91 % --   09/26/21 1745 132/87 97.5 °F (36.4 °C) Axillary 86 18 92 % --   09/26/21 1741 -- -- -- -- 20 91 % --   09/26/21 1245 (!) 136/98 97.6 °F (36.4 °C) Axillary 65 18 92 % --   09/26/21 1224 -- -- -- -- 20 91 % --   09/26/21 0927 -- -- -- -- 20 91 % --       Intake/Output Summary (Last 24 hours) at 9/27/2021 1155  Last data filed at 9/27/2021 0448  Gross per 24 hour   Intake 600 ml   Output 2090 ml   Net -1490 ml          Physical exam:    In-person bedside physical examination deferred.   Pursuant to the emergency declaration under the [address] [place] [address] and the [employer] Act, this clinical encounter was conducted to provide necessary medical care. (Also consistent with new provisions and guidance offered by [facility] on March 18, 2020 in setting of COVID 19 outbreak and in order to preserve personal protective equipment in accordance with the flexibilities announced by CMS on March 30, 2020)   References: [email or web address] [facility]/Portals/[email or web address]/Resources/COVID-19/3_18%20Telemed%20Guidance%20Updated%20March%2018. pdf?dgs=[email or web address]                      [email or web address] [facility]/Portals/[email or web address]/Resources/COVID-19/3_18%20Telemed%20Guidance%20Updated%20March%2018. pdf?ffb=[phone]                      http://[employer]/. pdf    However, I did visually inspect the patient and observed the following:      Vitals signs reviewed. Constitutional:       General: not in acute distress. Appearance:   ill-appearing. HENT:      Head: Normocephalic and atraumatic. Nose: Nose normal.      Mouth/Throat:      Mouth: Mucous membranes are moist.   Eyes:      General: No scleral icterus. Conjunctiva/sclera: Conjunctivae normal.   Neck:      Musculoskeletal: Normal range of motion and neck supple. Cardiovascular:      Rate and Rhythm: Normal rate. Pulmonary:      Effort: Pulmonary effort is ab-normal. On HFNC  Abdominal:      General: There is no distension. Musculoskeletal:      Right lower leg: edema. Left lower leg: edema. Skin:     Coloration: Skin is not jaundiced. Neurological:      General: deferred          DATA:  Diagnostic tests reviewed by me for today's visit:   (AS NEEDED FOR MY EVALUATION AND MANAGEMENT).        Recent Labs     09/24/21  2234 09/25/21  0422 09/26/21  0426 09/27/21  0512   WBC 13.6* 13.4* 11.9* 17.2*   HCT 47.4 44.1 41.3 44.4    172 179 226     Iron Saturation:  No components found for: PERCENTFE  FERRITIN:    Lab Results   Component Value Date    FERRITIN 3,230.0 09/25/2021 IRON:  No results found for: IRON  TIBC:  No results found for: TIBC    Recent Labs     09/24/21 2234 09/25/21  0422 09/25/21  1017 09/26/21  0427 09/27/21  0512   * 126*  126* 128* 126* 132*   K 3.4* 3.9  --  3.5 3.7   CL 91* 92*  --  90* 96*   CO2 17* 17*  --  21 22   BUN 90* 91*  --  90* 73*   CREATININE 2.1* 2.2*  --  2.4* 1.8*     Recent Labs     09/24/21 2234 09/25/21 0422 09/26/21 0427 09/27/21  0512   CALCIUM 9.0 8.6 8.4 8.7   MG  --   --  3.00*  --      No results for input(s): PH, PCO2, PO2 in the last 72 hours.     Invalid input(s): Mardene Mech    ABG:  No results found for: PH, PCO2, PO2, HCO3, BE, THGB, TCO2, O2SAT  VBG:  No results found for: PHVEN, IGR4IGM, BEVEN, F1BMTNBB    LDH:    Lab Results   Component Value Date     09/25/2021     Uric Acid:  No results found for: LABURIC, URICACID    PT/INR:    Lab Results   Component Value Date    PROTIME 15.5 09/25/2021    INR 1.35 09/25/2021     Warfarin PT/INR:  No components found for: Lizzy Justyn  PTT:    Lab Results   Component Value Date    APTT 72.5 09/27/2021   [APTT}  Last 3 Troponin:    Lab Results   Component Value Date    TROPONINI <0.01 09/25/2021    TROPONINI <0.01 09/24/2021    TROPONINI 0.01 03/19/2010       U/A:    Lab Results   Component Value Date    COLORU YELLOW 09/24/2021    PROTEINU 100 09/24/2021    PHUR 5.5 09/24/2021    WBCUA 11 09/24/2021    RBCUA 5 09/24/2021    CLARITYU CLOUDY 09/24/2021    SPECGRAV 1.019 09/24/2021    LEUKOCYTESUR Negative 09/24/2021    UROBILINOGEN 0.2 09/24/2021    BILIRUBINUR Negative 09/24/2021    BLOODU MODERATE 09/24/2021    GLUCOSEU Negative 09/24/2021     Microalbumen/Creatinine ratio:  No components found for: RUCREAT  24 Hour Urine for Protein:  No components found for: RAWUPRO, UHRS3, RFCD67HE, UTV3  24 Hour Urine for Creatinine Clearance:  No components found for: CREAT4, UHRS10, UTV10  Urine Toxicology:  No components found for: Renee Craig, [facility], [facility]    HgBA1c:  No results found for: LABA1C  RPR:  No results found for: RPR  HIV:  No results found for: HIV  KYAW:  No results found for: ANATITER, KYAW  RF:  No results found for: RF  DSDNA:  No components found for: DNA  AMYLASE:  No results found for: AMYLASE  LIPASE:  No results found for: LIPASE  Fibrinogen Level:  No components found for: FIB       BELOW MENTIONED RADIOLOGY STUDY RESULTS BY ME (AS NEEDED FOR MY EVALUATION AND MANAGEMENT). XR CHEST PORTABLE    Result Date: 9/24/2021  EXAMINATION: ONE XRAY VIEW OF THE CHEST 9/24/2021 10:44 pm COMPARISON: 03/19/2010 HISTORY: ORDERING SYSTEM PROVIDED HISTORY: SOB TECHNOLOGIST PROVIDED HISTORY: Reason for exam:->SOB FINDINGS: Cardiomegaly. Mild right hilar prominence could be vascular. Right lung is grossly clear. There multifocal parenchymal opacity involving left lung worrisome for developing pneumonia versus interstitial alveolar infiltrate/edema. No effusion. No pneumothorax. Left-sided airspace disease. This is worrisome for developing pneumonia versus interstitial alveolar edema. At minimum, consider follow-up imaging to document resolution following medical treatment course.

## 2025-03-14 NOTE — PLAN OF CARE
Problem: Falls - Risk of:  Goal: Will remain free from falls  Description: Will remain free from falls  Outcome: Ongoing  Goal: Absence of physical injury  Description: Absence of physical injury  Outcome: Ongoing     Problem: Pain:  Goal: Pain level will decrease  Description: Pain level will decrease  Outcome: Ongoing  Goal: Control of acute pain  Description: Control of acute pain  Outcome: Ongoing  Goal: Control of chronic pain  Description: Control of chronic pain  Outcome: Ongoing     Problem: Airway Clearance - Ineffective  Goal: Achieve or maintain patent airway  Outcome: Ongoing     Problem: Gas Exchange - Impaired  Goal: Absence of hypoxia  Outcome: Ongoing  Goal: Promote optimal lung function  Outcome: Ongoing     Problem: Breathing Pattern - Ineffective  Goal: Ability to achieve and maintain a regular respiratory rate  Outcome: Ongoing     Problem:  Body Temperature -  Risk of, Imbalanced  Goal: Ability to maintain a body temperature within defined limits  Outcome: Ongoing  Goal: Will regain or maintain usual level of consciousness  Outcome: Ongoing  Goal: Complications related to the disease process, condition or treatment will be avoided or minimized  Outcome: Ongoing     Problem: Isolation Precautions - Risk of Spread of Infection  Goal: Prevent transmission of infection  Outcome: Ongoing     Problem: Nutrition Deficits  Goal: Optimize nutritional status  Outcome: Ongoing     Problem: Risk for Fluid Volume Deficit  Goal: Maintain normal heart rhythm  Outcome: Ongoing  Goal: Maintain absence of muscle cramping  Outcome: Ongoing  Goal: Maintain normal serum potassium, sodium, calcium, phosphorus, and pH  Outcome: Ongoing     Problem: Loneliness or Risk for Loneliness  Goal: Demonstrate positive use of time alone when socialization is not possible  Outcome: Ongoing     Problem: Fatigue  Goal: Verbalize increase energy and improved vitality  Outcome: Ongoing     Problem: Patient Education: Go to Patient Education Activity  Goal: Patient/Family Education  Outcome: Ongoing Declines